# Patient Record
Sex: MALE | Race: WHITE | NOT HISPANIC OR LATINO | Employment: FULL TIME | URBAN - METROPOLITAN AREA
[De-identification: names, ages, dates, MRNs, and addresses within clinical notes are randomized per-mention and may not be internally consistent; named-entity substitution may affect disease eponyms.]

---

## 2017-01-10 ENCOUNTER — OFFICE VISIT (OUTPATIENT)
Dept: URGENT CARE | Facility: URGENT CARE | Age: 62
End: 2017-01-10
Payer: COMMERCIAL

## 2017-01-10 VITALS
DIASTOLIC BLOOD PRESSURE: 100 MMHG | HEART RATE: 88 BPM | SYSTOLIC BLOOD PRESSURE: 152 MMHG | TEMPERATURE: 98.1 F | OXYGEN SATURATION: 98 % | WEIGHT: 304 LBS

## 2017-01-10 DIAGNOSIS — H00.011 HORDEOLUM EXTERNUM OF RIGHT UPPER EYELID: Primary | ICD-10-CM

## 2017-01-10 DIAGNOSIS — H69.91 DYSFUNCTION OF EUSTACHIAN TUBE, RIGHT: ICD-10-CM

## 2017-01-10 PROCEDURE — 99213 OFFICE O/P EST LOW 20 MIN: CPT | Performed by: PHYSICIAN ASSISTANT

## 2017-01-10 RX ORDER — CEPHALEXIN 500 MG/1
500 CAPSULE ORAL 3 TIMES DAILY
Qty: 30 CAPSULE | Refills: 0 | Status: SHIPPED | OUTPATIENT
Start: 2017-01-10 | End: 2017-10-02

## 2017-01-10 RX ORDER — TOBRAMYCIN 3 MG/ML
1 SOLUTION/ DROPS OPHTHALMIC EVERY 4 HOURS
Qty: 1 BOTTLE | Refills: 0 | Status: SHIPPED | OUTPATIENT
Start: 2017-01-10 | End: 2017-01-17

## 2017-01-10 RX ORDER — FLUTICASONE PROPIONATE 50 MCG
1-2 SPRAY, SUSPENSION (ML) NASAL DAILY
Qty: 16 G | Refills: 0 | Status: SHIPPED | OUTPATIENT
Start: 2017-01-10 | End: 2019-06-20

## 2017-01-10 NOTE — PROGRESS NOTES
SUBJECTIVE:  Chief Complaint:   Chief Complaint   Patient presents with     Eye Problem     rt upper eyelid swelling,states some feeling of FB sensation     History of Present Illness:  Ras Esparza is a 61 year old male who presents complaining of mild and moderate right eye eyelid redness, swelling and lump noted for 2 day(s).   Onset/timing: gradual.    Associated Signs and Symptoms: right upper eyelid swelling and tenderness  Treatment measures tried include: none  Contact wearer : No    PMH:  Obesity    ALLERGIES   No Known Allergies     Social History     Social History     Marital Status:      Spouse Name: N/A     Number of Children: N/A     Years of Education: N/A     Occupational History     Not on file.     Social History Main Topics     Smoking status: Current Every Day Smoker -- 0.50 packs/day     Types: Cigarettes     Smokeless tobacco: Never Used     Alcohol Use: Not on file     Drug Use: Not on file     Sexual Activity: Not on file     Other Topics Concern     Not on file     Social History Narrative     No narrative on file         ROS:  CONSTITUTIONAL:NEGATIVE for fever, chills, change in weight  INTEGUMENTARY/SKIN: POSITIVE for right upper eyelid tenderness erythema, swelling  EYES: NEGATIVE for vision changes or irritation  ENT/MOUTH: Positive for right ear pressure  RESP:NEGATIVE for significant cough or SOB  CV: NEGATIVE for chest pain, palpitations or peripheral edema  NEURO: NEGATIVE for weakness, dizziness or paresthesias    OBJECTIVE:  /100 mmHg  Pulse 88  Temp(Src) 98.1  F (36.7  C) (Oral)  Wt 304 lb (137.893 kg)  SpO2 98%  General: no acute distress  Skin: Positive for right upper eyelid tenderness, erythema, localized swelling  Eye exam: right eye normal lid, conjunctiva, cornea, pupil and fundus, left eye normal lid, conjunctiva, cornea, pupil and fundus.  Ears: normal canals, TMs bilaterally, normal TM mobility  Nose: Positive for nasal congestion  Neck: supple,  non-tender, free range of motion, no adenopathy  Heart: NORMAL - regular rate and rhythm without murmur.  Lungs: normal and clear to auscultation  Neuro: PERRLA, EOMI    ASSESSMENT/PLAN:      ICD-10-CM    1. Hordeolum externum of right upper eyelid H00.011 tobramycin (TOBREX) 0.3 % ophthalmic solution     cephALEXin (KEFLEX) 500 MG capsule   2. Dysfunction of eustachian tube, right H69.81 fluticasone (FLONASE) 50 MCG/ACT spray       Warm moist compresses  OTC motrin  Follow up as needed

## 2017-01-10 NOTE — NURSING NOTE
Chief Complaint   Patient presents with     Eye Problem     rt upper eyelid swelling,states some feeling of FB sensation       Initial /100 mmHg  Pulse 88  Temp(Src) 98.1  F (36.7  C) (Oral)  Wt 304 lb (137.893 kg)  SpO2 98% There is no height on file to calculate BMI.  BP completed using cuff size: large

## 2017-10-02 ENCOUNTER — RADIANT APPOINTMENT (OUTPATIENT)
Dept: GENERAL RADIOLOGY | Facility: CLINIC | Age: 62
End: 2017-10-02
Attending: PHYSICIAN ASSISTANT
Payer: OTHER MISCELLANEOUS

## 2017-10-02 ENCOUNTER — OFFICE VISIT (OUTPATIENT)
Dept: URGENT CARE | Facility: URGENT CARE | Age: 62
End: 2017-10-02
Payer: OTHER MISCELLANEOUS

## 2017-10-02 VITALS
SYSTOLIC BLOOD PRESSURE: 150 MMHG | DIASTOLIC BLOOD PRESSURE: 100 MMHG | HEART RATE: 82 BPM | TEMPERATURE: 98.2 F | OXYGEN SATURATION: 97 % | WEIGHT: 300.9 LBS

## 2017-10-02 DIAGNOSIS — S61.219A LACERATION OF FINGER OF RIGHT HAND WITHOUT FOREIGN BODY, NAIL DAMAGE STATUS UNSPECIFIED, UNSPECIFIED FINGER, INITIAL ENCOUNTER: Primary | ICD-10-CM

## 2017-10-02 DIAGNOSIS — S99.911A ANKLE INJURY, RIGHT, INITIAL ENCOUNTER: ICD-10-CM

## 2017-10-02 DIAGNOSIS — S82.61XA CLOSED DISPLACED FRACTURE OF LATERAL MALLEOLUS OF RIGHT FIBULA, INITIAL ENCOUNTER: ICD-10-CM

## 2017-10-02 PROCEDURE — 73610 X-RAY EXAM OF ANKLE: CPT | Mod: RT

## 2017-10-02 PROCEDURE — 99214 OFFICE O/P EST MOD 30 MIN: CPT | Mod: 25 | Performed by: PHYSICIAN ASSISTANT

## 2017-10-02 PROCEDURE — 12002 RPR S/N/AX/GEN/TRNK2.6-7.5CM: CPT | Performed by: PHYSICIAN ASSISTANT

## 2017-10-02 PROCEDURE — 90471 IMMUNIZATION ADMIN: CPT | Performed by: PHYSICIAN ASSISTANT

## 2017-10-02 PROCEDURE — 90714 TD VACC NO PRESV 7 YRS+ IM: CPT | Performed by: PHYSICIAN ASSISTANT

## 2017-10-02 RX ORDER — IBUPROFEN 200 MG
200 TABLET ORAL EVERY 4 HOURS PRN
COMMUNITY
End: 2019-06-20

## 2017-10-02 RX ORDER — HYDROCODONE BITARTRATE AND ACETAMINOPHEN 5; 325 MG/1; MG/1
1 TABLET ORAL EVERY 6 HOURS PRN
Qty: 15 TABLET | Refills: 0 | Status: SHIPPED | OUTPATIENT
Start: 2017-10-02 | End: 2019-06-20

## 2017-10-02 NOTE — NURSING NOTE
Screening Questionnaire for Adult Immunization    Are you sick today?   No   Do you have allergies to medications, food, a vaccine component or latex?   No   Have you ever had a serious reaction after receiving a vaccination?   No   Do you have a long-term health problem with heart disease, lung disease, asthma, kidney disease, metabolic disease (e.g. diabetes), anemia, or other blood disorder?   No   Do you have cancer, leukemia, HIV/AIDS, or any other immune system problem?   No   In the past 3 months, have you taken medications that affect  your immune system, such as prednisone, other steroids, or anticancer drugs; drugs for the treatment of rheumatoid arthritis, Crohn s disease, or psoriasis; or have you had radiation treatments?   No   Have you had a seizure, or a brain or other nervous system problem?   No   During the past year, have you received a transfusion of blood or blood     products, or been given immune (gamma) globulin or antiviral drug?   No   For women: Are you pregnant or is there a chance you could become        pregnant during the next month?   No   Have you received any vaccinations in the past 4 weeks?   No     Immunization questionnaire answers were all negative.        Per orders of Antione Egan, injection of Td given by Mary Dalton. Patient instructed to remain in clinic for 15 minutes afterwards, and to report any adverse reaction to me immediately.       Screening performed by Mary Dalton on 10/2/2017 at 12:07 PM.

## 2017-10-02 NOTE — NURSING NOTE
Chief Complaint   Patient presents with     Fall     Pt reports that he tripped over some carpet this morning while at work. At the time, pt had a coffee mug in his right hand. The mug shattered when he hit the ground causing a few lacerations in his fingeres on the right hand. Pt also has a painful and swollen right ankle from the fall.     Urgent Care       Initial BP (!) 150/100  Pulse 82  Temp 98.2  F (36.8  C)  Wt (!) 300 lb 14.4 oz (136.5 kg)  SpO2 97% There is no height or weight on file to calculate BMI.  Medication Reconciliation: complete     Pt does not remember the last time he had a tetanus shot.

## 2017-10-02 NOTE — MR AVS SNAPSHOT
"              After Visit Summary   10/2/2017    Ras Esparza    MRN: [id number]           Patient Information     Date Of Birth          1955        Visit Information        Provider Department      10/2/2017 11:05 AM Antione Egan PA-C [facility]        Today's Diagnoses     Laceration of finger of right hand without foreign body, nail damage status unspecified, unspecified finger, initial encounter    -  1    Ankle injury, right, initial encounter        Closed displaced fracture of lateral malleolus of right fibula, initial encounter           Follow-ups after your visit        Who to contact     If you have questions or need follow up information about today's clinic visit or your schedule please contact [facility] directly at [phone].  Normal or non-critical lab and imaging results will be communicated to you by Mingleboxhart, letter or phone within 4 business days after the clinic has received the results. If you do not hear from us within 7 days, please contact the clinic through MyChart or phone. If you have a critical or abnormal lab result, we will notify you by phone as soon as possible.  Submit refill requests through Veebox or call your pharmacy and they will forward the refill request to us. Please allow 3 business days for your refill to be completed.          Additional Information About Your Visit        Mingleboxhart Information     Veebox lets you send messages to your doctor, view your test results, renew your prescriptions, schedule appointments and more. To sign up, go to [email or web address] . Click on \"Log in\" on the left side of the screen, which will take you to the Welcome page. Then click on \"Sign up Now\" on the right side of the page.     You will be asked to enter the access code listed below, as well as some personal information. Please follow the directions to create your username and password.     Your access code " is: [email or web address]  Expires: 2017 12:25 PM     Your access code will  in 90 days. If you need help or a new code, please call your [place] clinic or [phone].        Care EveryWhere ID     This is your Care EveryWhere ID. This could be used by other organizations to access your [place] medical records  [id number]        Your Vitals Were     Pulse Temperature Pulse Oximetry             82 98.2  F (36.8  C) 97%          Blood Pressure from Last 3 Encounters:   10/02/17 (!) 150/100   01/10/17 (!) 152/100    Weight from Last 3 Encounters:   10/02/17 (!) 300 lb 14.4 oz (136.5 kg)   01/10/17 (!) 304 lb (137.9 kg)              We Performed the Following     REPAIR SUPERFICIAL, WOUND BODY 2.6-7.5 CM     TD PRESERV FREE >=7 YRS ADS IM          Today's Medication Changes          These changes are accurate as of: 10/2/17 12:25 PM.  If you have any questions, ask your nurse or doctor.               Start taking these medicines.        Dose/Directions    HYDROcodone-acetaminophen 5-325 MG per tablet   Commonly known as:  NORCO   Used for:  Closed displaced fracture of lateral malleolus of right fibula, initial encounter   Started by:  Antione Egan PA-C        Dose:  1 tablet   Take 1 tablet by mouth every 6 hours as needed   Quantity:  15 tablet   Refills:  0       * order for DME   Used for:  Closed displaced fracture of lateral malleolus of right fibula, initial encounter   Started by:  Antione Egan PA-C        Right tall cam walker   Quantity:  1 Device   Refills:  0       * order for DME   Used for:  Closed displaced fracture of lateral malleolus of right fibula, initial encounter   Started by:  Antione Egan PA-C        Crutches 1 pair   Quantity:  1 Device   Refills:  0       * Notice:  This list has 2 medication(s) that are the same as other medications prescribed for you. Read the directions carefully, and ask your doctor or other care provider to review them with you.         Where to get  your medicines      Some of these will need a paper prescription and others can be bought over the counter.  Ask your nurse if you have questions.     Bring a paper prescription for each of these medications     HYDROcodone-acetaminophen 5-325 MG per tablet    order for DME    order for DME                Primary Care Provider    None Specified       No primary provider on file.        Equal Access to Services     EMILY GALDAMEZ : Hadii aad ku hadrossvernon Sochaparritaali, waaxda luqadaha, qaybta kaalmada adeegyada, laura louietexsheng kaminski. So [facility] [phone].    ATENCIÓN: Si habla español, tiene a curry disposición servicios gratuitos de asistencia lingüística. Amaliaame al [phone].    We comply with applicable federal civil rights laws and [place] laws. We do not discriminate on the basis of race, color, national origin, age, disability, sex, sexual orientation, or gender identity.            Thank you!     Thank you for choosing [facility]  for your care. Our goal is always to provide you with excellent care. Hearing back from our patients is one way we can continue to improve our services. Please take a few minutes to complete the written survey that you may receive in the mail after your visit with us. Thank you!             Your Updated Medication List - Protect others around you: Learn how to safely use, store and throw away your medicines at www.disposemymeds.org.          This list is accurate as of: 10/2/17 12:25 PM.  Always use your most recent med list.                   Brand Name Dispense Instructions for use Diagnosis    fluticasone 50 MCG/ACT spray    FLONASE    16 g    Spray 1-2 sprays into both nostrils daily    Dysfunction of Eustachian tube, right       HYDROcodone-acetaminophen 5-325 MG per tablet    NORCO    15 tablet    Take 1 tablet by mouth every 6 hours as needed    Closed displaced fracture of lateral malleolus of right fibula, initial encounter        ibuprofen 200 MG tablet    ADVIL/MOTRIN     Take 200 mg by mouth every 4 hours as needed for mild pain        * order for DME     1 Device    Right tall cam walker    Closed displaced fracture of lateral malleolus of right fibula, initial encounter       * order for DME     1 Device    Crutches 1 pair    Closed displaced fracture of lateral malleolus of right fibula, initial encounter       * Notice:  This list has 2 medication(s) that are the same as other medications prescribed for you. Read the directions carefully, and ask your doctor or other care provider to review them with you.

## 2017-10-02 NOTE — PROGRESS NOTES
SUBJECTIVE:     Chief Complaint   Patient presents with     Fall     Pt reports that he tripped over some carpet this morning while at work. At the time, pt had a coffee mug in his right hand. The mug shattered when he hit the ground causing a few lacerations in his fingeres on the right hand. Pt also has a painful and swollen right ankle from the fall.     Urgent Care     Ras Esparza is a 62 year old male who presents to the clinic with a laceration on the right fingers sustained 1 hours(s) ago.  This is a non-work related injury.    Mechanism of injury: coffee cup shattered when he fell.  He injured his right ankle in the process    Associated symptoms: finger lacerations, right ankle tenderness, right lateral ankle  Last tetanus booster within 10 years: no    PMH  Overweight    ALLERGIES  No Known Allergies     Social History     Social History     Marital status:      Spouse name: N/A     Number of children: N/A     Years of education: N/A     Occupational History     Not on file.     Social History Main Topics     Smoking status: Current Every Day Smoker     Packs/day: 0.50     Types: Cigarettes     Smokeless tobacco: Never Used      Comment: 12 cigarettes per day     Alcohol use Not on file     Drug use: Not on file     Sexual activity: Not on file     Other Topics Concern     Not on file     Social History Narrative     ROS:  GEN: No acute distress  SKIN: Positive for lacerations of fingers right hand  VASC: Normal perfusion  MS: Positive for right lateral ankle tenderness, localized swelling  NEURO: Normal sensation      EXAM:   GEN: No acute stress  VITALS: BP (!) 150/100  Pulse 82  Temp 98.2  F (36.8  C)  Wt (!) 300 lb 14.4 oz (136.5 kg)  SpO2 97%  SKIN:Size of laceration: 4 centimeters  Characteristics of the laceration: bleeding- moderate  Tendon function intact: yes  Sensation to light touch intact: yes  Pulses intact: yes  Picture included in patient's chart: no  MS: Positive for right  lateral ankle tenderness, localized swelling  NEURO: Negative for numbness  VASC: warm fingers and perfusion    Ankle xray Positive for right lateral ankle fracture, distal fibula, read by Antione Egan PA-C [facility] at time of visit.      Assessment:     Laceration of finger of right hand without foreign body, nail damage status unspecified, unspecified finger, initial encounter  Ankle injury, right, initial encounter    PLAN:  PROCEDURE NOTE::  Wound was locally injected with 3 cc's of Lidocaine 1% plain  Wound cleaned with HIBICLENS  Wound cleaned with betadine/saline solution  Lacerations were closed using  5-0 sutures interrupted sutures  After care instructions:  Keep wound clean and dry for the next 24-48 hours  Sutures out in 10 days  Apply anti-bacterial ointment for 1 week  Discussed the probability of scarring  Active range of motion exercises encouraged      ICD-10-CM    1. Laceration of finger of right hand without foreign body, nail damage status unspecified, unspecified finger, initial encounter S61.219A REPAIR SUPERFICIAL, WOUND BODY 2.6-7.5 CM     TD PRESERV FREE >=7 YRS ADS IM   2. Ankle injury, right, initial encounter S99.911A XR Ankle Right G/E 3 Views   3. Closed displaced fracture of lateral malleolus of right fibula, initial encounter S82.61XA order for DME     order for DME     HYDROcodone-acetaminophen (NORCO) 5-325 MG per tablet       Wear cam walker and use crutches  Follow up with Orthopedics this week for fracture

## 2017-10-12 ENCOUNTER — RADIANT APPOINTMENT (OUTPATIENT)
Dept: GENERAL RADIOLOGY | Facility: CLINIC | Age: 62
End: 2017-10-12
Attending: ORTHOPAEDIC SURGERY
Payer: OTHER MISCELLANEOUS

## 2017-10-12 DIAGNOSIS — R52 PAIN: ICD-10-CM

## 2017-10-12 PROCEDURE — 73610 X-RAY EXAM OF ANKLE: CPT | Mod: TC

## 2017-10-24 ENCOUNTER — RADIANT APPOINTMENT (OUTPATIENT)
Dept: GENERAL RADIOLOGY | Facility: CLINIC | Age: 62
End: 2017-10-24
Attending: ORTHOPAEDIC SURGERY
Payer: COMMERCIAL

## 2017-10-24 DIAGNOSIS — R52 PAIN: ICD-10-CM

## 2017-10-24 PROCEDURE — 73610 X-RAY EXAM OF ANKLE: CPT | Mod: TC

## 2017-11-16 ENCOUNTER — RADIANT APPOINTMENT (OUTPATIENT)
Dept: GENERAL RADIOLOGY | Facility: CLINIC | Age: 62
End: 2017-11-16
Attending: ORTHOPAEDIC SURGERY
Payer: COMMERCIAL

## 2017-11-16 DIAGNOSIS — R52 PAIN: ICD-10-CM

## 2017-11-16 PROCEDURE — 73610 X-RAY EXAM OF ANKLE: CPT | Mod: TC

## 2019-06-20 ENCOUNTER — OFFICE VISIT (OUTPATIENT)
Dept: INTERNAL MEDICINE | Facility: CLINIC | Age: 64
End: 2019-06-20
Payer: COMMERCIAL

## 2019-06-20 VITALS
HEART RATE: 94 BPM | RESPIRATION RATE: 16 BRPM | DIASTOLIC BLOOD PRESSURE: 80 MMHG | OXYGEN SATURATION: 99 % | TEMPERATURE: 97.4 F | BODY MASS INDEX: 34.27 KG/M2 | HEIGHT: 72 IN | SYSTOLIC BLOOD PRESSURE: 130 MMHG | WEIGHT: 253 LBS

## 2019-06-20 DIAGNOSIS — Z12.5 SCREENING FOR PROSTATE CANCER: ICD-10-CM

## 2019-06-20 DIAGNOSIS — R19.05 PERIUMBILICAL MASS: ICD-10-CM

## 2019-06-20 DIAGNOSIS — Z11.59 NEED FOR HEPATITIS C SCREENING TEST: ICD-10-CM

## 2019-06-20 DIAGNOSIS — F32.0 MILD MAJOR DEPRESSION (H): ICD-10-CM

## 2019-06-20 DIAGNOSIS — R06.83 SNORING: ICD-10-CM

## 2019-06-20 DIAGNOSIS — Z00.00 ROUTINE GENERAL MEDICAL EXAMINATION AT A HEALTH CARE FACILITY: Primary | ICD-10-CM

## 2019-06-20 DIAGNOSIS — Z13.220 SCREENING FOR HYPERLIPIDEMIA: ICD-10-CM

## 2019-06-20 DIAGNOSIS — R60.9 EDEMA, UNSPECIFIED TYPE: ICD-10-CM

## 2019-06-20 DIAGNOSIS — Z12.11 SCREEN FOR COLON CANCER: ICD-10-CM

## 2019-06-20 LAB
ERYTHROCYTE [DISTWIDTH] IN BLOOD BY AUTOMATED COUNT: 15.2 % (ref 10–15)
HCT VFR BLD AUTO: 27.9 % (ref 40–53)
HGB BLD-MCNC: 8.7 G/DL (ref 13.3–17.7)
MCH RBC QN AUTO: 30 PG (ref 26.5–33)
MCHC RBC AUTO-ENTMCNC: 31.2 G/DL (ref 31.5–36.5)
MCV RBC AUTO: 96 FL (ref 78–100)
NT-PROBNP SERPL-MCNC: ABNORMAL PG/ML (ref 0–125)
PLATELET # BLD AUTO: 227 10E9/L (ref 150–450)
RBC # BLD AUTO: 2.9 10E12/L (ref 4.4–5.9)
WBC # BLD AUTO: 8 10E9/L (ref 4–11)

## 2019-06-20 PROCEDURE — G0103 PSA SCREENING: HCPCS | Performed by: INTERNAL MEDICINE

## 2019-06-20 PROCEDURE — 80053 COMPREHEN METABOLIC PANEL: CPT | Performed by: INTERNAL MEDICINE

## 2019-06-20 PROCEDURE — 84443 ASSAY THYROID STIM HORMONE: CPT | Performed by: INTERNAL MEDICINE

## 2019-06-20 PROCEDURE — 86803 HEPATITIS C AB TEST: CPT | Performed by: INTERNAL MEDICINE

## 2019-06-20 PROCEDURE — 99396 PREV VISIT EST AGE 40-64: CPT | Performed by: INTERNAL MEDICINE

## 2019-06-20 PROCEDURE — 36415 COLL VENOUS BLD VENIPUNCTURE: CPT | Performed by: INTERNAL MEDICINE

## 2019-06-20 PROCEDURE — 85027 COMPLETE CBC AUTOMATED: CPT | Performed by: INTERNAL MEDICINE

## 2019-06-20 PROCEDURE — 83880 ASSAY OF NATRIURETIC PEPTIDE: CPT | Performed by: INTERNAL MEDICINE

## 2019-06-20 ASSESSMENT — MIFFLIN-ST. JEOR: SCORE: 1977.66

## 2019-06-20 NOTE — PROGRESS NOTES
SUBJECTIVE:   CC: Ras Esparza is an 64 year old male who presents for preventive health visit.     Healthy Habits:  Answers for HPI/ROS submitted by the patient on 6/20/2019   Annual Exam:  Frequency of exercise:: 1 day/week  Getting at least 3 servings of Calcium per day:: Yes  Diet:: Regular (no restrictions), Breakfast skipped  Taking medications regularly:: Yes  Bi-annual eye exam:: Yes  Dental care twice a year:: NO  Sleep apnea or symptoms of sleep apnea:: Daytime drowsiness. Has some snoring  Additional concerns today:: Yes  Duration of exercise:: Less than 15 minutes      Today's PHQ-2 Score:   PHQ-2 ( 1999 Pf[employer]) 6/20/2019   Q1: Little interest or pleasure in doing things 1   Q2: Feeling down, depressed or hopeless 1   PHQ-2 Score 2   Q1: Little interest or pleasure in doing things Several days   Q2: Feeling down, depressed or hopeless Several days   PHQ-2 Score 2       Abuse: Current or Past(Physical, Sexual or Emotional)- No  Do you feel safe in your environment? Yes    Social History     Tobacco Use     Smoking status: Current Every Day Smoker     Packs/day: 0.50     Types: Cigarettes     Smokeless tobacco: Never Used     Tobacco comment: 12 cigarettes per day   Substance Use Topics     Alcohol use: Not on file     If you drink alcohol do you typically have >3 drinks per day or >7 drinks per week? No                      Last PSA: No results found for: PSA    Reviewed orders with patient. Reviewed health maintenance and updated orders accordingly - Yes  Labs reviewed in EPIC    Reviewed and updated as needed this visit by clinical staff  Tobacco  Allergies  Meds         Reviewed and updated as needed this visit by Provider            ROS:  CONSTITUTIONAL: NEGATIVE for fever, chills. Weight down 50 pounds in 2 years with diet/exercise  INTEGUMENTARY/SKIN: NEGATIVE for worrisome rashes, moles or lesions  EYES: NEGATIVE for vision changes or irritation.  Has glasses for driving. Eye exam 1 year  "ago  ENT: NEGATIVE for ear, mouth and throat problems. Occ nasal congestion  RESP: NEGATIVE for significant cough. Quit smoking 1 month ago. 1/2 ppd prior. Able to mow lawn for 10-15 min and then has to stop for a few minutes to rest  CV: NEGATIVE for chest pain, palpitations. Has BLE peripheral edema for a couple weeks  GI: NEGATIVE for nausea, abdominal pain, heartburn, or change in bowel habits   male: negative for dysuria, hematuria  erectile dysfunction, urethral discharge. Stream slower. Nocturia x2. Coffee in AM 2 cups  MUSCULOSKELETAL: NEGATIVE for significant arthralgias or myalgia  NEURO: NEGATIVE for weakness, dizziness or paresthesias.   PSYCHIATRIC:  POSITIVE for depression recently. PHQ=     OBJECTIVE:   /80   Pulse 94   Temp 97.4  F (36.3  C) (Oral)   Resp 16   Ht 1.816 m (5' 11.5\")   Wt 114.8 kg (253 lb)   SpO2 99%   BMI 34.79 kg/m    EXAM:  General appearance -  alert, no distress  Skin - No  Lesions. Discoloration of skin bilateral low back from excess heating pad use  Head - normocephalic, atraumatic  Eyes - RHIANNA, EOMI, fundi exam with nondilated pupils negative.  Ears - External ears normal. Canals clear. TM's normal.  Nose/Sinuses - Nares normal. Septum midline. Mucosa normal. No drainage or sinus tenderness.  Oropharynx - No erythema, no adenopathy, no exudates.  Neck - Supple without adenopathy or thyromegaly. No bruits.  Lungs - Clear to auscultation without wheezes/rhonchi.  Heart - Regular rate and rhythm without murmurs, clicks, or gallops.  Nodes - No supraclavicular, axillary, or inguinal adenopathy palpable.  Abdomen - Abdomen obese,  soft, non-tender. BS normal. No  hepatosplenomegaly palpable. No bruits. Mass palpable  from symphysis pubis to a little above the umbilicus  Extremities -No cyanosis, clubbing. 3 plus BLE edema.    Musculoskeletal - Spine ROM normal. Muscular strength intact.   Peripheral pulses - radial=4/4, femoral=4/4, posterior tibial=4/4, dorsalis " pedis=4/4,  Neuro - Gait normal. Reflexes normal and symmetric. Sensation grossly WNL.  Genital - Normal-appearing male external genitalia. No scrotal masses or inguinal hernia palpable.   Rectal - Guaic negative stool. Normal tone. Prostate 3 plus in size with firmness /nodule left side  to palpation. No rectal masses  palpable       ASSESSMENT/PLAN:   1. Routine general medical examination at a health care facility   Lab as ordered. See below for HCM discussion. Will discuss Shingrix further once  Other issues addressed. Counseled to stop using heating pad  - Comprehensive metabolic panel  - TSH with free T4 reflex  - CBC with platelets    2. Edema, unspecified type  Labs to assess cause. Denies orthopnea. If no clear cause, will get ECHO heart and start diuretic therapy  - Comprehensive metabolic panel  - TSH with free T4 reflex  - N terminal pro BNP outpatient    3. Periumbilical mass  ? Enlarged bladder  Vs solid mass. Will need imaging. See plan discussion below.     4. Mild major depression (H)   Mild. Await labs and will then address. No suicidal ideation    5. Snoring   ? AVA. If labs OK, will refer to Sleep clinic    6. Screen for colon cancer  FIT test for now. Fture colonoscopy when willing  - Fecal colorectal cancer screen FIT - Future (S+30); Future    7. Screening for hyperlipidemia  Future lab when fasting  - Lipid panel reflex to direct LDL Fasting; Future    8. Screening for prostate cancer   abnormal prostate exam. Lab as ordered. Probable future urology referral. Will await lab result  - Prostate spec antigen screen    9. Need for hepatitis C screening test  Pt meets criteria for hepatitis C screening based on birth year 1943-65. Discussed pro/cons of Hep C screening/treatment and recs of USPTF. Pt agreeable to screening  - Hepatitis C Screen Reflex to HCV RNA Quant and Genotype      COUNSELING:  Reviewed preventive health counseling, as reflected in patient instructions    Estimated body mass  "index is 34.79 kg/m  as calculated from the following:    Height as of this encounter: 1.816 m (5' 11.5\").    Weight as of this encounter: 114.8 kg (253 lb).    Weight management plan: Discussed healthy diet and exercise guidelines     reports that he has been smoking cigarettes.  He has been smoking about 0.50 packs per day. He has never used smokeless tobacco.      Counseling Resources:  ATP IV Guidelines  Pooled Cohorts Equation Calculator  FRAX Risk Assessment  ICSI Preventive Guidelines  Dietary Guidelines for Americans, 2010  USDA's MyPlate  ASA Prophylaxis  Lung CA Screening      PLAN:   Labs as ordered including FIT stool test.   Future colonoscopy screening for colon cancer once other issues addressed  [facility]. They will call to schedule. Need to assess kidney function first today to check kidney function and  confirm  safety to use contrast. Will call you tomorrow with lab results and then place CT order if kidney function OK vs abdominal ultrasound  Fasting lab test in future for cholesterol once above issues clarified  Management of leg swelling will be based on lab results  Prostate management will be based on lab results  Possible  referral  In future to sleep clinic for fatigue if other workup negative  Mental health management will depend on above results    Krzysztof Thakur MD  [facility]       "

## 2019-06-20 NOTE — PATIENT INSTRUCTIONS
Labs as ordered including FIT stool test.   Future colonoscopy screening for colon cancer once other issues addressed  Probable CT abdomen and pelvis at [facility]. They will call to schedule. Need to assess kidney function first today to check kidney function and  confirm  safety to use contrast. Will call you tomorrow with lab results and then place CT order if kidney function OK vs abdominal ultrasound  Fasting lab test in future for cholesterol once above issues clarified  Management of leg swelling will be based on lab results  Prostate management will be based on lab results  Possible  referral  In future to sleep clinic for fatigue if other workup negative  Mental health management will depend on above results

## 2019-06-21 ENCOUNTER — APPOINTMENT (OUTPATIENT)
Dept: GENERAL RADIOLOGY | Facility: CLINIC | Age: 64
DRG: 699 | End: 2019-06-21
Attending: INTERNAL MEDICINE
Payer: COMMERCIAL

## 2019-06-21 ENCOUNTER — TELEPHONE (OUTPATIENT)
Dept: INTERNAL MEDICINE | Facility: CLINIC | Age: 64
End: 2019-06-21

## 2019-06-21 ENCOUNTER — APPOINTMENT (OUTPATIENT)
Dept: CT IMAGING | Facility: CLINIC | Age: 64
DRG: 699 | End: 2019-06-21
Attending: EMERGENCY MEDICINE
Payer: COMMERCIAL

## 2019-06-21 ENCOUNTER — APPOINTMENT (OUTPATIENT)
Dept: GENERAL RADIOLOGY | Facility: CLINIC | Age: 64
DRG: 699 | End: 2019-06-21
Attending: EMERGENCY MEDICINE
Payer: COMMERCIAL

## 2019-06-21 ENCOUNTER — HOSPITAL ENCOUNTER (INPATIENT)
Facility: CLINIC | Age: 64
LOS: 4 days | Discharge: HOME | DRG: 699 | End: 2019-06-25
Attending: EMERGENCY MEDICINE | Admitting: INTERNAL MEDICINE
Payer: COMMERCIAL

## 2019-06-21 ENCOUNTER — APPOINTMENT (OUTPATIENT)
Dept: ULTRASOUND IMAGING | Facility: CLINIC | Age: 64
DRG: 699 | End: 2019-06-21
Attending: INTERNAL MEDICINE
Payer: COMMERCIAL

## 2019-06-21 DIAGNOSIS — N13.9 LOWER URINARY TRACT OBSTRUCTION: ICD-10-CM

## 2019-06-21 DIAGNOSIS — R33.9 URINARY RETENTION: ICD-10-CM

## 2019-06-21 DIAGNOSIS — N13.4: ICD-10-CM

## 2019-06-21 DIAGNOSIS — N19 RENAL FAILURE, UNSPECIFIED CHRONICITY: ICD-10-CM

## 2019-06-21 DIAGNOSIS — I42.9 CARDIOMYOPATHY, UNSPECIFIED TYPE (H): Primary | ICD-10-CM

## 2019-06-21 DIAGNOSIS — N17.9 ACUTE KIDNEY INJURY (H): ICD-10-CM

## 2019-06-21 DIAGNOSIS — R03.0 ELEVATED BLOOD PRESSURE READING WITHOUT DIAGNOSIS OF HYPERTENSION: ICD-10-CM

## 2019-06-21 DIAGNOSIS — D64.9 NORMOCYTIC ANEMIA: ICD-10-CM

## 2019-06-21 DIAGNOSIS — N13.39 OTHER HYDRONEPHROSIS: ICD-10-CM

## 2019-06-21 LAB
ALBUMIN SERPL-MCNC: 3.3 G/DL (ref 3.4–5)
ALBUMIN SERPL-MCNC: 3.5 G/DL (ref 3.4–5)
ALBUMIN UR-MCNC: 10 MG/DL
ALP SERPL-CCNC: 79 U/L (ref 40–150)
ALP SERPL-CCNC: 80 U/L (ref 40–150)
ALT SERPL W P-5'-P-CCNC: 38 U/L (ref 0–70)
ALT SERPL W P-5'-P-CCNC: 39 U/L (ref 0–70)
ANION GAP SERPL CALCULATED.3IONS-SCNC: 11 MMOL/L (ref 3–14)
ANION GAP SERPL CALCULATED.3IONS-SCNC: 13 MMOL/L (ref 3–14)
ANION GAP SERPL CALCULATED.3IONS-SCNC: 14 MMOL/L (ref 3–14)
APPEARANCE UR: CLEAR
AST SERPL W P-5'-P-CCNC: 15 U/L (ref 0–45)
AST SERPL W P-5'-P-CCNC: 18 U/L (ref 0–45)
BASOPHILS # BLD AUTO: 0 10E9/L (ref 0–0.2)
BASOPHILS NFR BLD AUTO: 0.4 %
BILIRUB SERPL-MCNC: 0.6 MG/DL (ref 0.2–1.3)
BILIRUB SERPL-MCNC: 0.7 MG/DL (ref 0.2–1.3)
BILIRUB UR QL STRIP: NEGATIVE
BUN SERPL-MCNC: 94 MG/DL (ref 7–30)
BUN SERPL-MCNC: 96 MG/DL (ref 7–30)
BUN SERPL-MCNC: 98 MG/DL (ref 7–30)
CALCIUM SERPL-MCNC: 8.3 MG/DL (ref 8.5–10.1)
CALCIUM SERPL-MCNC: 8.4 MG/DL (ref 8.5–10.1)
CALCIUM SERPL-MCNC: 8.7 MG/DL (ref 8.5–10.1)
CHLORIDE SERPL-SCNC: 110 MMOL/L (ref 94–109)
CHLORIDE SERPL-SCNC: 110 MMOL/L (ref 94–109)
CHLORIDE SERPL-SCNC: 112 MMOL/L (ref 94–109)
CO2 SERPL-SCNC: 18 MMOL/L (ref 20–32)
COLOR UR AUTO: ABNORMAL
CREAT SERPL-MCNC: 8.15 MG/DL (ref 0.66–1.25)
CREAT SERPL-MCNC: 8.94 MG/DL (ref 0.66–1.25)
CREAT SERPL-MCNC: 9.59 MG/DL (ref 0.66–1.25)
DIFFERENTIAL METHOD BLD: ABNORMAL
EOSINOPHIL # BLD AUTO: 0.2 10E9/L (ref 0–0.7)
EOSINOPHIL NFR BLD AUTO: 2.9 %
ERYTHROCYTE [DISTWIDTH] IN BLOOD BY AUTOMATED COUNT: 15.4 % (ref 10–15)
GFR SERPL CREATININE-BSD FRML MDRD: 5 ML/MIN/{1.73_M2}
GFR SERPL CREATININE-BSD FRML MDRD: 6 ML/MIN/{1.73_M2}
GFR SERPL CREATININE-BSD FRML MDRD: 6 ML/MIN/{1.73_M2}
GLUCOSE SERPL-MCNC: 100 MG/DL (ref 70–99)
GLUCOSE SERPL-MCNC: 128 MG/DL (ref 70–99)
GLUCOSE SERPL-MCNC: 95 MG/DL (ref 70–99)
GLUCOSE UR STRIP-MCNC: NEGATIVE MG/DL
HCT VFR BLD AUTO: 27.7 % (ref 40–53)
HCV AB SERPL QL IA: NONREACTIVE
HGB BLD-MCNC: 8.9 G/DL (ref 13.3–17.7)
HGB UR QL STRIP: NEGATIVE
IMM GRANULOCYTES # BLD: 0 10E9/L (ref 0–0.4)
IMM GRANULOCYTES NFR BLD: 0.1 %
INTERPRETATION ECG - MUSE: NORMAL
KETONES UR STRIP-MCNC: NEGATIVE MG/DL
LEUKOCYTE ESTERASE UR QL STRIP: ABNORMAL
LYMPHOCYTES # BLD AUTO: 1.1 10E9/L (ref 0.8–5.3)
LYMPHOCYTES NFR BLD AUTO: 16.4 %
MCH RBC QN AUTO: 29.9 PG (ref 26.5–33)
MCHC RBC AUTO-ENTMCNC: 32.1 G/DL (ref 31.5–36.5)
MCV RBC AUTO: 93 FL (ref 78–100)
MONOCYTES # BLD AUTO: 0.4 10E9/L (ref 0–1.3)
MONOCYTES NFR BLD AUTO: 5.8 %
MUCOUS THREADS #/AREA URNS LPF: PRESENT /LPF
NEUTROPHILS # BLD AUTO: 5.1 10E9/L (ref 1.6–8.3)
NEUTROPHILS NFR BLD AUTO: 74.4 %
NITRATE UR QL: NEGATIVE
NRBC # BLD AUTO: 0 10*3/UL
NRBC BLD AUTO-RTO: 0 /100
PH UR STRIP: 6.5 PH (ref 5–7)
PLATELET # BLD AUTO: 228 10E9/L (ref 150–450)
POTASSIUM SERPL-SCNC: 4.5 MMOL/L (ref 3.4–5.3)
POTASSIUM SERPL-SCNC: 4.7 MMOL/L (ref 3.4–5.3)
POTASSIUM SERPL-SCNC: 5 MMOL/L (ref 3.4–5.3)
PROT SERPL-MCNC: 7.1 G/DL (ref 6.8–8.8)
PROT SERPL-MCNC: 7.1 G/DL (ref 6.8–8.8)
PSA SERPL-ACNC: 15.1 UG/L (ref 0–4)
RBC # BLD AUTO: 2.98 10E12/L (ref 4.4–5.9)
RBC #/AREA URNS AUTO: 2 /HPF (ref 0–2)
SODIUM SERPL-SCNC: 141 MMOL/L (ref 133–144)
SODIUM SERPL-SCNC: 141 MMOL/L (ref 133–144)
SODIUM SERPL-SCNC: 142 MMOL/L (ref 133–144)
SOURCE: ABNORMAL
SP GR UR STRIP: 1.01 (ref 1–1.03)
TSH SERPL DL<=0.005 MIU/L-ACNC: 0.85 MU/L (ref 0.4–4)
UROBILINOGEN UR STRIP-MCNC: NORMAL MG/DL (ref 0–2)
WBC # BLD AUTO: 6.9 10E9/L (ref 4–11)
WBC #/AREA URNS AUTO: 12 /HPF (ref 0–5)

## 2019-06-21 PROCEDURE — 87086 URINE CULTURE/COLONY COUNT: CPT | Performed by: EMERGENCY MEDICINE

## 2019-06-21 PROCEDURE — 36415 COLL VENOUS BLD VENIPUNCTURE: CPT | Performed by: INTERNAL MEDICINE

## 2019-06-21 PROCEDURE — 81001 URINALYSIS AUTO W/SCOPE: CPT | Performed by: EMERGENCY MEDICINE

## 2019-06-21 PROCEDURE — 12000000 ZZH R&B MED SURG/OB

## 2019-06-21 PROCEDURE — 25800030 ZZH RX IP 258 OP 636: Performed by: INTERNAL MEDICINE

## 2019-06-21 PROCEDURE — 93970 EXTREMITY STUDY: CPT

## 2019-06-21 PROCEDURE — 72080 X-RAY EXAM THORACOLMB 2/> VW: CPT

## 2019-06-21 PROCEDURE — 25800029 ZZH RX IP 258 OP 250: Performed by: INTERNAL MEDICINE

## 2019-06-21 PROCEDURE — 25000125 ZZHC RX 250: Performed by: EMERGENCY MEDICINE

## 2019-06-21 PROCEDURE — 51702 INSERT TEMP BLADDER CATH: CPT

## 2019-06-21 PROCEDURE — 86706 HEP B SURFACE ANTIBODY: CPT | Performed by: INTERNAL MEDICINE

## 2019-06-21 PROCEDURE — 99285 EMERGENCY DEPT VISIT HI MDM: CPT | Mod: 25

## 2019-06-21 PROCEDURE — 80053 COMPREHEN METABOLIC PANEL: CPT | Performed by: EMERGENCY MEDICINE

## 2019-06-21 PROCEDURE — 85025 COMPLETE CBC W/AUTO DIFF WBC: CPT | Performed by: EMERGENCY MEDICINE

## 2019-06-21 PROCEDURE — 80048 BASIC METABOLIC PNL TOTAL CA: CPT | Performed by: INTERNAL MEDICINE

## 2019-06-21 PROCEDURE — 93005 ELECTROCARDIOGRAM TRACING: CPT

## 2019-06-21 PROCEDURE — 51798 US URINE CAPACITY MEASURE: CPT

## 2019-06-21 PROCEDURE — 74176 CT ABD & PELVIS W/O CONTRAST: CPT

## 2019-06-21 PROCEDURE — 71046 X-RAY EXAM CHEST 2 VIEWS: CPT

## 2019-06-21 PROCEDURE — 25000128 H RX IP 250 OP 636: Performed by: INTERNAL MEDICINE

## 2019-06-21 PROCEDURE — 99223 1ST HOSP IP/OBS HIGH 75: CPT | Mod: AI | Performed by: INTERNAL MEDICINE

## 2019-06-21 PROCEDURE — 87340 HEPATITIS B SURFACE AG IA: CPT | Performed by: INTERNAL MEDICINE

## 2019-06-21 RX ORDER — HYDRALAZINE HYDROCHLORIDE 20 MG/ML
10 INJECTION INTRAMUSCULAR; INTRAVENOUS EVERY 4 HOURS PRN
Status: DISCONTINUED | OUTPATIENT
Start: 2019-06-21 | End: 2019-06-21

## 2019-06-21 RX ORDER — HYDROCODONE BITARTRATE AND ACETAMINOPHEN 5; 325 MG/1; MG/1
1-2 TABLET ORAL EVERY 4 HOURS PRN
Status: DISCONTINUED | OUTPATIENT
Start: 2019-06-21 | End: 2019-06-25 | Stop reason: HOSPADM

## 2019-06-21 RX ORDER — ACETAMINOPHEN 325 MG/1
650 TABLET ORAL EVERY 4 HOURS PRN
Status: DISCONTINUED | OUTPATIENT
Start: 2019-06-21 | End: 2019-06-25 | Stop reason: HOSPADM

## 2019-06-21 RX ORDER — SODIUM CHLORIDE 450 MG/100ML
INJECTION, SOLUTION INTRAVENOUS CONTINUOUS
Status: DISCONTINUED | OUTPATIENT
Start: 2019-06-21 | End: 2019-06-24

## 2019-06-21 RX ORDER — SODIUM CHLORIDE 9 MG/ML
INJECTION, SOLUTION INTRAVENOUS CONTINUOUS
Status: DISCONTINUED | OUTPATIENT
Start: 2019-06-21 | End: 2019-06-21

## 2019-06-21 RX ORDER — NALOXONE HYDROCHLORIDE 0.4 MG/ML
.1-.4 INJECTION, SOLUTION INTRAMUSCULAR; INTRAVENOUS; SUBCUTANEOUS
Status: DISCONTINUED | OUTPATIENT
Start: 2019-06-21 | End: 2019-06-25 | Stop reason: HOSPADM

## 2019-06-21 RX ORDER — POLYETHYLENE GLYCOL 3350 17 G/17G
17 POWDER, FOR SOLUTION ORAL DAILY PRN
Status: DISCONTINUED | OUTPATIENT
Start: 2019-06-21 | End: 2019-06-25 | Stop reason: HOSPADM

## 2019-06-21 RX ORDER — LABETALOL 20 MG/4 ML (5 MG/ML) INTRAVENOUS SYRINGE
10
Status: DISCONTINUED | OUTPATIENT
Start: 2019-06-21 | End: 2019-06-25 | Stop reason: HOSPADM

## 2019-06-21 RX ORDER — LIDOCAINE HYDROCHLORIDE 20 MG/ML
10 JELLY TOPICAL ONCE
Status: COMPLETED | OUTPATIENT
Start: 2019-06-21 | End: 2019-06-21

## 2019-06-21 RX ORDER — HYDRALAZINE HYDROCHLORIDE 20 MG/ML
10 INJECTION INTRAMUSCULAR; INTRAVENOUS EVERY 4 HOURS PRN
Status: DISCONTINUED | OUTPATIENT
Start: 2019-06-21 | End: 2019-06-23

## 2019-06-21 RX ORDER — IBUPROFEN 200 MG
100-200 TABLET ORAL DAILY PRN
Status: ON HOLD | COMMUNITY
End: 2019-06-25

## 2019-06-21 RX ORDER — LIDOCAINE 40 MG/G
CREAM TOPICAL
Status: DISCONTINUED | OUTPATIENT
Start: 2019-06-21 | End: 2019-06-25 | Stop reason: HOSPADM

## 2019-06-21 RX ADMIN — LIDOCAINE HYDROCHLORIDE 6 ML: 20 JELLY TOPICAL at 11:22

## 2019-06-21 RX ADMIN — SODIUM CHLORIDE: 4.5 INJECTION, SOLUTION INTRAVENOUS at 22:11

## 2019-06-21 RX ADMIN — HYDRALAZINE HYDROCHLORIDE 10 MG: 20 INJECTION INTRAMUSCULAR; INTRAVENOUS at 14:18

## 2019-06-21 RX ADMIN — SODIUM CHLORIDE: 4.5 INJECTION, SOLUTION INTRAVENOUS at 18:33

## 2019-06-21 RX ADMIN — LABETALOL 20 MG/4 ML (5 MG/ML) INTRAVENOUS SYRINGE 10 MG: at 20:33

## 2019-06-21 RX ADMIN — SODIUM CHLORIDE: 9 INJECTION, SOLUTION INTRAVENOUS at 14:16

## 2019-06-21 ASSESSMENT — ACTIVITIES OF DAILY LIVING (ADL)
DRESS: 0-->INDEPENDENT
AMBULATION: 0-->INDEPENDENT
RETIRED_EATING: 0-->INDEPENDENT
SWALLOWING: 0-->SWALLOWS FOODS/LIQUIDS WITHOUT DIFFICULTY
FALL_HISTORY_WITHIN_LAST_SIX_MONTHS: NO
TRANSFERRING: 0-->INDEPENDENT
COGNITION: 0 - NO COGNITION ISSUES REPORTED
TOILETING: 0-->INDEPENDENT
ADLS_ACUITY_SCORE: 12
ADLS_ACUITY_SCORE: 12
BATHING: 0-->INDEPENDENT
RETIRED_COMMUNICATION: 0-->UNDERSTANDS/COMMUNICATES WITHOUT DIFFICULTY

## 2019-06-21 ASSESSMENT — ENCOUNTER SYMPTOMS
BACK PAIN: 1
COLOR CHANGE: 1
SHORTNESS OF BREATH: 1
ABDOMINAL PAIN: 0
DIFFICULTY URINATING: 1

## 2019-06-21 ASSESSMENT — MIFFLIN-ST. JEOR: SCORE: 1983.34

## 2019-06-21 NOTE — PROGRESS NOTES
RECEIVING UNIT ED HANDOFF REVIEW    ED Nurse Handoff Report was reviewed by: Chani Fu on June 21, 2019 at 1:23 PM

## 2019-06-21 NOTE — ED NOTES
I have text charge on 88 to double check on shed read note not being enterd, they are waiting on another RN to arrive to take this patient. Charge is aware.

## 2019-06-21 NOTE — CONSULTS
See dictation.    Severe renal failure due to longstanding bladder outlet obstruction.  Potential for recovery with milligan placement is unknown, but kidneys do not look markedly atrophic by CT, and he has signs of a brisk post-obstructive diuresis at this point.  UO in last 3 hrs ~1.5L.  I will increase rate of IV fluid and adjust further as needed with more frequent UO determinations.  No indication for dialysis yet.  Watch chemistries at least daily.    Thanks.    Max Rubalcava MD  Nephrology; [facility]  [phone]

## 2019-06-21 NOTE — CONSULTS
Consult Date:  06/21/2019      REQUESTING PHYSICIAN:  Dr. Worthington.      HISTORY OF PRESENT ILLNESS:  Ras Esparza is a 64-year-old man whom we were asked to see to assist in evaluation and management of advanced renal failure.  Mr. Esparza saw Dr. Thakur for a first office visit yesterday.  He had not received medical care for many years.  He gave a history of a chronically poor urinary stream for at least several years.  He noted a firm uncomfortable mass in his lower abdomen.  He was complaining of back pain and also of relatively recent onset lower extremity edema.  The patient had lost about 50 pounds in the last year that he thought might be related to diet and exercise, although he noted that his exercise tolerance was less and his appetite was poor.  Noteworthy is a history of one-half pack per day smoking for many years.  He discontinued tobacco use about 1 month ago.      Dr. Thakur was concerned that the patient may have chronic kidney disease due to his difficulty voiding and lower extremity edema.  His examination included a digital rectal exam and he noted a large prostate with firmness and a nodule on the left side.  No rectal masses were noted.  A broad battery of blood tests were ordered and results were available today.  The patient was directed to the emergency room because BUN and creatinine were 98 and 9.59.  Electrolytes showed a potassium of 5, CO2 of 18 and an anion gap in the normal range at 13.  His liver function tests were normal.  TSH was normal.  PSA was 15.1.  N-terminal BNP was about 45,000.  Glucose was normal.  Calcium was somewhat low at 8.4, phosphorus was not checked.  Hemoglobin was 8.7 with normal red cell indices.  White blood cell count and platelets were normal.      When the patient was examined in the emergency room today, vital signs included an elevated blood pressure 160/105.  Other vital signs were normal.  He had normal room air oxygen saturation and had no respiratory  symptoms; blood tests showed similar results, BUN and creatinine 96 and 8.94.  Electrolytes are nearly identical.  Liver function tests again normal.  CBC unchanged from yesterday.  The urine was light in color.  It had a low specific gravity of 1.009 and trace dipstick proteinuria.  There were a few white and red cells per high power field, but no other sediment abnormalities.  A urine culture was obtained and is pending.  A chest x-ray showed a small pleural effusion on the left, but otherwise clear lungs.  The heart did not appear enlarged.  A CT scan of the abdomen and pelvis was obtained after placement of a Mayberry catheter.  With placement of the Mayberry 3 liters of urine was gradually allowed to drain out.  He did have some gross hematuria with decompression of the bladder, which now seems to be improving.  The CT scan showed bilateral hydronephrosis and hydroureter.  There were no kidney stones seen.  Bilateral perinephric soft tissue stranding was noted as well, more on the left than the right, thought to be related to chronic obstruction.  The bladder wall appeared thickened, almost certainly related to chronic bladder outlet obstruction.  There was vascular calcification in a diffuse distribution.  The patient had 3-4+ edema.  A lower extremity venous ultrasound showed no sign of venous thrombosis.  His back discomfort was addressed with plain films of the thoracic and lumbar spine.  The result of this is pending.      Mr. Esparza was placed on IV fluids, normal saline infusion at 100 mL per hour.  He was placed on a renal diet.  P.r.n. antihypertensive therapy with hydralazine and labetalol has been ordered.      PHYSICAL EXAMINATION:   GENERAL:  Mr. Esparza is an alert, lucid man who looks older than his chronologic age.  He is in no distress at this time.   VITAL SIGNS:  His temperature is normal.  Pulse is regular, rate about 90, blood pressure soham as high as 173/111, but is now 149/90, respirations  are about 20 and unlabored.  Room air saturation is 99%.   SKIN:  Shows satisfactory color and turgor.  There are no skin lesions of concern.   HEENT AND NECK:  Unremarkable.  There is no JVD.  Carotid pulses are strong and symmetric without bruits.  Mucous membranes are pink and moist.   LUNGS:  Clear.   HEART:  Normal, without murmur, rub or gallop.  Peripheral pulses are strong and symmetric.   ABDOMEN:  Soft, bowel sounds are normally active.  No tenderness, masses or organomegaly are appreciated.  No bruits are heard.   EXTREMITIES:  Warm.  There are no joint abnormalities.  Dependent edema about 2+ to the knees is seen bilaterally.   NEUROLOGIC:  Nonfocal.      ASSESSMENT:  Mr. Esparza's advanced renal failure is related to longstanding bladder outlet obstruction, likely due to benign prostatic hypertrophy.  He will need further prostate evaluation by Urology to consider a diagnosis of prostate cancer.  Urology has seen the patient; there are plans for continuing Mayberry catheter care for the next few weeks and then followup in the outpatient setting.  The patient appears stable at this time.  He shows no indication for urgent dialysis therapy.  His urine output appears to be substantial in the aftermath of initial bladder emptying.  In the last 3 hours he has voided approximately 1.5 liters.  I suspect that he will have at least a moderately prolonged postobstructive diuresis.  I will alter the patient's IV therapy at this point to keep up with the urine output, which will be monitored with no less often than every 2 hours.  He is appropriately on a renal diet.  I have instructed him to drink fluids freely.      Thank you for the opportunity to assist in Mr. Esparza's care.  Our group will follow as appropriate during the remainder of his hospital stay.  He will, as well, need outpatient nephrology care once a discharge plan has been determined.         ELENITA LINARES MD             D: 06/21/2019   T:  2019   MT: FABIAN      Name:     MARIFER DONIS   MRN:      [id number]        Account:       [id number]   :      1955           Consult Date:  2019      Document: [id number]       cc: Krzysztof Thakur MD

## 2019-06-21 NOTE — LETTER
[facility] [address] [facility]  [address]  [place] [address]  [phone]          June 25, 2019    RE:  Ras Esparza                                                                                                                                                       [address] [facility] [address]            To whom it may concern:    Ras Esparza is under my professional care at [facility], where he was hospitalized from 6/21/2019 to 6/25/2019. At this point, given his current medical condition, he is not recommended to return to work. The minimum duration is at least once week; he is recommended to follow up with his primary care physician in about a week where further recommendations can be made.    Sincerely,             Eloy Ceron MD

## 2019-06-21 NOTE — PLAN OF CARE
"Increased diastolic BP at 162/115. MD notified and PRN hydralazine parameters updated. PRN hydralazine administered. No pain reported. Burn on back from reported \"hot pack use.\" No open areas. +2 pitting edema in lower extremities. Lung sounds diminished, no shortness of breath reported. Mayberry patent and draining, bright red output noted. MD paged and urology order placed STAT. Pt down to US. Family at bedside, active in cares. Will continue to monitor.  "

## 2019-06-21 NOTE — CONSULTS
[facility]    Urology Consultation     Date of Admission:  6/21/2019    Assessment & Plan   Ras Esparza is a 64 year old male who was admitted on 6/21/2019. I was asked to see the patient for bladder outlet obstruction with bilateral hydronephrosis on CT and LEIGHA with creat of 9, milligan placed on admission for >3L, PSA elevated to 15.    Plan:   -Continue milligan x 2-3wks   -Follow creat, nephrology following   -Recheck for resolution of hydronephrosis with renal US in 2-3 days   -Hematuria likely due to bladder decompression, should resolve    -Should follow up in urology office in 2-3wks for further prostate and bladder evaluation     Mariana Morgan PA-C  [facility]   [email or web address]  Text Page (7:30am to 3:30pm)    Code Status    Full Code    Reason for Consult   Reason for consult: I was asked by Dr. Worthington to evaluate this patient for bladder outlet obstruction with bilateral obstruction and gross hematuria after milligan placement for 3L.    Primary Care Physician   Krzysztof Thakur    Chief Complaint   LEIGHA     History is obtained from the patient    History of Present Illness   aRs Esparza is a 64 year old male who presents from his primary care clinic for evaluation and management of LEIGHA with creatinine elevated to 9. On admission, milligan was placed for >3L of urine, outputs are now light cherry. Patient notes approx 1 year of incomplete emptying and decreased urinary stream. CT shows bilateral hydronephrosis with decompressed bladder, milligan already placed. Denies any UTIs or gross hematuria in the past. PSA on this admission is 15. Patient has not followed with a provider for many years.     From admission H&P: Ras Esparza is a 64 year old male who has not seen primary care physician for routine checkup for several years and so is not aware of any medical problems who was sent to emergency room by his physician because of abnormal creatinine when he  presented to his primary care provider yesterday due to worsening lower extremity edema and intermittent back pain.When he went to his PCP yesterday mainly because his lower extremity started swelling, and labs were drawn and he was found to have a creatinine of 9.59 with a BUN of 98, proBNP 45 492, PSA 15 TSH 0.85 hemoglobin 8.7 and hematocrit 27.9.  Results came back today and so he was advised to come to the ED.      Patient reports that his symptoms have been ongoing for several months now.  He had been noticing poor streaming of urine for several months now associated with pain prior to urination which improves after he is able to void somewhat.  He denies any hematuria. Also he has noticed decreased urine quantity for last several weeks.   He was noted to have suprapubic mass when he presented to the ED, which he reports has been there for several months.  Bladder scan was done and he already has more than 3 L out after Mayberry catheter was placed.  He reports he is mass is gone right now and feels much more comfortable.  He has also been noticing bilateral lower extremity swelling for last several weeks.  He denies any pain in his legs.  He also noticed shortness of breath more than his baseline.  He reports normally he can mow the lawn almost half of his two third of acre without stopping, however recently he has to stop every 10 minutes or so mainly for extreme fatigue and shortness of breath.  He denies any chest pain, palpitation, dizziness, lightheadedness, fever or cough.  Also he has been noticing pain in his lower back, mainly in the spine which does not radiate to his legs, however it comes to his abdomen at times.     When he presented to the ED he was found to have a blood pressure of 187/100, pulse rate 147, temperature 97.6, O2 sat 95% on room air.  Labs showed sodium of 142, potassium 4.7 chloride 110, BUN 96, creatinine 8.94 white count 6.9 hemoglobin is 8.9 hematocrit 27.7 platelet 228, UA with  trace amount of leukocyte esterase and increased WBC, culture have been sent.CT abdomen and pelvis with bilateral hydronephrosis and hydroureter with no urinary tract calculi, this is likely chronic and related to bladder outlet obstruction.  Thick-walled decompressed urinary bladder with Mayberry catheter and vascular calcification.  Chest x-ray shows posterior small left pleural effusion remainder of the lungs are clear bilaterally heart size is probably normal considering AP technique      He does have history of hemorrhoidal bleeding.  He reports whenever he is constipated he has little amount of edgardo blood in the toilet paper when he wipes his bowel movement.  He also complains of poor appetite but denies nausea or vomiting.  He however reports to intentional weight loss with intermittent fasting for about 4 months.  He has lost close to 45 to 50 pounds in the last 4 to 5 months.    Past Medical History   I have reviewed this patient's medical history and updated it with pertinent information if needed.   Past Medical History:   Diagnosis Date     Tobacco abuse        Past Surgical History   I have reviewed this patient's surgical history and updated it with pertinent information if needed.  No past surgical history on file.    Prior to Admission Medications   Prior to Admission Medications   Prescriptions Last Dose Informant Patient Reported? Taking?   ibuprofen (ADVIL/MOTRIN) 200 MG tablet PRN Self Yes Yes   Sig: Take 100-200 mg by mouth daily as needed for mild pain      Facility-Administered Medications: None     Allergies   No Known Allergies    Social History   I have reviewed this patient's social history and updated it with pertinent information if needed. Ras Esparza  reports that he quit smoking about 4 weeks ago. His smoking use included cigarettes. He smoked 0.50 packs per day. He has never used smokeless tobacco. He reports that he drank alcohol. He reports that he does not use drugs.    Family  History   I have reviewed this patient's family history and updated it with pertinent information if needed.   Family History   Problem Relation Age of Onset     Glaucoma Mother      Throat cancer Father      Rheumatoid Arthritis Sister      Alcoholism Brother      Liver Disease Brother        Review of Systems   The 10 point Review of Systems is negative other than noted in the HPI or here.     Physical Exam   Temp: 97.7  F (36.5  C) Temp src: Oral BP: 149/90 Pulse: 90   Resp: 19 SpO2: 99 % O2 Device: None (Room air)    Vital Signs with Ranges  Temp:  [97.6  F (36.4  C)-97.7  F (36.5  C)] 97.7  F (36.5  C)  Pulse:  [] 90  Resp:  [19-20] 19  BP: (149-187)/() 149/90  SpO2:  [95 %-100 %] 99 %  256 lbs 0 oz    Constitutional: Sitting up in bed, NAD; sister at bedside   Eyes: no icterus  ENT: normocephalic   Respiratory: breathing unlabored   Cardiovascular: chest wall symmetric   GI: soft, NT, ND; no CVAT bilaterally   Lymph/Hematologic: bilateral le edema   Genitourinary: milligan in place and draining cherry colored urine   Skin: well perfused   Musculoskeletal: moves all extremities   Neurologic: no focal deficits   Neuropsychiatric: A&Ox3    Data   Results for orders placed or performed during the hospital encounter of 06/21/19 (from the past 24 hour(s))   EKG 12 lead   Result Value Ref Range    Interpretation ECG Click View Image link to view waveform and result    Comprehensive metabolic panel   Result Value Ref Range    Sodium 142 133 - 144 mmol/L    Potassium 4.7 3.4 - 5.3 mmol/L    Chloride 110 (H) 94 - 109 mmol/L    Carbon Dioxide 18 (L) 20 - 32 mmol/L    Anion Gap 14 3 - 14 mmol/L    Glucose 95 70 - 99 mg/dL    Urea Nitrogen 96 (H) 7 - 30 mg/dL    Creatinine 8.94 (H) 0.66 - 1.25 mg/dL    GFR Estimate 6 (L) >60 mL/min/[1.73_m2]    GFR Estimate If Black 7 (L) >60 mL/min/[1.73_m2]    Calcium 8.7 8.5 - 10.1 mg/dL    Bilirubin Total 0.6 0.2 - 1.3 mg/dL    Albumin 3.3 (L) 3.4 - 5.0 g/dL    Protein Total  7.1 6.8 - 8.8 g/dL    Alkaline Phosphatase 80 40 - 150 U/L    ALT 38 0 - 70 U/L    AST 15 0 - 45 U/L   CBC with platelets differential   Result Value Ref Range    WBC 6.9 4.0 - 11.0 10e9/L    RBC Count 2.98 (L) 4.4 - 5.9 10e12/L    Hemoglobin 8.9 (L) 13.3 - 17.7 g/dL    Hematocrit 27.7 (L) 40.0 - 53.0 %    MCV 93 78 - 100 fl    MCH 29.9 26.5 - 33.0 pg    MCHC 32.1 31.5 - 36.5 g/dL    RDW 15.4 (H) 10.0 - 15.0 %    Platelet Count 228 150 - 450 10e9/L    Diff Method Automated Method     % Neutrophils 74.4 %    % Lymphocytes 16.4 %    % Monocytes 5.8 %    % Eosinophils 2.9 %    % Basophils 0.4 %    % Immature Granulocytes 0.1 %    Nucleated RBCs 0 0 /100    Absolute Neutrophil 5.1 1.6 - 8.3 10e9/L    Absolute Lymphocytes 1.1 0.8 - 5.3 10e9/L    Absolute Monocytes 0.4 0.0 - 1.3 10e9/L    Absolute Eosinophils 0.2 0.0 - 0.7 10e9/L    Absolute Basophils 0.0 0.0 - 0.2 10e9/L    Abs Immature Granulocytes 0.0 0 - 0.4 10e9/L    Absolute Nucleated RBC 0.0    UA with Microscopic   Result Value Ref Range    Color Urine Light Yellow     Appearance Urine Clear     Glucose Urine Negative NEG^Negative mg/dL    Bilirubin Urine Negative NEG^Negative    Ketones Urine Negative NEG^Negative mg/dL    Specific Gravity Urine 1.009 1.003 - 1.035    Blood Urine Negative NEG^Negative    pH Urine 6.5 5.0 - 7.0 pH    Protein Albumin Urine 10 (A) NEG^Negative mg/dL    Urobilinogen mg/dL Normal 0.0 - 2.0 mg/dL    Nitrite Urine Negative NEG^Negative    Leukocyte Esterase Urine Trace (A) NEG^Negative    Source Catheterized Urine     WBC Urine 12 (H) 0 - 5 /HPF    RBC Urine 2 0 - 2 /HPF    Mucous Urine Present (A) NEG^Negative /LPF   Urine Culture   Result Value Ref Range    Specimen Description Midstream Urine     Special Requests Specimen received in preservative     Culture Micro PENDING    Abd/pelvis CT no contrast - Stone Protocol    Narrative    CT ABDOMEN AND PELVIS WITHOUT CONTRAST 6/21/2019 12:06 PM    HISTORY: Elevated creatinine. Evaluate  for mass or urinary retention.    TECHNIQUE: Helical axial scans from the dome of liver through the  pubic symphysis without contrast. Radiation dose for this scan was  reduced using automated exposure control, adjustment of the mA and/or  kV according to patient size, or iterative reconstruction technique.    COMPARISON: None.    FINDINGS: Small left pleural effusion with adjacent atelectatic  change. The liver, spleen, pancreas and bilateral adrenal glands  appear normal to the extent visualized without IV contrast. Vascular  calcifications are seen.    There is bilateral hydronephrosis and hydroureter. No urinary tract  calculi. Bilateral perinephric soft tissue stranding which is greater  on the left than the right and is likely related to chronic  obstruction. The bowel and mesentery in the upper abdomen are  unremarkable.    Scans through the pelvis show a Mayberry catheter in a decompressed  urinary bladder. Urinary bladder wall appears thickened which may be  related to chronic bladder outlet obstruction. No free fluid or acute  appearing abnormality. The appendix is identified and appears normal.      Impression    IMPRESSION:  1. Bilateral hydronephrosis and hydroureter with no urinary tract  calculi. This is likely chronic and related to bladder outlet  obstruction.  2. Thick-walled decompressed urinary bladder with Mayberry catheter.  3. Vascular calcifications.    SUJIT DHILLON MD   XR Chest 2 Views    Narrative    CHEST TWO VIEWS  6/21/2019 1:21 PM     HISTORY: RAMON (dyspnea on exertion).    COMPARISON: None.      Impression    IMPRESSION: Posterior small left pleural effusion. Remainder of the  lungs are clear bilaterally. Heart size is probably normal considering  AP technique.    SUJIT DHILLON MD   US Lower Extremity Venous Duplex Bilateral    Narrative    PROCEDURE:  Venous Doppler ultrasound of the bilateral lower extremities    DATE OF PROCEDURE:  6/21/2019 2:58 PM    CLINICAL HISTORY/INDICATION:    Evaluate for DVT, swelling in the legs    COMPARISON:   None relevant    TECHNIQUE:   Grayscale, color-flow, and spectral waveform analysis were performed  of the deep veins of the bilateral lower extremities    FINDINGS:   The right common femoral vein, superficial femoral vein and popliteal  vein demonstrate normal compressibility, spectral waveform, color flow  and augmentation.    The left common femoral vein, superficial femoral vein and popliteal  vein demonstrate normal compressibility, spectral waveform, color flow  and augmentation.    The right posterior tibial vein, peroneal vein, and greater saphenous  vein are compressible.    The left posterior tibial vein, peroneal vein, and greater saphenous  vein are compressible      Impression    IMPRESSION:   1. No evidence of deep venous thrombosis in the right lower extremity  2. No evidence of deep venous thrombosis in the left lower extremity    LIANNA LORENZO MD

## 2019-06-21 NOTE — PLAN OF CARE
A/Ox4. Hypertensive, did not meet parameters for PRN. Other VSS. Denies pain. Up SBA. Renal diet. Mayberry in place, large amount of red output. Orders for UOP measured q2hrs and notify if >600mL in 2hrs. Nephrology and urology following. +2 edema in BLE - US negative for DVT. CT on abdomen pelvis completed, see results. Will continue to monitor.

## 2019-06-21 NOTE — PHARMACY-ADMISSION MEDICATION HISTORY
Admission medication history interview status for the 6/21/2019  admission is complete. See EPIC admission navigator for prior to admission medications     Medication history source reliability:Good    Actions taken by pharmacist (provider contacted, etc):Interviewed pt and reviewed information in care everywhere and Epic       Additional medication history information not noted on PTA med list :Pt states he takes no routine medication - he occasionally take Advil 1/2 -1 tab if needed    Medication reconciliation/reorder completed by provider prior to medication history? No    Time spent in this activity: 10 minutes    Prior to Admission medications    Medication Sig Last Dose Taking? Auth Provider   ibuprofen (ADVIL/MOTRIN) 200 MG tablet Take 100-200 mg by mouth daily as needed for mild pain PRN Yes Unknown, Entered By History

## 2019-06-21 NOTE — ED NOTES
"[facility]  ED Nurse Handoff Report    ED Chief complaint: Weight Loss (Weight loss, renal failure, edema, mass (see EPPA Triage Report Sheet. Referred by PCP.)      ED Diagnosis:   Final diagnoses:   None       Code Status: Full Code    Allergies: No Known Allergies    Activity level - Baseline/Home:  Independent  Activity Level - Current:   Independent    Patient's Preferred language: ENDGLISH   Needed?: No    Isolation: No  Infection: Not Applicable  Bariatric?: No    Vital Signs:   Vitals:    06/21/19 1028 06/21/19 1035 06/21/19 1132 06/21/19 1134   BP: (!) 187/100  (!) 160/105    Pulse: 147 100 91    Resp: 20      Temp: 97.6  F (36.4  C)      TempSrc: Temporal      SpO2: 95%   100%   Weight: 116.1 kg (256 lb)      Height: 1.803 m (5' 11\")          Cardiac Rhythm: ,        Pain level: 0-10 Pain Scale: 3    Is this patient confused?: No   Does this patient have a guardian?  No         If yes, is there guardianship documents in the Epic \"Code/ACP\" activity?  N/A         Guardian Notified?  N/A  [place] - Suicide Severity Rating Scale Completed?  Yes  If yes, what color did the patient score?  White    Patient Report: Initial Complaint: Bilateral lower extremity edema and urinary retention  Focused Assessment: alert and calm   Tests Performed: labs   Abnormal Results:   Abnormal Labs Reviewed   ROUTINE UA WITH MICROSCOPIC - Abnormal; Notable for the following components:       Result Value    Protein Albumin Urine 10 (*)     Leukocyte Esterase Urine Trace (*)     WBC Urine 12 (*)     Mucous Urine Present (*)     All other components within normal limits   COMPREHENSIVE METABOLIC PANEL - Abnormal; Notable for the following components:    Chloride 110 (*)     Carbon Dioxide 18 (*)     Urea Nitrogen 96 (*)     Creatinine 8.94 (*)     GFR Estimate 6 (*)     GFR Estimate If Black 7 (*)     Albumin 3.3 (*)     All other components within normal limits   CBC WITH PLATELETS DIFFERENTIAL - " Abnormal; Notable for the following components:    RBC Count 2.98 (*)     Hemoglobin 8.9 (*)     Hematocrit 27.7 (*)     RDW 15.4 (*)     All other components within normal limits     Treatments provided: milligan placed and admission    Family Comments: wife was called and notified by patient     OBS brochure/video discussed/provided to patient/family: N/A              Name of person given brochure if not patient: na              Relationship to patient: na    ED Medications:   Medications   lidocaine (XYLOCAINE) 2 % external gel 10 mL (6 mLs Urethral Given 6/21/19 1122)       Drips infusing?:  No    For the majority of the shift this patient was Green.   Interventions performed were na.    Severe Sepsis OR Septic Shock Diagnosis Present: No    To be done/followed up on inpatient unit:  monitor output vitals and edema     ED NURSE PHONE NUMBER: [phone]

## 2019-06-21 NOTE — ED PROVIDER NOTES
"  History     Chief Complaint:  Lower Extremity Edema & Periumbilical Mass    The history is provided by the patient and medical records.      Ras Esparza is a 64 year old male who presents with lower extremity edema and a periumbilical mass. Ras has had at least 5 years of difficulties urinating, slowly progressing. Typically he urinates only a small amount during the day but then is incontinent at night. He has also also been experiencing lower back discomfort and aching for about 9 months. He has been treating with a heating pad. Two months ago he also started noticing dyspnea with exertion. Finally, about 2 weeks ago he noticed swelling in his bilateral lower extremities. He had not seen a physician in many years so decided to get a check up due to his edema. He was seen by primary care yesterday. There it was noticed he had a firm \"mass\" inferior to his umbilicus which Ras states is not painful and has been there \"for years and years.\" Laboratory studies were obtained and he was called and told to come to the ER today due to creatinine of 9.59. (Full laboratory study results below.) Ras has had no pain in his legs and denies chest pain.    Notably, he has recently stopped smoking and drinking alcohol. He also lost 47 lbs in the past 5 months, but he has been intentionally dieting in an attempt to lose weight.     Lab results 6/20/19  N-Terminal Pro BNP: 45,492 (H)  PSA: 15.10 (H)  TSH with free T4: 0.85  Hepatitis C Antibody: Nonreactive  CMP: Creatinine 9.59 (H), Chloride 110 (H), Carbon Dioxide 18 (L), Glucose 100 (H), Urea Nitrogen 98 (H), GFR Estimate 5 (L), o/w AWNL  CBC: HGB 8.7 (L), RBC 2.90 (L), HCT 27.9 (L), MCHC 31.2 (L), RDW 15.2 (H), o/w AWNL (WBC 8.0, )    Allergies:  No known drug allergies.     Medications:    The patient is not currently taking any prescribed medications.    Past Medical History:    The patient does not have any past pertinent medical history.    Past " "Surgical History:    History reviewed. No pertinent surgical history.    Family History:    Glaucoma  Throat Cancer  Rheumatoid Arthritis  Alcoholism  Liver Disease    Social History:  Smoking Status: Former Smoker (quit 2 months ago)  Alcohol Use: No (quit 1 year ago)  Patient presents alone.  Marital Status:       Review of Systems   Respiratory: Positive for shortness of breath.    Cardiovascular: Positive for leg swelling (bilateral). Negative for chest pain.   Gastrointestinal: Negative for abdominal pain.        + Infraumbilical \"mass\"   Genitourinary: Positive for difficulty urinating.   Musculoskeletal: Positive for back pain.        - Leg Pain   Skin: Positive for color change (secondary to heating pack use).   All other systems reviewed and are negative.    Physical Exam     Patient Vitals for the past 24 hrs:   BP Temp Temp src Pulse Resp SpO2 Height Weight   06/21/19 1134 -- -- -- -- -- 100 % -- --   06/21/19 1132 (!) 160/105 -- -- 91 -- -- -- --   06/21/19 1035 -- -- -- 100 -- -- -- --   06/21/19 1028 (!) 187/100 97.6  F (36.4  C) Temporal 147 20 95 % 1.803 m (5' 11\") 116.1 kg (256 lb)     Physical Exam  General: Well-developed and well-nourished. Well appearing middle aged  man. Cooperative.  Head:  Atraumatic.  Eyes:  Conjunctivae, lids, and sclerae are normal.  ENT:    Normal nose. Moist mucous membranes.  Neck:  Supple. Normal range of motion.  CV:  Regular rate and rhythm. Normal heart sounds with no murmurs, rubs, or gallops detected.  Resp:  No respiratory distress. Clear to auscultation bilaterally without decreased breath sounds, wheezing, rales, or rhonchi.  GI:  Soft. Non-tender. Firm distention to the abdomen midline infraumbilical.   MS:  Normal ROM. 1-2+ bilateral lower extremity edema.  Skin:  Warm. Non-diaphoretic. No pallor. Dark discoloration of skin on lower midline back.  Neuro: Awake. A&Ox3. Normal strength.  Psych:  Normal mood and affect. Normal speech.  Vitals " reviewed.    Emergency Department Course     EKG  Indication:Bilateral lower extremity edema  Time: 10:32:47  Rate 99 bpm. VA interval 164 ms. QRS duration 88 ms. QT/QTc 348/446 ms.   Normal sinus rhythm.  T wave abnormality, consider lateral ischemia.  Abnormal EKG.   No acute ST changes.  No prior EKG for comparison.     Imaging:  Radiographic findings were communicated with the patient who voiced understanding of the findings.    Abd/pelvis CT no contrast - Stone Protocol  1. Bilateral hydronephrosis and hydroureter with no urinary tract  calculi. This is likely chronic and related to bladder outlet  obstruction.  2. Thick-walled decompressed urinary bladder with Mayberry catheter.  3. Vascular calcifications.  As read by radiology.    XR Chest 2 Views  Posterior small left pleural effusion. Remainder of the  lungs are clear bilaterally. Heart size is probably normal considering  AP technique.  As read by radiology.    Laboratory:    CMP: Creatinine 8.94 (H), Chloride 110 (H), Carbon Dioxide 18 (L), Urea Nitrogen 96 (H), GFR Estimate 6 (L), Albumin 3.3 (L), o/w AWNL    CBC: HGB 8.9 (L), HCT 27.7 (L), RDW 15.4 (H), o/w AWNL (WBC 6.9, )    UA: Protein Albumin Urine 10 (A), Leukocyte Esterase Urine Trace (A), WBC Urine 12 (H), Mucous Urine Present (A), o/w Negative    Urine Culture: Pending    Interventions:    1122: Xylocaine 6 mL urethral    Emergency Department Course:  Past medical records, nursing notes, and vitals reviewed.  1036: I performed an exam of the patient and obtained history, as documented above.     1042: Bedside ultrasound of bladder was completed.    1053: The patient was able to urinate 100 ml's and repeat bedside ultrasound reveals persistently enlarged bladder.     IV inserted and blood drawn.    The patient had a Mayberry catheter placed.     1130: I rechecked the patient. He had had 2100 ml's of urine from his bladder. After this was voided the distention in his abdomen was resolved.  "Explained findings to the patient.    The patient was sent for a CT scan while in the emergency department, findings above.    1234: Rechecked the patient. He had about 300 ml's more urine output. Findings and plan explained to the patient, who consents to admission.     1241: Discussed the patient with Dr. Worthington, who will admit the patient to a monitored bed for further observation, evaluation, and treatment.    1332: I updated the patient. More urine output noted which is now bloody.     Impression & Plan      Medical Decision Making:  Ras is a 64 year old man who has not seen a physician in several years who has had increasing difficultly urinating over the last several years with the development of lower back pain several months ago and then some shortness of breath with exertion of a couple of months ago.  Finally, he developed lower extremity edema a couple of weeks ago which prompted him to visit a primary care provider.  In addition to the lower extremity edema he was noted to have \"mass\" infraumbilically and labs were sent.  Unfortunately, creatinine came back significantly elevated at 9 prompting his referral to the emergency department.  Notably, he has also had weight loss though he states this was intentional.  On exam, Ras appears well.  He does have a 1 to 2+ lower extremity edema and firm distention to his infraumbilical midline abdomen.  Quick bedside ultrasound confirms this distention is a massively enlarged bladder.  He was able to urinate about 100 cc at bedside and then Mayberry catheter was placed with large quantity of urine output while in the emergency department totaling at least 2.5 L.  This infraumbilical distention resolved with placement of Mayberry catheter.  There is no convincing evidence for urinary tract infection on urinalysis though urine culture was sent to help further characterize.  Repeat labs today confirm continued significantly elevated creatinine at 8.94 with a BUN of 96 " and bicarb of 18.  The remainder of the electrolytes are reassuring suggesting this is probably a somewhat slow process though he has no prior creatinine for comparison.  There is no evidence of biliary obstruction, hepatitis, or leukocytosis.  He does have normocytic anemia to 8.9 of unknown chronicity with no prior for comparison.  After Mayberry catheter was placed, patient was sent for CT of the abdomen and pelvis without contrast.  This reveals bilateral hydronephrosis and hydroureter as well as a thick walled decompressed urinary bladder.  Patient was without complaint while under my care and we discussed the results of the laboratory and imaging studies as well as need for admission to monitor his creatinine with likely plan for consultation with urology and possibly nephrology.  Patient verbalized understanding and is amenable.  It should be noted that his urine output became bloody prior to transfer to thee floor though initial 2 L were not hematuric.  I discussed patient's case with Dr. Worthington, hospitalist, who accepts admission.  She requests addition of chest X-ray given patient's dyspnea on exertion.  This was performed in the emergency department that shows a small left pleural effusion without other acute pathology.  It should be noted that his EKG is reassuring although there are some T wave inversions noted in I, III, aVL, and V5 to V6 of uncertain significance.  BNP was completed as an outpatient yesterday and was significantly elevated to 45,000 though with his creatinine this is difficult to interpret.  It is unclear if patient's lower extremity edema and dyspnea on exertion with elevated BNP are reflective of a cardiac process or not.  I suspect the primary source of his symptoms is his prostate and he did have an elevated PSA yesterday.  With his lower back pain this raises concern for BPH versus malignancy.  Further, patient has persistently elevated blood pressures and I suspect he has  undiagnosed hypertension.  Patient was transferred to floor in stable condition with Mayberry catheter in place.    Diagnosis:    ICD-10-CM    1. Renal failure, unspecified chronicity N19    2. Lower urinary tract obstruction N13.9    3. Urinary retention R33.9    4. Other hydronephrosis N13.39    5. Acquired hydroureter N13.4    6. Elevated blood pressure reading without diagnosis of hypertension R03.0    7. Normocytic anemia D64.9        Disposition:  Admitted to hospital.       Melanie Govea  6/21/2019    EMERGENCY DEPARTMENT  I, Melanie Govea, am serving as a scribe at 10:36 AM on 6/21/2019 to document services personally performed by Abigail Mendenhall MD based on my observations and the provider's statements to me.        Abigail Mendenhall MD  06/22/19 [id number]

## 2019-06-22 ENCOUNTER — APPOINTMENT (OUTPATIENT)
Dept: CARDIOLOGY | Facility: CLINIC | Age: 64
DRG: 699 | End: 2019-06-22
Attending: INTERNAL MEDICINE
Payer: COMMERCIAL

## 2019-06-22 LAB
ANION GAP SERPL CALCULATED.3IONS-SCNC: 10 MMOL/L (ref 3–14)
BACTERIA SPEC CULT: NO GROWTH
BUN SERPL-MCNC: 82 MG/DL (ref 7–30)
CALCIUM SERPL-MCNC: 7.7 MG/DL (ref 8.5–10.1)
CHLORIDE SERPL-SCNC: 113 MMOL/L (ref 94–109)
CO2 SERPL-SCNC: 19 MMOL/L (ref 20–32)
CREAT SERPL-MCNC: 7.5 MG/DL (ref 0.66–1.25)
ERYTHROCYTE [DISTWIDTH] IN BLOOD BY AUTOMATED COUNT: 15.5 % (ref 10–15)
GFR SERPL CREATININE-BSD FRML MDRD: 7 ML/MIN/{1.73_M2}
GLUCOSE SERPL-MCNC: 95 MG/DL (ref 70–99)
HCT VFR BLD AUTO: 27.7 % (ref 40–53)
HGB BLD-MCNC: 9 G/DL (ref 13.3–17.7)
Lab: NORMAL
MAGNESIUM SERPL-MCNC: 1.5 MG/DL (ref 1.6–2.3)
MCH RBC QN AUTO: 30.4 PG (ref 26.5–33)
MCHC RBC AUTO-ENTMCNC: 32.5 G/DL (ref 31.5–36.5)
MCV RBC AUTO: 94 FL (ref 78–100)
PHOSPHATE SERPL-MCNC: 7.7 MG/DL (ref 2.5–4.5)
PLATELET # BLD AUTO: 225 10E9/L (ref 150–450)
POTASSIUM SERPL-SCNC: 4.4 MMOL/L (ref 3.4–5.3)
RBC # BLD AUTO: 2.96 10E12/L (ref 4.4–5.9)
SODIUM SERPL-SCNC: 142 MMOL/L (ref 133–144)
SPECIMEN SOURCE: NORMAL
WBC # BLD AUTO: 8.1 10E9/L (ref 4–11)

## 2019-06-22 PROCEDURE — 84100 ASSAY OF PHOSPHORUS: CPT | Performed by: INTERNAL MEDICINE

## 2019-06-22 PROCEDURE — 85027 COMPLETE CBC AUTOMATED: CPT | Performed by: INTERNAL MEDICINE

## 2019-06-22 PROCEDURE — 83735 ASSAY OF MAGNESIUM: CPT | Performed by: INTERNAL MEDICINE

## 2019-06-22 PROCEDURE — 80048 BASIC METABOLIC PNL TOTAL CA: CPT | Performed by: INTERNAL MEDICINE

## 2019-06-22 PROCEDURE — 12000000 ZZH R&B MED SURG/OB

## 2019-06-22 PROCEDURE — 36415 COLL VENOUS BLD VENIPUNCTURE: CPT | Performed by: INTERNAL MEDICINE

## 2019-06-22 PROCEDURE — 25000132 ZZH RX MED GY IP 250 OP 250 PS 637: Performed by: INTERNAL MEDICINE

## 2019-06-22 PROCEDURE — 93306 TTE W/DOPPLER COMPLETE: CPT | Mod: 26 | Performed by: INTERNAL MEDICINE

## 2019-06-22 PROCEDURE — 99222 1ST HOSP IP/OBS MODERATE 55: CPT | Performed by: INTERNAL MEDICINE

## 2019-06-22 PROCEDURE — 25500064 ZZH RX 255 OP 636: Performed by: INTERNAL MEDICINE

## 2019-06-22 PROCEDURE — 99233 SBSQ HOSP IP/OBS HIGH 50: CPT | Performed by: INTERNAL MEDICINE

## 2019-06-22 PROCEDURE — 40000264 ECHOCARDIOGRAM COMPLETE

## 2019-06-22 PROCEDURE — 25800029 ZZH RX IP 258 OP 250: Performed by: INTERNAL MEDICINE

## 2019-06-22 RX ORDER — MAGNESIUM OXIDE 400 MG/1
400 TABLET ORAL 2 TIMES DAILY
Status: DISCONTINUED | OUTPATIENT
Start: 2019-06-22 | End: 2019-06-25 | Stop reason: HOSPADM

## 2019-06-22 RX ORDER — CARVEDILOL 6.25 MG/1
6.25 TABLET ORAL 2 TIMES DAILY WITH MEALS
Status: DISCONTINUED | OUTPATIENT
Start: 2019-06-22 | End: 2019-06-23

## 2019-06-22 RX ORDER — METOPROLOL TARTRATE 50 MG
50 TABLET ORAL 2 TIMES DAILY
Status: DISCONTINUED | OUTPATIENT
Start: 2019-06-22 | End: 2019-06-22

## 2019-06-22 RX ADMIN — SODIUM CHLORIDE: 4.5 INJECTION, SOLUTION INTRAVENOUS at 04:05

## 2019-06-22 RX ADMIN — SODIUM CHLORIDE: 4.5 INJECTION, SOLUTION INTRAVENOUS at 16:02

## 2019-06-22 RX ADMIN — HUMAN ALBUMIN MICROSPHERES AND PERFLUTREN 3 ML: 10; .22 INJECTION, SOLUTION INTRAVENOUS at 10:30

## 2019-06-22 RX ADMIN — MAGNESIUM OXIDE TAB 400 MG (241.3 MG ELEMENTAL MG) 400 MG: 400 (241.3 MG) TAB at 20:44

## 2019-06-22 RX ADMIN — SODIUM CHLORIDE: 4.5 INJECTION, SOLUTION INTRAVENOUS at 09:15

## 2019-06-22 RX ADMIN — SODIUM CHLORIDE: 4.5 INJECTION, SOLUTION INTRAVENOUS at 23:22

## 2019-06-22 RX ADMIN — MAGNESIUM OXIDE TAB 400 MG (241.3 MG ELEMENTAL MG) 400 MG: 400 (241.3 MG) TAB at 14:25

## 2019-06-22 RX ADMIN — SODIUM CHLORIDE: 4.5 INJECTION, SOLUTION INTRAVENOUS at 19:48

## 2019-06-22 RX ADMIN — SODIUM CHLORIDE: 4.5 INJECTION, SOLUTION INTRAVENOUS at 06:38

## 2019-06-22 RX ADMIN — METOPROLOL TARTRATE 50 MG: 50 TABLET ORAL at 14:25

## 2019-06-22 RX ADMIN — SODIUM CHLORIDE: 4.5 INJECTION, SOLUTION INTRAVENOUS at 01:35

## 2019-06-22 RX ADMIN — CARVEDILOL 6.25 MG: 6.25 TABLET, FILM COATED ORAL at 17:19

## 2019-06-22 RX ADMIN — SODIUM CHLORIDE: 4.5 INJECTION, SOLUTION INTRAVENOUS at 11:58

## 2019-06-22 ASSESSMENT — ACTIVITIES OF DAILY LIVING (ADL)
ADLS_ACUITY_SCORE: 14
ADLS_ACUITY_SCORE: 14
ADLS_ACUITY_SCORE: 12
ADLS_ACUITY_SCORE: 14

## 2019-06-22 ASSESSMENT — MIFFLIN-ST. JEOR: SCORE: 1918.93

## 2019-06-22 NOTE — PROGRESS NOTES
Nephrology Progress Note          Assessment and Plan:       Some improvement in pt's advanced renal failure so far.  Post-obstructive diuresis slightly less.  Still requires 1/2 NS infusion at brisk rate.  Reduce now to 250 mL/hr.  No indication for dialysis.  Continue close monitoring of chemistries and volume status.  Urine lighter color, still sl pink; no clots now.      * No resolved hospital problems. *               Interval History:   no new complaints, up and ambulating, alert, oriented to person, place and time and doing well; no cp, sob, n/v/d, or abd pain.  Feels a bit better.  Eating some.  Good oral fluid intake.  VS ok except sl high BP.  UO nearly 8L so far, 300-400 mL/hr.  Wt down >6 kg.  Meds and labs reviewed.  Lytes ok.  Cr down to 7.5.  Other chemistries ok.  CBC stable; Hgb 9.                 Medications:       sodium chloride (PF)  10 mL Intracatheter Q8H     sodium chloride (PF)  3 mL Intracatheter Q8H       NaCl 400 mL/hr at 06/22/19 0915                    Physical Exam:       Vital Sign Ranges  Temp:  [97.3  F (36.3  C)-98.3  F (36.8  C)] 97.3  F (36.3  C)  Pulse:  [90-91] 90  Heart Rate:  [] 84  Resp:  [16-19] 18  BP: (141-173)/() 141/98  SpO2:  [98 %-100 %] 99 %    Weight, current:  109.7 kg (actual weight)  Weight change:     I/O last 3 completed shifts:  In: 3997 [I.V.:3997]  Out: 5930 [Urine:6200]    Physical Exam:   General:  Patient comfortable, in no apparent distress.  Awake, alert, oriented x3.  Neck:  Supple, no JVD.  Lungs:  Clear to auscultation bilaterally.  Cardiac:  Regular rate and rhythm, no murmurs, rub, or gallops.  Abdomen:  Soft, nontender, physiologic sounds.  Extremities:  Minimal edema.  2+ pulses.  Skin:  Warm, dry.  Neurologic:  No focal deficits.             Data:        Lab Results   Component Value Date     06/22/2019    Lab Results   Component Value Date    CHLORIDE 113 (H) 06/22/2019    Lab Results   Component Value Date    BUN 82 (H)  06/22/2019      Lab Results   Component Value Date    POTASSIUM 4.4 06/22/2019    Lab Results   Component Value Date    CO2 19 (L) 06/22/2019    Lab Results   Component Value Date    CR 7.50 (H) 06/22/2019        Lab Results   Component Value Date     06/22/2019     06/21/2019     06/21/2019     Lab Results   Component Value Date    POTASSIUM 4.4 06/22/2019    POTASSIUM 4.5 06/21/2019    POTASSIUM 4.7 06/21/2019     Lab Results   Component Value Date    CHLORIDE 113 (H) 06/22/2019    CHLORIDE 112 (H) 06/21/2019    CHLORIDE 110 (H) 06/21/2019     Lab Results   Component Value Date    CO2 19 (L) 06/22/2019    CO2 18 (L) 06/21/2019    CO2 18 (L) 06/21/2019     Lab Results   Component Value Date    CR 7.50 (H) 06/22/2019    CR 8.15 (H) 06/21/2019    CR 8.94 (H) 06/21/2019     Lab Results   Component Value Date    BUN 82 (H) 06/22/2019    BUN 94 (H) 06/21/2019    BUN 96 (H) 06/21/2019     Lab Results   Component Value Date    HGB 9.0 (L) 06/22/2019    HGB 8.9 (L) 06/21/2019    HGB 8.7 (L) 06/20/2019     No results found for: PH, PHARTERIAL, PO2, [id number], SAT, PCO2, HCO3, BASEEXCESS, CASTILLO, BEB          Max Rubalcava MD  Nephrology; [facility] [employer]  [phone]

## 2019-06-22 NOTE — PROVIDER NOTIFICATION
MD Notification    Notified Person: MD    Notified Person Name: Dr. Carvalho    Notification Date/Time: 2025 6/21/19    Notification Interaction: Page    Purpose of Notification: Urine thick with clots. Wondering if needs CBI? Dr. Rubalcava asked for urology to be paged after talking with him too.    Orders Received: Hand irrigate q4 hours and PRN. Okay to upgrade to bigger size catheter if needed     Comments:

## 2019-06-22 NOTE — CONSULTS
Consult Date:  06/22/2019      CARDIOLOGY CONSULTATION       REASON FOR CONSULTATION:  Newly diagnosed cardiomyopathy.      HISTORY OF PRESENT ILLNESS:  Mr. Esparza is a 64-year-old male with a background history of hypertension and lack of health maintenance.  He arrives to [facility] after being noted by a primary care physician to have marked acute kidney injury with symptoms of bladder outlet obstruction.  He was diagnosed with lower urinary retention due to obstructive uropathy for which he required a Mayberry catheter placement for subsequent decompression.  He has had steadily improving kidney function since insertion of the Mayberry catheter and is being followed closely by Nephrology.  He is currently with postobstructive diuresis and has had a markedly robust urinary output.  As part of his evaluation, he was noted to have cardiomegaly on chest x-ray along with a markedly elevated NT-proBNP greater than 45,000.  A transthoracic echocardiogram was acquired which revealed biventricular dysfunction, prompting a Cardiology consultation.      In speaking with Mr. Esparza, he endorses the history above.  Additional history acquisition is notable for exertional intolerance to which Mr. Esparza has noted while mowing the yard.  He says that he can walk for an extended period of time on a flat surface, but he does note that his exertional tolerance is less than previous.  He does describe 1 episode approximately 1-2 months ago where he experienced severe left-sided chest discomfort with radiation to his left arm.  He notes that this episode lasted approximately 2 hours before its resolution.  He denies nausea with the episode but does describe that he was sweaty.  He denies shortness of breath as well.  He described the pain as very intense and described in as a tightness sensation.  He has not had subsequent or recent episodes following that event.  Prior to his arrival, he did also endorses  increased lower extremity swelling, but denies orthopnea and paroxysms of nocturnal dyspnea.  He denies palpitations.  He denies syncope or presyncopal events.      Vital signs reveal his blood pressure is 144/89, heart rate is 91.  He is afebrile, saturating well on room air with a normal respiratory rate of 18.      CBC reveals diminished hemoglobin at 9.0, white blood cell count 8.1, platelets are 225.      Electrolyte panel is notable for sodium of 142, potassium 4.4, bicarbonate 19, BUN 82 and creatinine 7.5, which represents a decrease from his index creatinine of 9.59.  NT-proBNP was markedly elevated upon presentation at greater than 45,000.  Magnesium is low at 1.5.  Phosphorus is elevated at 7.7.      A 12-lead ECG demonstrates sinus rhythm, left atrial enlargement with inferolateral ST segment depression.      Chest x-ray reveals cardiomegaly with increased right atrium and left atrium contours as well as left ventricular contour.  Aorta is tortuous.  There is a small left-sided pleural effusion and some subtle suggestion of pulmonary venous hypertension.        REVIEW OF SYSTEMS:  Otherwise negative except as what is already noted in the HPI.      SOCIAL HISTORY:  The patient is a former smoker, does not drink alcohol, does not use illicit drugs.      FAMILY HISTORY:  Glaucoma in his mother, throat cancer in his father, rheumatoid arthritis in his sister, alcoholism and liver disease in his brother.      PHYSICAL EXAMINATION:   GENERAL:  Unwell-appearing gentleman who appears older than stated age, friendly, sensorium clear, mildly anxious, obese, cognition intact, cooperative.   HEENT:  No major abnormalities.   NECK:  JVP is not elevated.   LUNGS:  Clear to auscultation bilaterally, diminished respiratory excursion.  Otherwise, clear lung fields.   ABDOMEN:  Soft, nontender, nondistended.  Increased adiposity.   CARDIOVASCULAR:  Normal S1, S2.  No murmurs detected.   EXTREMITIES:  Warm and well  perfused.  Mild edema.      IMPRESSION AND RECOMMENDATIONS:   1.  Cardiomyopathy, likely ischemic, new, LVEF 30%.   2.  Hypertension.   3.  Acute kidney injury secondary to obstructive uropathy, status post Mayberry placement.   4.  Postobstructive diuresis.   5.  Former smoking history.   6.  Lack of health maintenance.        Mr. Donis presents to [facility] following an episode of severe bladder outlet obstruction, now currently improving status post Mayberry catheter placement.  He is with new findings of severe left ventricular dysfunction on transthoracic echocardiogram.  Given the details of his history, I am suspicious of unrecognized coronary artery disease as an attributable cause.  Other alternative explanations should also be entertained as well.      Moving forward, I think we should look for opportunities to initiate guideline-directed medical therapy.  It is my hope that his kidney function continues to improve with Mayberry catheter placement.  Should there be an opportunity to introduce an ACE inhibitor or angiotensin receptor blocker, we will go ahead and do so; however, for the time being, we will initiate beta blockade therapy.  I think carvedilol starting at 6.25 mg b.i.d. is a reasonable place to start.  Given his robust diuresis, I do not feel compelled to initiate diuretic therapy at this time.      In terms of delineating the cause of his cardiomyopathy, I think it would be reasonable to either proceed with coronary angiogram should his renal function improve and/or entertain the possibility of a noninvasive stress test should his renal function were to not approach near normal.  I introduced the possibility of this evaluation to Mr. Donis.      Thank you for this consultation.  We will continue to follow along.         TROY GUERRERO MD             D: 06/22/2019   T: 06/22/2019   MT: MAN      Name:     MARIFER DONIS   MRN:      [id number]        Account:       [id number]    :      1955           Consult Date:  2019      Document: [id number]       cc: Krzysztof Thakur MD

## 2019-06-22 NOTE — PROGRESS NOTES
[facility]    Medicine Progress Note - Hospitalist Service       Date of Admission:  6/21/2019  Assessment & Plan      Ras Esparza is a 64 year old male who has not seen primary care physician for routine checkup for several years and so is not aware of any medical problems who was sent to emergency room by his physician because of abnormal creatinine when he presented to his primary care provider yesterday due to worsening lower extremity edema and intermittent back pain.    Acute kidney injury  Bladder outlet obstruction with bilateral hydronephrosis and hydroureter  Elevated PSA  Hematuria  -Acute kidney injury likely secondary to bladder outlet obstruction.  Since placement of Mayberry patient has had postobstructive diuresis and nephrology following, and adjusting his fluids, appreciate input  -Renal function since yesterday improved somewhat,  -Monitor daily BMP, avoid any nephrotoxin  -Patient did have some hematuria noted after Mayberry catheter assessment.  Urology following, appreciate input  He had elevated PSA, plan to repeat the PSA in about 2 months.  Per urology on digital rectal examination no evidence of prostate cancer with smooth enlarged prostate and no fixed or no nodules  -Plan to discharge patient with a Mayberry catheter to follow-up at urology clinic, continue to irrigate Mayberry for hematuria/clots    Newly diagnosed cardiomyopathy  Lower extremity edema  -Patient had presented with elevated BNP and worsening dyspnea on exertion and bilateral lower extremity swelling.Lower extremity ultrasound negative for DVT  -Echocardiogram revealed EF of 30% with global hypokinesis  -Blood pressure is also on the higher side, will start on metoprolol 50 mg twice daily  -Unable to start on ACE inhibitor secondary to renal failure  Strict I's and O's and daily weight, currently patient getting IV fluids at brisk rate to match postobstructive diuresis(see above), nephrology aware of his  cardiomyopathy    Back pain  -Could be referred pain from distended bladder, however given his elevated PSA and possibility of prostate cancer with metastasis spine x-ray was done for screening, came negative for any acute abnormality  -Patient has been asymptomatic since admission    Essential Hypertension   -Likely undiagnosed in the setting of not seeing a physician for several years.  Not on any medication PTA  -PRN IV hydralazine and labetalol  -Blood pressure over the last 24 hours continues to be elevated, will start on Toprol given his newly diagnosed cardiomyopathy, monitor and adjust medication as needed    Hypomagnesemia  -Replace and monitor    Hyperphosphatemia  -Likely secondary to his renal failure, monitor    Anemia  -Baseline unknown, presented with hemoglobin of 8-9  -Does complain of intermittent hemorrhoidal bleeding.  Also had some hematuria after placement of Mayberry catheter.  Hemoglobin has remained stable over the last 24 hours.  Monitor and transfuse as needed       Diet: Renal Diet (non-dialysis)    DVT Prophylaxis: Pneumatic Compression Devices  Mayberry Catheter: in place, indication: Obstruction;Retention;Gross Hematuria  Code Status: Full Code      Disposition Plan   Expected discharge: 2 - 3 days, recommended to prior living arrangement once renal function improved and Clinical improvement and evaluated by cardiology completed and cleared by all consultants.  Entered: Shantel Worthington MD 06/22/2019, 1:16 PM       The patient's care was discussed with the Bedside Nurse, Patient and Patient's Family.    Shantel Worthington MD  Hospitalist Service  [facility]    ______________________________________________________________________    Interval History   Patient reports feeling better today.  Denies any new complaints.  Updated him about the new finding on echocardiogram.  Questions answered.  No new nursing concerns    Data reviewed today: I reviewed all medications, new labs and  imaging results over the last 24 hours. I personally reviewed results of lower extremity ultrasound which was negative for DVT and spine x-ray which was also negative for any acute abnormality.    Physical Exam   Vital Signs: Temp: 97.3  F (36.3  C) Temp src: Oral BP: (!) 141/98 Pulse: 90 Heart Rate: 84 Resp: 18 SpO2: 99 % O2 Device: None (Room air)    Weight: 241 lbs 12.8 oz  Exam:  Constitutional: Awake, alert and no distress. Appears comfortable  Head: Normocephalic. No masses, lesions, tenderness or abnormalities  ENT: ENT exam normal, no neck nodes or sinus tenderness  Cardiovascular: RRR.  No murmurs, no rubs or JVD  Respiratory: Normal WOB,b/l equal air entry, no wheezes or crackles   Gastrointestinal: Abdomen soft, non-tender. BS normal. No masses, organomegaly  : Deferred  Extremities : 2+ bilateral edema , no clubbing or cyanosis      Data   Recent Labs   Lab 06/22/19  0730 06/21/19  2131 06/21/19  1114 06/20/19  1651   WBC 8.1  --  6.9 8.0   HGB 9.0*  --  8.9* 8.7*   MCV 94  --  93 96     --  228 227    141 142 141   POTASSIUM 4.4 4.5 4.7 5.0   CHLORIDE 113* 112* 110* 110*   CO2 19* 18* 18* 18*   BUN 82* 94* 96* 98*   CR 7.50* 8.15* 8.94* 9.59*   ANIONGAP 10 11 14 13   ELICIA 7.7* 8.3* 8.7 8.4*   GLC 95 128* 95 100*   ALBUMIN  --   --  3.3* 3.5   PROTTOTAL  --   --  7.1 7.1   BILITOTAL  --   --  0.6 0.7   ALKPHOS  --   --  80 79   ALT  --   --  38 39   AST  --   --  15 18     Recent Results (from the past 24 hour(s))   US Lower Extremity Venous Duplex Bilateral    Narrative    PROCEDURE:  Venous Doppler ultrasound of the bilateral lower extremities    DATE OF PROCEDURE:  6/21/2019 2:58 PM    CLINICAL HISTORY/INDICATION:   Evaluate for DVT, swelling in the legs    COMPARISON:   None relevant    TECHNIQUE:   Grayscale, color-flow, and spectral waveform analysis were performed  of the deep veins of the bilateral lower extremities    FINDINGS:   The right common femoral vein, superficial femoral  vein and popliteal  vein demonstrate normal compressibility, spectral waveform, color flow  and augmentation.    The left common femoral vein, superficial femoral vein and popliteal  vein demonstrate normal compressibility, spectral waveform, color flow  and augmentation.    The right posterior tibial vein, peroneal vein, and greater saphenous  vein are compressible.    The left posterior tibial vein, peroneal vein, and greater saphenous  vein are compressible      Impression    IMPRESSION:   1. No evidence of deep venous thrombosis in the right lower extremity  2. No evidence of deep venous thrombosis in the left lower extremity    LIANNA LORENZO MD   XR Thoracic Lumbar Standing 2 Views    Narrative    THORACIC LUMBAR STANDING TWO VIEWS     6/21/2019 5:12 PM     HISTORY: back pain.    COMPARISON: None.      Impression    IMPRESSION:  Standing two views of the lumbar spine are performed. No  evidence of fracture.  There is slight retrolisthesis of L1 on L2 but  the alignment is otherwise unremarkable. Multilevel degenerative disc  changes are noted throughout the lower thoracic and lumbar spine where  imaged.     TAMRA MERCADO MD     Medications     NaCl 250 mL/hr at 06/22/19 1158       magnesium oxide  400 mg Oral BID     metoprolol tartrate  50 mg Oral BID     sodium chloride (PF)  3 mL Intracatheter Q8H

## 2019-06-22 NOTE — PLAN OF CARE
"A/Ox4, pleasant. C/o minimal penile pain at a \"2\"; denied the need for any PRN meds. VSS ex BP. HTN on shift, PRN Labetalol given; effective. HR mildly tachy @ times. OVSS. Mayberry in place - red output, also clots. MEASURE Q2hrs and notify MD if UOP>1400 in 2hrs. Dr. Rubalcava called as UO >600; see note. Also called oncall urology d/t clots. MD stated to hand irrigate q4 hours and PRN. Last BM 6/21. +2-3 edema to BLE. PIV infusing 1/2NS @ 400 mL/hr. Up SBA. Will continue to monitor.   "

## 2019-06-22 NOTE — PROGRESS NOTES
Pt seen on rounds today -- see attestation note from yesterday's consult by Mariana Morgan  Nursing will irrigate Mayberry for hematuria, clots; instructed to upsize Mayberry if needed  Eventually will go home with Mayberry and follow up in 2 weeks for self cath teaching    Stan Carvalho MD  [facility]  [phone]

## 2019-06-22 NOTE — PROVIDER NOTIFICATION
MD Notification    Notified Person: MD    Notified Person Name:  Khadar     Notification Date/Time: 6/22/2019 1215    Notification Interaction: Paged    Purpose of Notification: FYI: Phos 7.7. Mag 1.5. Echo results back with EF 30%, OK with fluids at 250?    Orders Received: Replace mag - 400mg mag oxide BID. Ok with phos level. Continue fluids at 250 mL/hr.     Comments:

## 2019-06-22 NOTE — PLAN OF CARE
Patient A/Ox4, forgetful. VSS ex slightly hypertensive. Started on Coreg. Minimal pain at tip of penis. Up SBA. Mayberry with Pink tinged/tori output, irrigated q4hrs for clots. 1 small clot noted. Measuring output q2hrs. PIV with 1/2 NS at 250 mL/hr. Creat down to 7.50. Echo results back, cardiology consult placed. Phos 7.7 and mag 1.5 - MD aware, started of mag supplement and OK with phos level - rechecks in AM. Urology and nephrology following. Will continue to monitor.

## 2019-06-22 NOTE — CONSULTS
"CLINICAL NUTRITION SERVICES  -  ASSESSMENT NOTE    Malnutrition:   % Weight Loss:  Weight loss does not meet criteria for malnutrition - intentional  % Intake:  Decreased intake does not meet criteria for malnutrition   Subcutaneous Fat Loss:  None observed  Muscle Loss:  Temporal region mild depletion and Clavicle bone region mild-moderate depletion  Fluid Retention:  Moderate + (weak indicator - do not anticipate masking weight loss)     Malnutrition Diagnosis: Patient does not meet two of the above criteria necessary for diagnosing malnutrition     REASON FOR ASSESSMENT  Ras Esparza is a 64 year old male seen by Registered Dietitian for Admission Nutrition Risk Screen for unintentional loss of 10# or more in the past two months    NUTRITION HISTORY  - Information obtained from patient, EMR.   - Pt presented to PCP with lower extremity edema and intermittent back pain, sent to ED given abnormal labs.   - Patient had been eating 1 meal/day \"whatever I want\". Practicing intermittent fasting for weight loss. No unintentional weight loss indicated.   - MD notes 45-50# in the past 4-5 months.   - NKFA  - Follows a regular diet.     CURRENT NUTRITION ORDERS  Diet Order:     Renal     Current Intake/Tolerance:  Adequate lunch meal ordered      NUTRITION FOCUSED PHYSICAL ASSESSMENT (NFPA)  Completed:  Yes Visual assessment only         Observed:    Muscle wasting (refer to documentation in Malnutrition section)    Obtained from Chart/Interdisciplinary Team:  BM x1 6/21  3+ moderate edema    ANTHROPOMETRICS  Height: 5' 11\"  Weight: 241 lbs 12.8 oz (109.7 kg)   Body mass index is 33.72 kg/m .  Weight Status:  Obesity Grade I BMI 30-34.9  IBW: 78.2 kg  % IBW: 140%  Weight History: Intentional weight loss - does not meet criteria for malnutrition.   Wt Readings from Last 10 Encounters:   06/22/19 109.7 kg (241 lb 12.8 oz)   06/20/19 114.8 kg (253 lb)   10/02/17 (!) 136.5 kg (300 lb 14.4 oz)   01/10/17 (!) 137.9 kg (304 " lb)       LABS  Labs reviewed  Recent Labs   Lab Test 06/22/19  0730 06/21/19 2131 06/21/19  1114 06/20/19  1651   POTASSIUM 4.4 4.5 4.7 5.0     Recent Labs   Lab Test 06/22/19  0730   PHOS 7.7*     Recent Labs   Lab Test 06/22/19  0730   MAG 1.5*     Recent Labs   Lab Test 06/22/19  0730 06/21/19  2131 06/21/19  1114 06/20/19  1651    141 142 141     Recent Labs   Lab Test 06/22/19  0730 06/21/19 2131 06/21/19  1114 06/20/19  1651   CR 7.50* 8.15* 8.94* 9.59*     Recent Labs   Lab 06/22/19  0730 06/21/19 2131 06/21/19  1114 06/20/19  1651   GLC 95 128* 95 100*     No results found for: A1C  No results found for: TRIG       MEDICATIONS  Medications reviewed  NaCl IVF @ 250 mL/hr     ASSESSED NUTRITION NEEDS PER APPROVED PRACTICE GUIDELINES:  Dosing Weight 86 kg - adjusted   Estimated Energy Needs: 7268-9450 kcals (25-30 Kcal/Kg)  Justification: maintenance  Estimated Protein Needs: 103-129 grams protein (1.2-1.5 g pro/Kg)  Justification: preservation of lean body mass with regard to renal function   Estimated Fluid Needs: Per provider pending renal function    MALNUTRITION:  % Weight Loss:  Weight loss does not meet criteria for malnutrition - intentional  % Intake:  Decreased intake does not meet criteria for malnutrition   Subcutaneous Fat Loss:  None observed  Muscle Loss:  Temporal region mild depletion and Clavicle bone region mild-moderate depletion  Fluid Retention:  Moderate + (weak indicator - do not anticipate masking weight loss)     Malnutrition Diagnosis: Patient does not meet two of the above criteria necessary for diagnosing malnutrition    NUTRITION DIAGNOSIS:  Predicted inadequate nutrient intake related to restrictive nature of current diet order      NUTRITION INTERVENTIONS  Recommendations / Nutrition Prescription  Continue diet as ordered - Renal. Discharge diet per Nephrology.       Implementation  Nutrition education: No education needs assessed at this time --> defer discharge diet  to nephrology. If education required prior to discharge will plan to instruct at that time.       Nutrition Goals  Consumption of at least 75% meals TID.       MONITORING AND EVALUATION:  Progress towards goals will be monitored and evaluated per protocol and Practice Guidelines    Kiley Forman RD, LD

## 2019-06-22 NOTE — PROVIDER NOTIFICATION
MD Notification    Notified Person: MD    Notified Person Name: Dr. Rubalcava     Notification Date/Time: 2005 6/21/19    Notification Interaction: Page    Purpose of Notification: Urine output >600 in 2 hours. Clots in urine too.    Orders Received: Increased fluids to 400 mL/hr. Call in 2 hours again for UO.     Comments:

## 2019-06-22 NOTE — PROVIDER NOTIFICATION
MD Notification    Notified Person: MD    Notified Person Name: Dr. Rubalcava    Notification Date/Time: 2220 6/21/19    Notification Interaction: Page    Purpose of Notification: Calling back as ordered by MD    Orders Received: continue 1/2 NS @ 400 mL/hr. Page hospitalist overnight if UO >1400 mL every 2 hours.     Comments:

## 2019-06-23 LAB
ANION GAP SERPL CALCULATED.3IONS-SCNC: 11 MMOL/L (ref 3–14)
BUN SERPL-MCNC: 71 MG/DL (ref 7–30)
CALCIUM SERPL-MCNC: 8 MG/DL (ref 8.5–10.1)
CHLORIDE SERPL-SCNC: 113 MMOL/L (ref 94–109)
CO2 SERPL-SCNC: 17 MMOL/L (ref 20–32)
CREAT SERPL-MCNC: 6.43 MG/DL (ref 0.66–1.25)
GFR SERPL CREATININE-BSD FRML MDRD: 8 ML/MIN/{1.73_M2}
GLUCOSE SERPL-MCNC: 100 MG/DL (ref 70–99)
MAGNESIUM SERPL-MCNC: 1.5 MG/DL (ref 1.6–2.3)
PHOSPHATE SERPL-MCNC: 5.9 MG/DL (ref 2.5–4.5)
POTASSIUM SERPL-SCNC: 4.6 MMOL/L (ref 3.4–5.3)
PTH-INTACT SERPL-MCNC: 409 PG/ML (ref 12–64)
SODIUM SERPL-SCNC: 141 MMOL/L (ref 133–144)

## 2019-06-23 PROCEDURE — 25800029 ZZH RX IP 258 OP 250: Performed by: INTERNAL MEDICINE

## 2019-06-23 PROCEDURE — 25000132 ZZH RX MED GY IP 250 OP 250 PS 637: Performed by: INTERNAL MEDICINE

## 2019-06-23 PROCEDURE — 99232 SBSQ HOSP IP/OBS MODERATE 35: CPT | Performed by: INTERNAL MEDICINE

## 2019-06-23 PROCEDURE — 36415 COLL VENOUS BLD VENIPUNCTURE: CPT | Performed by: INTERNAL MEDICINE

## 2019-06-23 PROCEDURE — 84100 ASSAY OF PHOSPHORUS: CPT | Performed by: INTERNAL MEDICINE

## 2019-06-23 PROCEDURE — 12000000 ZZH R&B MED SURG/OB

## 2019-06-23 PROCEDURE — 80048 BASIC METABOLIC PNL TOTAL CA: CPT | Performed by: INTERNAL MEDICINE

## 2019-06-23 PROCEDURE — 83970 ASSAY OF PARATHORMONE: CPT | Performed by: INTERNAL MEDICINE

## 2019-06-23 PROCEDURE — 83735 ASSAY OF MAGNESIUM: CPT | Performed by: INTERNAL MEDICINE

## 2019-06-23 RX ORDER — HYDRALAZINE HYDROCHLORIDE 10 MG/1
10 TABLET, FILM COATED ORAL 4 TIMES DAILY
Status: DISCONTINUED | OUTPATIENT
Start: 2019-06-23 | End: 2019-06-23 | Stop reason: DRUGHIGH

## 2019-06-23 RX ORDER — ASPIRIN 81 MG/1
81 TABLET, CHEWABLE ORAL DAILY
Status: DISCONTINUED | OUTPATIENT
Start: 2019-06-23 | End: 2019-06-25 | Stop reason: HOSPADM

## 2019-06-23 RX ORDER — ISOSORBIDE MONONITRATE 30 MG/1
30 TABLET, EXTENDED RELEASE ORAL DAILY
Status: DISCONTINUED | OUTPATIENT
Start: 2019-06-23 | End: 2019-06-25 | Stop reason: HOSPADM

## 2019-06-23 RX ORDER — HYDRALAZINE HYDROCHLORIDE 25 MG/1
25 TABLET, FILM COATED ORAL 4 TIMES DAILY
Status: DISCONTINUED | OUTPATIENT
Start: 2019-06-23 | End: 2019-06-25 | Stop reason: HOSPADM

## 2019-06-23 RX ADMIN — SODIUM CHLORIDE: 4.5 INJECTION, SOLUTION INTRAVENOUS at 22:52

## 2019-06-23 RX ADMIN — SODIUM CHLORIDE: 4.5 INJECTION, SOLUTION INTRAVENOUS at 06:56

## 2019-06-23 RX ADMIN — MAGNESIUM OXIDE TAB 400 MG (241.3 MG ELEMENTAL MG) 400 MG: 400 (241.3 MG) TAB at 21:33

## 2019-06-23 RX ADMIN — CARVEDILOL 9.38 MG: 6.25 TABLET, FILM COATED ORAL at 18:23

## 2019-06-23 RX ADMIN — SODIUM CHLORIDE: 4.5 INJECTION, SOLUTION INTRAVENOUS at 02:58

## 2019-06-23 RX ADMIN — ASPIRIN 81 MG 81 MG: 81 TABLET ORAL at 14:53

## 2019-06-23 RX ADMIN — HYDRALAZINE HYDROCHLORIDE 25 MG: 25 TABLET ORAL at 18:23

## 2019-06-23 RX ADMIN — CARVEDILOL 6.25 MG: 6.25 TABLET, FILM COATED ORAL at 08:09

## 2019-06-23 RX ADMIN — ISOSORBIDE MONONITRATE 30 MG: 30 TABLET, EXTENDED RELEASE ORAL at 16:28

## 2019-06-23 RX ADMIN — SODIUM CHLORIDE: 4.5 INJECTION, SOLUTION INTRAVENOUS at 14:56

## 2019-06-23 RX ADMIN — MAGNESIUM OXIDE TAB 400 MG (241.3 MG ELEMENTAL MG) 400 MG: 400 (241.3 MG) TAB at 08:09

## 2019-06-23 RX ADMIN — HYDRALAZINE HYDROCHLORIDE 25 MG: 25 TABLET ORAL at 21:31

## 2019-06-23 ASSESSMENT — ACTIVITIES OF DAILY LIVING (ADL)
ADLS_ACUITY_SCORE: 14

## 2019-06-23 ASSESSMENT — MIFFLIN-ST. JEOR: SCORE: 1921.65

## 2019-06-23 NOTE — PLAN OF CARE
A&Ox4, forgetful @ times. VSS. No HTN on shift. C/o minimal discomfort @ tip of penile area; denied the need for anything. Mayberry with pink/slightly red tinged/tori output, irrigated q4hrs for clots. Few small clots noted. Measuring output q2hrs. PIV infusing 1/2 NS @ 250 mL/hr. Urology and nephrology following. SBA. Will continue to monitor.

## 2019-06-23 NOTE — PROGRESS NOTES
Nephrology Progress Note          Assessment and Plan:       Renal failure of unknown duration continues steady improvement after milligan placement.  Uremic sx's improving.  Post-obstructive diuresis dropping off only slightly.  UO now ~6-7 L/day.  Oral intake fine, so IV fluid reduced to 3 L/day.  Continue close monitoring of chemistries and volume status.        * No resolved hospital problems. *               Interval History:   no new complaints, up and ambulating, alert, oriented to person, place and time and doing well; no cp, sob, n/v/d, or abd pain.  Calm and comfortable.  VS ok.  BP now approaching nl.  Good intake.  Wt same as 6/22.  Meds and labs reviewed.  Lytes ok, except sl worse NAGMA, probably due to chloride loading with current IV solution.  Maybe add some NaHCO3 if worse tomorrow.  Cr down to ~6.4, and BUN also steadily lower.  Phos improved.                    Medications:       aspirin  81 mg Oral Daily     carvedilol  9.375 mg Oral BID w/meals     hydrALAZINE  25 mg Oral 4x Daily     isosorbide mononitrate  30 mg Oral Daily     magnesium oxide  400 mg Oral BID     sodium chloride (PF)  3 mL Intracatheter Q8H       NaCl 125 mL/hr at 06/23/19 1456                    Physical Exam:       Vital Sign Ranges  Temp:  [97.2  F (36.2  C)-98  F (36.7  C)] 97.2  F (36.2  C)  Heart Rate:  [72-84] 78  Resp:  [16-18] 16  BP: (116-140)/(70-96) 140/90  SpO2:  [98 %-99 %] 99 %    Weight, current:  110 kg (actual weight)  Weight change: -6.169 kg (-13 lb 9.6 oz)    I/O last 3 completed shifts:  In: 3941 [I.V.:3941]  Out: 7240 [Urine:7450]    Physical Exam:   General:  Patient comfortable, in no apparent distress.  Awake, alert, oriented x3.  Neck:  Supple, no JVD.  Lungs:  Clear to auscultation bilaterally.  Cardiac:  Regular rate and rhythm, no murmurs, rub, or gallops.  Abdomen:  Soft, nontender, physiologic sounds.  Extremities:  Without edema.  2+ pulses.  Skin:  Warm, dry.  Neurologic:  No focal  deficits.             Data:        Lab Results   Component Value Date     06/23/2019    Lab Results   Component Value Date    CHLORIDE 113 (H) 06/23/2019    Lab Results   Component Value Date    BUN 71 (H) 06/23/2019      Lab Results   Component Value Date    POTASSIUM 4.6 06/23/2019    Lab Results   Component Value Date    CO2 17 (L) 06/23/2019    Lab Results   Component Value Date    CR 6.43 (H) 06/23/2019        Lab Results   Component Value Date     06/23/2019     06/22/2019     06/21/2019     Lab Results   Component Value Date    POTASSIUM 4.6 06/23/2019    POTASSIUM 4.4 06/22/2019    POTASSIUM 4.5 06/21/2019     Lab Results   Component Value Date    CHLORIDE 113 (H) 06/23/2019    CHLORIDE 113 (H) 06/22/2019    CHLORIDE 112 (H) 06/21/2019     Lab Results   Component Value Date    CO2 17 (L) 06/23/2019    CO2 19 (L) 06/22/2019    CO2 18 (L) 06/21/2019     Lab Results   Component Value Date    CR 6.43 (H) 06/23/2019    CR 7.50 (H) 06/22/2019    CR 8.15 (H) 06/21/2019     Lab Results   Component Value Date    BUN 71 (H) 06/23/2019    BUN 82 (H) 06/22/2019    BUN 94 (H) 06/21/2019     Lab Results   Component Value Date    HGB 9.0 (L) 06/22/2019    HGB 8.9 (L) 06/21/2019    HGB 8.7 (L) 06/20/2019     No results found for: PH, PHARTERIAL, PO2, [id number], SAT, PCO2, HCO3, BASEEXCESS, CASTILLO, BEB          Max Rubalcava MD  Nephrology; [facility]  [phone]

## 2019-06-23 NOTE — PROGRESS NOTES
Case reviewed and dicussed with Dr. Navarrete. Lawrence PINZON in pt admitted for ARF due to obstructive uropathy. Ischemic eval when stable with angio if renal fxn improves, stress test if not. In the mean time start hydralazine/nitrate. Not candidate for ACEI or ARB due ARF.

## 2019-06-23 NOTE — PROGRESS NOTES
[facility]    Cardiology Progress Note     Assessment & Plan   Ras Esparza is a 64 year old male who was admitted on 6/21/2019.     1.  Cardiomyopathy, likely ischemic, new, LVEF 30%.   2.  Hypertension.   3.  Acute kidney injury secondary to obstructive uropathy, status post Mayberry placement.   4.  Postobstructive diuresis.   5.  Former smoking history.   6.  Lack of health maintenance.   - will add daily ASA  - will increase carvedilol to 9.375 BID  - will defer ACEI/ARB for now  - will avoid diuretic as it is not needed at this time  - will consider coronary angiogram once patient renal function nears his (new) baseline     Jose Navarrete MD    Interval History    UOP remains robust.  Renal function indices continue to improve.    No major overnight events.  Patient reports feeling well today.    Physical Exam   Temp: 97.2  F (36.2  C) Temp src: Oral BP: 140/90   Heart Rate: 78 Resp: 16 SpO2: 99 % O2 Device: None (Room air)    Vitals:    06/21/19 1028 06/22/19 0543 06/23/19 0557   Weight: 116.1 kg (256 lb) 109.7 kg (241 lb 12.8 oz) 110 kg (242 lb 6.4 oz)     Vital Signs with Ranges  Temp:  [97.2  F (36.2  C)-98  F (36.7  C)] 97.2  F (36.2  C)  Heart Rate:  [72-91] 78  Resp:  [16-18] 16  BP: (116-144)/(70-96) 140/90  SpO2:  [98 %-99 %] 99 %  I/O last 3 completed shifts:  In: 6911 [I.V.:6911]  Out: 7290 [Urine:7500]  Patient Active Problem List   Diagnosis     Acute kidney injury (H)     GENERAL:  Well-appearing gentleman, friendly, sensorium clear, obese, cognition intact, cooperative.   HEENT:  No major abnormalities.   LUNGS:  Clear to auscultation bilaterally, diminished respiratory excursion.  Otherwise, clear lung fields.   ABDOMEN:  Soft, nontender, nondistended.  Increased adiposity.   CARDIOVASCULAR:  Normal S1, S2.  No murmurs detected.   EXTREMITIES:  Warm and well perfused.  Mild edema.        Medications     NaCl 125 mL/hr at 06/23/19 0932       carvedilol  6.25 mg Oral BID w/meals      magnesium oxide  400 mg Oral BID     sodium chloride (PF)  3 mL Intracatheter Q8H       Data   Results for orders placed or performed during the hospital encounter of 06/21/19 (from the past 24 hour(s))   Basic metabolic panel   Result Value Ref Range    Sodium 141 133 - 144 mmol/L    Potassium 4.6 3.4 - 5.3 mmol/L    Chloride 113 (H) 94 - 109 mmol/L    Carbon Dioxide 17 (L) 20 - 32 mmol/L    Anion Gap 11 3 - 14 mmol/L    Glucose 100 (H) 70 - 99 mg/dL    Urea Nitrogen 71 (H) 7 - 30 mg/dL    Creatinine 6.43 (H) 0.66 - 1.25 mg/dL    GFR Estimate 8 (L) >60 mL/min/[1.73_m2]    GFR Estimate If Black 10 (L) >60 mL/min/[1.73_m2]    Calcium 8.0 (L) 8.5 - 10.1 mg/dL   Magnesium   Result Value Ref Range    Magnesium 1.5 (L) 1.6 - 2.3 mg/dL   Phosphorus   Result Value Ref Range    Phosphorus 5.9 (H) 2.5 - 4.5 mg/dL   Parathyroid Hormone Intact   Result Value Ref Range    Parathyroid Hormone Intact 409 (H) 12 - 64 pg/mL     Recent Labs   Lab 06/23/19  0649 06/22/19  0730 06/21/19  2131 06/21/19  1114 06/20/19  1651   WBC  --  8.1  --  6.9 8.0   HGB  --  9.0*  --  8.9* 8.7*   MCV  --  94  --  93 96   PLT  --  225  --  228 227    142 141 142 141   POTASSIUM 4.6 4.4 4.5 4.7 5.0   CHLORIDE 113* 113* 112* 110* 110*   CO2 17* 19* 18* 18* 18*   BUN 71* 82* 94* 96* 98*   CR 6.43* 7.50* 8.15* 8.94* 9.59*   ANIONGAP 11 10 11 14 13   ELICIA 8.0* 7.7* 8.3* 8.7 8.4*   * 95 128* 95 100*   ALBUMIN  --   --   --  3.3* 3.5   PROTTOTAL  --   --   --  7.1 7.1   BILITOTAL  --   --   --  0.6 0.7   ALKPHOS  --   --   --  80 79   ALT  --   --   --  38 39   AST  --   --   --  15 18     No results found for this or any previous visit (from the past 24 hour(s)).

## 2019-06-23 NOTE — PROGRESS NOTES
[facility]    Hospitalist Progress Note    Interval History   - Feels well today, denies any complaints. Plans to take a shower. Doesn't like the food here. Ambulating a little.    Assessment & Plan   Summary: Ras Esparza is a 64 year old male with no significant PMH who was admitted on 6/21/2019 with acute kidney injury secondary to bladder outlet obstruction. Also found to have a new cardiomyopathy, likely ischemic.    Acute kidney injury  Bladder outlet obstruction with bilateral hydronephrosis and hydroureter  Elevated PSA  Hematuria  Acute kidney injury likely secondary to bladder outlet obstruction. Since placement of Milligan patient has had postobstructive diuresis and nephrology following, and adjusting his fluids, appreciate input. He had elevated PSA.  Per urology on digital rectal examination no evidence of prostate cancer with smooth enlarged prostate and no fixed or no nodules  - Daily BMP  - Continue milligan  - Nephrology following, appreciate input   - 1/2 NS @ 125mL to counter post-obstructive diuresis  - Urology following, appreciate input   - Repeat PSA in around 2 months   - Plan discharge with milligan catheter     Newly diagnosed cardiomyopathy, likely ischemic  Lower extremity edema  Presented with elevated BNP over 45k and worsening dyspnea on exertion and bilateral lower extremity swelling. Reports a single episode of typical chest pain, diaphoresis, lasting two hours approximately -1-2 months ago and onset of lower extremity edema following this. Lower extremity ultrasound negative for DVT. Echocardiogram revealed EF of 30% with global hypokinesis.  - Cardiology consulted, appreciate recs   - Coreg   - Outpatient angiogram given acute renal failure?   - Unable to start on ACE inhibitor secondary to renal failure    Back pain: Spine x-ray did not reveal any bony lesions or abnormality. Patient no longer having back pain.    Essential hypertension:  - Continue  Coreg    Hypomagnesemia: Replace and monitor  Hyperphosphatemia: ikely secondary to his renal failure, monitor    Anemia: Baseline unknown, presented with hemoglobin of 8-9. Does complain of intermittent hemorrhoidal bleeding.  Also had some hematuria after placement of Mayberry catheter.  - Check iron labs in AM  - Likely colonoscopy as outpatient    DVT Prophylaxis: Pneumatic Compression Devices  Code Status: Full Code  PT/OT: not needed    Disposition: Expected discharge pending further improvement in bladder function, cardiac workup    Eloy Ceron MD  Text Page  (7am to 6pm)  -Data reviewed today: I reviewed all new labs and imaging results over the last 24 hours.    Physical Exam   Temp: 97.2  F (36.2  C) Temp src: Oral BP: 140/90   Heart Rate: 78 Resp: 16 SpO2: 99 % O2 Device: None (Room air)    Vitals:    06/21/19 1028 06/22/19 0543 06/23/19 0557   Weight: 116.1 kg (256 lb) 109.7 kg (241 lb 12.8 oz) 110 kg (242 lb 6.4 oz)     Vital Signs with Ranges  Temp:  [97.2  F (36.2  C)-98  F (36.7  C)] 97.2  F (36.2  C)  Heart Rate:  [72-91] 78  Resp:  [16-18] 16  BP: (116-144)/(70-96) 140/90  SpO2:  [98 %-99 %] 99 %  I/O last 3 completed shifts:  In: 6911 [I.V.:6911]  Out: 7290 [Urine:7500]  O2 requirements: none    Constitutional: Male in NAD  HEENT: Eyes nonicteric, oral mucosa moist  Cardiovascular: RRR, normal S1/2, no m/r/g  Respiratory: CTAB, no wheezing or crackles  Vascular: 2+ BLE pitting edema, noted distal pedal pulses  GI: Normoactive bowel sounds, nontender, nondistended  Skin/Integumen: No rashes  Neuro/Psych: Appropriate affect and mood. A&Ox3, moves all extremities    Medications     NaCl 125 mL/hr at 06/23/19 0821       carvedilol  6.25 mg Oral BID w/meals     magnesium oxide  400 mg Oral BID     sodium chloride (PF)  3 mL Intracatheter Q8H       Data   Recent Labs   Lab 06/23/19  0649 06/22/19  0730 06/21/19  2131 06/21/19  1114 06/20/19  1651   WBC  --  8.1  --  6.9 8.0   HGB  --  9.0*  --   8.9* 8.7*   MCV  --  94  --  93 96   PLT  --  225  --  228 227    142 141 142 141   POTASSIUM 4.6 4.4 4.5 4.7 5.0   CHLORIDE 113* 113* 112* 110* 110*   CO2 17* 19* 18* 18* 18*   BUN 71* 82* 94* 96* 98*   CR 6.43* 7.50* 8.15* 8.94* 9.59*   ANIONGAP 11 10 11 14 13   ELICIA 8.0* 7.7* 8.3* 8.7 8.4*   * 95 128* 95 100*   ALBUMIN  --   --   --  3.3* 3.5   PROTTOTAL  --   --   --  7.1 7.1   BILITOTAL  --   --   --  0.6 0.7   ALKPHOS  --   --   --  80 79   ALT  --   --   --  38 39   AST  --   --   --  15 18       Imaging:   No results found for this or any previous visit (from the past 24 hour(s)).

## 2019-06-23 NOTE — PLAN OF CARE
A/Ox4, forgetful. VSS, denies pain. Up SBA. Tolerating renal diet. Mayberry with good UOP. Slightly pink tinged/tori output. PIV with 1/2 NS @ 125. Cardiology increased Coreg dose and started patient on aspirin, Isosorbide, and hydralazine. Urology and nephrology following. Will continue to monitor.

## 2019-06-24 LAB
ANION GAP SERPL CALCULATED.3IONS-SCNC: 10 MMOL/L (ref 3–14)
BUN SERPL-MCNC: 63 MG/DL (ref 7–30)
CALCIUM SERPL-MCNC: 7.9 MG/DL (ref 8.5–10.1)
CHLORIDE SERPL-SCNC: 114 MMOL/L (ref 94–109)
CO2 SERPL-SCNC: 18 MMOL/L (ref 20–32)
CREAT SERPL-MCNC: 5.82 MG/DL (ref 0.66–1.25)
FERRITIN SERPL-MCNC: 205 NG/ML (ref 26–388)
GFR SERPL CREATININE-BSD FRML MDRD: 10 ML/MIN/{1.73_M2}
GLUCOSE BLDC GLUCOMTR-MCNC: 101 MG/DL (ref 70–99)
GLUCOSE SERPL-MCNC: 92 MG/DL (ref 70–99)
HBV SURFACE AB SERPL IA-ACNC: 0.06 M[IU]/ML
HBV SURFACE AG SERPL QL IA: NONREACTIVE
IRON SATN MFR SERPL: 15 % (ref 15–46)
IRON SERPL-MCNC: 33 UG/DL (ref 35–180)
MAGNESIUM SERPL-MCNC: 1.6 MG/DL (ref 1.6–2.3)
PHOSPHATE SERPL-MCNC: 5.2 MG/DL (ref 2.5–4.5)
POTASSIUM SERPL-SCNC: 4.4 MMOL/L (ref 3.4–5.3)
SODIUM SERPL-SCNC: 142 MMOL/L (ref 133–144)
TIBC SERPL-MCNC: 224 UG/DL (ref 240–430)
TRANSFERRIN SERPL-MCNC: 182 MG/DL (ref 210–360)

## 2019-06-24 PROCEDURE — 12000000 ZZH R&B MED SURG/OB

## 2019-06-24 PROCEDURE — 80048 BASIC METABOLIC PNL TOTAL CA: CPT | Performed by: INTERNAL MEDICINE

## 2019-06-24 PROCEDURE — 00000146 ZZHCL STATISTIC GLUCOSE BY METER IP

## 2019-06-24 PROCEDURE — 25000132 ZZH RX MED GY IP 250 OP 250 PS 637: Performed by: INTERNAL MEDICINE

## 2019-06-24 PROCEDURE — 82728 ASSAY OF FERRITIN: CPT | Performed by: INTERNAL MEDICINE

## 2019-06-24 PROCEDURE — 84100 ASSAY OF PHOSPHORUS: CPT | Performed by: INTERNAL MEDICINE

## 2019-06-24 PROCEDURE — 83550 IRON BINDING TEST: CPT | Performed by: INTERNAL MEDICINE

## 2019-06-24 PROCEDURE — 25000132 ZZH RX MED GY IP 250 OP 250 PS 637: Performed by: PHYSICIAN ASSISTANT

## 2019-06-24 PROCEDURE — 99232 SBSQ HOSP IP/OBS MODERATE 35: CPT | Performed by: INTERNAL MEDICINE

## 2019-06-24 PROCEDURE — 83540 ASSAY OF IRON: CPT | Performed by: INTERNAL MEDICINE

## 2019-06-24 PROCEDURE — 99233 SBSQ HOSP IP/OBS HIGH 50: CPT | Performed by: INTERNAL MEDICINE

## 2019-06-24 PROCEDURE — 25800029 ZZH RX IP 258 OP 250: Performed by: INTERNAL MEDICINE

## 2019-06-24 PROCEDURE — 36415 COLL VENOUS BLD VENIPUNCTURE: CPT | Performed by: INTERNAL MEDICINE

## 2019-06-24 PROCEDURE — 83735 ASSAY OF MAGNESIUM: CPT | Performed by: INTERNAL MEDICINE

## 2019-06-24 PROCEDURE — 84466 ASSAY OF TRANSFERRIN: CPT | Performed by: INTERNAL MEDICINE

## 2019-06-24 RX ORDER — CARVEDILOL 12.5 MG/1
12.5 TABLET ORAL 2 TIMES DAILY WITH MEALS
Status: DISCONTINUED | OUTPATIENT
Start: 2019-06-24 | End: 2019-06-25 | Stop reason: HOSPADM

## 2019-06-24 RX ORDER — ATORVASTATIN CALCIUM 40 MG/1
40 TABLET, FILM COATED ORAL EVERY EVENING
Status: DISCONTINUED | OUTPATIENT
Start: 2019-06-24 | End: 2019-06-25 | Stop reason: HOSPADM

## 2019-06-24 RX ADMIN — SODIUM CHLORIDE: 4.5 INJECTION, SOLUTION INTRAVENOUS at 06:14

## 2019-06-24 RX ADMIN — HYDRALAZINE HYDROCHLORIDE 25 MG: 25 TABLET ORAL at 08:19

## 2019-06-24 RX ADMIN — CARVEDILOL 9.38 MG: 6.25 TABLET, FILM COATED ORAL at 08:19

## 2019-06-24 RX ADMIN — HYDRALAZINE HYDROCHLORIDE 25 MG: 25 TABLET ORAL at 14:25

## 2019-06-24 RX ADMIN — SODIUM CHLORIDE: 4.5 INJECTION, SOLUTION INTRAVENOUS at 14:25

## 2019-06-24 RX ADMIN — ISOSORBIDE MONONITRATE 30 MG: 30 TABLET, EXTENDED RELEASE ORAL at 08:18

## 2019-06-24 RX ADMIN — CARVEDILOL 12.5 MG: 12.5 TABLET, FILM COATED ORAL at 17:39

## 2019-06-24 RX ADMIN — HYDRALAZINE HYDROCHLORIDE 25 MG: 25 TABLET ORAL at 22:00

## 2019-06-24 RX ADMIN — ASPIRIN 81 MG 81 MG: 81 TABLET ORAL at 08:19

## 2019-06-24 RX ADMIN — MAGNESIUM OXIDE TAB 400 MG (241.3 MG ELEMENTAL MG) 400 MG: 400 (241.3 MG) TAB at 20:13

## 2019-06-24 RX ADMIN — MAGNESIUM OXIDE TAB 400 MG (241.3 MG ELEMENTAL MG) 400 MG: 400 (241.3 MG) TAB at 08:19

## 2019-06-24 RX ADMIN — HYDRALAZINE HYDROCHLORIDE 25 MG: 25 TABLET ORAL at 17:37

## 2019-06-24 ASSESSMENT — ACTIVITIES OF DAILY LIVING (ADL)
ADLS_ACUITY_SCORE: 14

## 2019-06-24 ASSESSMENT — MIFFLIN-ST. JEOR: SCORE: 1907.13

## 2019-06-24 NOTE — PLAN OF CARE
A&Ox4, pleasant. C/o minimal pain @ penile area; no need for PRN's. VSS. BP's a lot better overnight. SBA. Last BM this AM. Mayberry in place draining well. No clots noted when irrigating. Mayberry color pink/tori on shift. PIV infusing 1/2 NS @ 125 mL/hr. Urology, nephrology and cardiology following. BLE edema, +2-3. Right > left. Discharge pending. Continue to monitor.

## 2019-06-24 NOTE — PROGRESS NOTES
[facility]    Nephrology Progress Note     Assessment & Plan     1) Baseline Kidney Function: unknown.    2) Kidney Failure of unknown duration:  Due to bladder outlet obstruction.  Slowly better S/P milligan.    3) Post Obstructive Diuresis:  Still present but perhaps better.  He is anxious for discharge.  The key to discharge is proving that he no longer needs IV fluid.      Plan:    Stop IV fluid  Push oral fluids  Nutrition Services Consult re:  Diet for CKD.    Anival Sellers MD  [facility] - Nephrology  [phone]    Interval History     Feeling much better.  Pt denies f/c/n/v.  No cp/sob/cough.  No dysuria/hematuria/abd or flank pain.  No dizziness, lightheadedness, headaches, or focal neuro sx.    Really wants to go home.  Has financial concerns about staying in hospital.  He is still on IVF to combat post obstructive diuresis.      Physical Exam   Temp: 97.5  F (36.4  C) Temp src: Oral BP: 111/69 Pulse: 66 Heart Rate: 71 Resp: 16 SpO2: 100 % O2 Device: None (Room air)    Vitals:    06/22/19 0543 06/23/19 0557 06/24/19 0645   Weight: 109.7 kg (241 lb 12.8 oz) 110 kg (242 lb 6.4 oz) 108.5 kg (239 lb 3.2 oz)     Vital Signs with Ranges  Temp:  [96.4  F (35.8  C)-97.8  F (36.6  C)] 97.5  F (36.4  C)  Pulse:  [66-69] 66  Heart Rate:  [69-74] 71  Resp:  [16-18] 16  BP: (109-135)/(61-92) 111/69  SpO2:  [97 %-100 %] 100 %  I/O last 3 completed shifts:  In: 4843 [P.O.:960; I.V.:3883]  Out: 5480 [Urine:5600]    GENERAL APPEARANCE: pleasant, NAD, a & o  HEENT:  Eyes/ears/nose/neck grossly normal  RESP: lungs cta b c good efforts, no crackles, rhonchi or wheezes  CV: RRR, nl S1/S2, no m/r/g   ABDOMEN: o/s/nt/nd, bs present  EXTREMITIES/SKIN: no rashes/lesions; tr-1+ BLE pretibial edema    Medications       aspirin  81 mg Oral Daily     atorvastatin  40 mg Oral QPM     carvedilol  12.5 mg Oral BID w/meals     hydrALAZINE  25 mg Oral 4x Daily     isosorbide mononitrate  30 mg Oral Daily      magnesium oxide  400 mg Oral BID     sodium chloride (PF)  3 mL Intracatheter Q8H       Data   BMP  Recent Labs   Lab 06/24/19  0722 06/23/19  0649 06/22/19  0730 06/21/19  2131    141 142 141   POTASSIUM 4.4 4.6 4.4 4.5   CHLORIDE 114* 113* 113* 112*   ELICIA 7.9* 8.0* 7.7* 8.3*   CO2 18* 17* 19* 18*   BUN 63* 71* 82* 94*   CR 5.82* 6.43* 7.50* 8.15*   GLC 92 100* 95 128*     [email or web address](phos:4)  CBC)  Recent Labs   Lab 06/22/19  0730 06/21/19  1114 06/20/19  1651   WBC 8.1 6.9 8.0   HGB 9.0* 8.9* 8.7*   HCT 27.7* 27.7* 27.9*   MCV 94 93 96    228 227     Recent Labs   Lab 06/21/19  1114   AST 15   ALT 38   ALKPHOS 80   BILITOTAL 0.6     No lab results found in last 7 days.  No results found for: D2VIT, D3VIT, DTOT  Recent Labs   Lab 06/24/19  0722 06/22/19  0730   HGB  --  9.0*   HCT  --  27.7*   MCV  --  94   IRON 33*  --    IRONSAT 15  --    *  --    KANWAL 205  --      Recent Labs   Lab 06/23/19  0649   PTHI 409*       Attestation:   I have reviewed today's relevant vital signs, notes, medications, labs and imaging.

## 2019-06-24 NOTE — PROGRESS NOTES
[facility]    Hospitalist Progress Note    Interval History   - No acute complaints today. Continues to diurese.    Assessment & Plan   Summary: Ras Esparza is a 64 year old male with no significant PMH who was admitted on 6/21/2019 with acute kidney injury secondary to bladder outlet obstruction. Also found to have a new cardiomyopathy, likely ischemic.    Acute kidney injury  Bladder outlet obstruction with bilateral hydronephrosis and hydroureter  Elevated PSA  Hematuria  Acute kidney injury likely secondary to bladder outlet obstruction. Since placement of Milligan patient has had postobstructive diuresis and nephrology following, and adjusting his fluids, appreciate input. He had elevated PSA.  Per urology on digital rectal examination no evidence of prostate cancer with smooth enlarged prostate and no fixed or no nodules  - Daily BMP  - Nephrology following, appreciate input   - 1/2 NS @ 125mL to counter post-obstructive diuresis  - Urology following, appreciate input   - Repeat PSA in around 2 months   - Plan discharge with milligan catheter, f/uy      Newly diagnosed cardiomyopathy, likely ischemic  Lower extremity edema  Presented with elevated BNP over 45k and worsening dyspnea on exertion and bilateral lower extremity swelling. Reports a single episode of typical chest pain, diaphoresis, lasting two hours approximately -1-2 months ago and onset of lower extremity edema following this. Lower extremity ultrasound negative for DVT. Echocardiogram revealed EF of 30% with global hypokinesis.  - Cardiology consulted, appreciate recs   - Coreg   - Outpatient angiogram given acute renal failure   - Unable to start on ACE inhibitor secondary to renal failure      Essential hypertension: Continue Coreg, per cardiology    Hypomagnesemia: Replace and monitor  Hyperphosphatemia: ikely secondary to his renal failure, monitor  Back pain: Spine x-ray did not reveal any bony lesions or abnormality. Patient  no longer having back pain.  Anemia: Baseline unknown, presented with hemoglobin of 8-9. Does complain of intermittent hemorrhoidal bleeding.  Also had some hematuria after placement of Mayberry catheter. Iron labs does not show iron deficiency.  - Likely colonoscopy as outpatient    DVT Prophylaxis: Pneumatic Compression Devices  Code Status: Full Code  PT/OT: not needed    Disposition: Expected discharge pending further improvement in bladder function    Eloy Ceron MD  Text Page  (7am to 6pm)  -Data reviewed today: I reviewed all new labs and imaging results over the last 24 hours.    Physical Exam   Temp: 97.5  F (36.4  C) Temp src: Oral BP: 111/69 Pulse: 66 Heart Rate: 71 Resp: 16 SpO2: 100 % O2 Device: None (Room air)    Vitals:    06/22/19 0543 06/23/19 0557 06/24/19 0645   Weight: 109.7 kg (241 lb 12.8 oz) 110 kg (242 lb 6.4 oz) 108.5 kg (239 lb 3.2 oz)     Vital Signs with Ranges  Temp:  [96.4  F (35.8  C)-97.8  F (36.6  C)] 97.5  F (36.4  C)  Pulse:  [66-69] 66  Heart Rate:  [69-74] 71  Resp:  [16-18] 16  BP: (109-135)/(61-92) 111/69  SpO2:  [97 %-100 %] 100 %  I/O last 3 completed shifts:  In: 2883 [I.V.:2883]  Out: 5580 [Urine:5700]  O2 requirements: none    Constitutional: Male in NAD  HEENT: Eyes nonicteric, oral mucosa moist  Cardiovascular: RRR, normal S1/2, no m/r/g  Respiratory: CTAB, no wheezing or crackles  Vascular: 2+ BLE pitting edema, noted distal pedal pulses  GI: Normoactive bowel sounds, nontender, nondistended  Skin/Integumen: No rashes  Neuro/Psych: Appropriate affect and mood. A&Ox3, moves all extremities    Medications     NaCl 125 mL/hr at 06/24/19 0614       aspirin  81 mg Oral Daily     carvedilol  12.5 mg Oral BID w/meals     hydrALAZINE  25 mg Oral 4x Daily     isosorbide mononitrate  30 mg Oral Daily     magnesium oxide  400 mg Oral BID     sodium chloride (PF)  3 mL Intracatheter Q8H       Data   Recent Labs   Lab 06/24/19  0722 06/23/19  0649 06/22/19  0765   06/21/19  1114 06/20/19  1651   WBC  --   --  8.1  --  6.9 8.0   HGB  --   --  9.0*  --  8.9* 8.7*   MCV  --   --  94  --  93 96   PLT  --   --  225  --  228 227    141 142   < > 142 141   POTASSIUM 4.4 4.6 4.4   < > 4.7 5.0   CHLORIDE 114* 113* 113*   < > 110* 110*   CO2 18* 17* 19*   < > 18* 18*   BUN 63* 71* 82*   < > 96* 98*   CR 5.82* 6.43* 7.50*   < > 8.94* 9.59*   ANIONGAP 10 11 10   < > 14 13   ELICIA 7.9* 8.0* 7.7*   < > 8.7 8.4*   GLC 92 100* 95   < > 95 100*   ALBUMIN  --   --   --   --  3.3* 3.5   PROTTOTAL  --   --   --   --  7.1 7.1   BILITOTAL  --   --   --   --  0.6 0.7   ALKPHOS  --   --   --   --  80 79   ALT  --   --   --   --  38 39   AST  --   --   --   --  15 18    < > = values in this interval not displayed.       Imaging:   No results found for this or any previous visit (from the past 24 hour(s)).

## 2019-06-24 NOTE — PLAN OF CARE
"7a-3p shift  Admitted with acute renal failure due to obstructive uropathy.  Creatinine very high at 5.82 but is trending down. Ca+ 7.9, Phos high at 5.2 nephrologist currently at bedside and aware of abnormal labs.  Milligan draining clear yellow urine with couple tiny  blood clots. Hand irrigated x2 this shift per schedule. Output quantity good. Denies pain except  C/o minimal pain @ penile area; no need for PRN's. VSS. BP improved since cardiology reportedly started coreg, hydralazine, and isosorbide yesterday.  PIV infusing 1/2 NS @ 125 mL/hr. Urology, nephrology and cardiology following. BLE edema, +2-3.   Pt given written \"Caring for your Indwelling Catheter at Home booklet\" as well as Wendy education sheets on the 3 BP/heart meds mentioned above.  Pt's wife to arrive this annalee and next shift nurse was asked to teach both the proper technique of switching from leg bag to bedside bag.  Anticipate discharge home (with milligan in place) when OK with nephrologist.  Continue to monitor  "

## 2019-06-24 NOTE — PROGRESS NOTES
[facility]  Cardiology Progress Note    Date of Service (when I saw the patient): 06/24/2019  Summary: Ras Esparza is a 64 year old male who has not sought routine but has a known history of hypertension who was admitted on 6/21/2019 from clinic with acute renal failure secondary to obstructive uropathy. He was also found to have a new cardiomyopathy. Of note, 1 - 2 months ago he had an episode of severe chest discomfort which he did not seek evaluation for.   Interval History   No complaints this morning. Denies chest discomfort or shortness of breath.   Assessment & Plan   1.  Cardiomyopathy, likely ischemic. Echo shows LVEF of 30% with severe global hypokinesis. The LV is moderately dilated. RV appeared normal. Concern for ischemic etiology given his history. Risk factors include hypertension and prior tobacco use.   -  Medical therapy started with carvedilol and nitrates/hydralazine given his acute renal failure.   -  Needs ischemic evaluation with preferably coronary angiography if renal function improves vs stress test if he has persistent renal dysfunction.   2.  Hypertension. Not on medications prior to admission.   3.  Acute kidney injury secondary to obstructive uropathy, status post Milligan placement. Creatinine was > 9 on admission. This has continued to steadily improved after milligan placement (5 today). Nephrology and urology following.  4.  Postobstructive diuresis.   5.  Former smoking history.    Plan:  1.  Continue current cardiac medications. Will increase carvedilol from 9.375 to 12.5 mg twice daily. Continue hydralazine and imdur.  2.  Will revisit ischemic work up once renal function has improved. This could be done as an outpatient as well.     Selina Mckeon PA-C    Physical Exam   Temp: 96.4  F (35.8  C) Temp src: Oral BP: 117/74 Pulse: 69 Heart Rate: 71 Resp: 16 SpO2: 100 % O2 Device: None (Room air)    Vitals:    06/21/19 1028 06/22/19 0543 06/23/19 0557 06/24/19 0645    Weight: 116.1 kg (256 lb) 109.7 kg (241 lb 12.8 oz) 110 kg (242 lb 6.4 oz) 108.5 kg (239 lb 3.2 oz)     Vital Signs with Ranges  Temp:  [96.4  F (35.8  C)-97.8  F (36.6  C)] 96.4  F (35.8  C)  Pulse:  [69] 69  Heart Rate:  [69-74] 71  Resp:  [16-18] 16  BP: (109-135)/(61-92) 117/74  SpO2:  [97 %-100 %] 100 %  06/19 0700 - 06/24 0659  In: 01774 [I.V.:30220]  Out: 28621 [Urine:56800]  Net: -5000  Constitutional: NAD.   Respiratory: breath sounds are CTAB.  Cardiovascular: RRR, s1s2, no murmur  GI: soft, BS+  Skin: warm, no rashes  Musculoskeletal: Moving all extremities. No edema.  : milligan in place  Neurologic: Alert, oriented x 3  Neuropsychiatric: Normal affect   Data   Results for orders placed or performed during the hospital encounter of 06/21/19 (from the past 24 hour(s))   Basic metabolic panel   Result Value Ref Range    Sodium 142 133 - 144 mmol/L    Potassium 4.4 3.4 - 5.3 mmol/L    Chloride 114 (H) 94 - 109 mmol/L    Carbon Dioxide 18 (L) 20 - 32 mmol/L    Anion Gap 10 3 - 14 mmol/L    Glucose 92 70 - 99 mg/dL    Urea Nitrogen 63 (H) 7 - 30 mg/dL    Creatinine 5.82 (H) 0.66 - 1.25 mg/dL    GFR Estimate 10 (L) >60 mL/min/[1.73_m2]    GFR Estimate If Black 11 (L) >60 mL/min/[1.73_m2]    Calcium 7.9 (L) 8.5 - 10.1 mg/dL   Iron and iron binding capacity   Result Value Ref Range    Iron 33 (L) 35 - 180 ug/dL    Iron Binding Cap 224 (L) 240 - 430 ug/dL    Iron Saturation Index 15 15 - 46 %   Ferritin   Result Value Ref Range    Ferritin 205 26 - 388 ng/mL   Phosphorus   Result Value Ref Range    Phosphorus 5.2 (H) 2.5 - 4.5 mg/dL   Magnesium   Result Value Ref Range    Magnesium 1.6 1.6 - 2.3 mg/dL       Echocardiogram 6/22/19:  Interpretation Summary     The left ventricle is moderately dilated.  There is mild concentric left ventricular hypertrophy.  LVEF 30% based on biplane 2D tracing.  Diastolic Doppler findings (E/E' ratio and/or other parameters) suggest left  ventricular filling pressures are  increased.  There is severe global hypokinesia of the left ventricle.  There is no thrombus seen in the left ventricle.  No significant valvular heart disease.    Medications     NaCl 125 mL/hr at 06/24/19 0614       aspirin  81 mg Oral Daily     carvedilol  9.375 mg Oral BID w/meals     hydrALAZINE  25 mg Oral 4x Daily     isosorbide mononitrate  30 mg Oral Daily     magnesium oxide  400 mg Oral BID     sodium chloride (PF)  3 mL Intracatheter Q8H   .

## 2019-06-24 NOTE — PLAN OF CARE
Plan of care unchanged. Irrigate milligan PRN for clots. Awaiting arrival of pts spouse for srini modi. Pt IVF stopped in order to see if creat trends down without IVF. Encouraging PO intake. CMS intact. Should discharge tomorrow with srini.

## 2019-06-24 NOTE — PROGRESS NOTES
[facility]    Urology Progress Note     Assessment & Plan  Ras Esparza is a 64 year old male with urinary retention and ARF with >3L drained upon milligan placement. Had subsequent hematuria and post obstructive diuresis but urine is now clear and creat is improving.     Plan:    Irrigate catheter PRN for clots    Continue milligan for now and upon discharge. RN to provide leg bag and teach milligan cares.    Will follow up with Urology in 2 weeks to learn how to straight catheterize (AVS updated)    Prosper Colón PA-C 6/24/2019 9:53 AM  [facility]  Mon-Fri, 7am - 4pm  Office: [phone]      Interval History   Urine is clear today. Has occasional bladder pain that goes away quickly.    Physical Exam   Temp: 96.4  F (35.8  C) Temp src: Oral BP: 117/74 Pulse: 69 Heart Rate: 71 Resp: 16 SpO2: 100 % O2 Device: None (Room air)    Vitals:    06/22/19 0543 06/23/19 0557 06/24/19 0645   Weight: 109.7 kg (241 lb 12.8 oz) 110 kg (242 lb 6.4 oz) 108.5 kg (239 lb 3.2 oz)     Vital Signs with Ranges  Temp:  [96.4  F (35.8  C)-97.8  F (36.6  C)] 96.4  F (35.8  C)  Pulse:  [69] 69  Heart Rate:  [69-74] 71  Resp:  [16-18] 16  BP: (109-135)/(61-92) 117/74  SpO2:  [97 %-100 %] 100 %  I/O last 3 completed shifts:  In: 2883 [I.V.:2883]  Out: 5580 [Urine:5700]    General: Patient awake, alert, NAD  Head: Normocephalic, atraumatic  Eyes: No icterus  Neck: Symmetric  Respiratory: Breathing unlabored  Cardiac: Skin well-perfused  GI: Soft, NT, non-distended, no SP tenderness, no CVA tenderness  Genitourinary: Milligan in place draining clear urine, No penoscrotal edema   Extremities: Moderate bilat LE edema, no calf pain  Neurologic: No focal deficits  Neuropsychiatric: A&O x 3, responding appropriately      Medications     NaCl 125 mL/hr at 06/24/19 0614       aspirin  81 mg Oral Daily     carvedilol  9.375 mg Oral BID w/meals     hydrALAZINE  25 mg Oral 4x Daily     isosorbide mononitrate  30 mg Oral Daily      magnesium oxide  400 mg Oral BID     sodium chloride (PF)  3 mL Intracatheter Q8H       Data   Results for orders placed or performed during the hospital encounter of 06/21/19 (from the past 24 hour(s))   Basic metabolic panel   Result Value Ref Range    Sodium 142 133 - 144 mmol/L    Potassium 4.4 3.4 - 5.3 mmol/L    Chloride 114 (H) 94 - 109 mmol/L    Carbon Dioxide 18 (L) 20 - 32 mmol/L    Anion Gap 10 3 - 14 mmol/L    Glucose 92 70 - 99 mg/dL    Urea Nitrogen 63 (H) 7 - 30 mg/dL    Creatinine 5.82 (H) 0.66 - 1.25 mg/dL    GFR Estimate 10 (L) >60 mL/min/[1.73_m2]    GFR Estimate If Black 11 (L) >60 mL/min/[1.73_m2]    Calcium 7.9 (L) 8.5 - 10.1 mg/dL   Iron and iron binding capacity   Result Value Ref Range    Iron 33 (L) 35 - 180 ug/dL    Iron Binding Cap 224 (L) 240 - 430 ug/dL    Iron Saturation Index 15 15 - 46 %   Ferritin   Result Value Ref Range    Ferritin 205 26 - 388 ng/mL   Phosphorus   Result Value Ref Range    Phosphorus 5.2 (H) 2.5 - 4.5 mg/dL   Magnesium   Result Value Ref Range    Magnesium 1.6 1.6 - 2.3 mg/dL

## 2019-06-25 VITALS
WEIGHT: 237.6 LBS | TEMPERATURE: 95.5 F | OXYGEN SATURATION: 100 % | HEART RATE: 74 BPM | SYSTOLIC BLOOD PRESSURE: 112 MMHG | RESPIRATION RATE: 16 BRPM | HEIGHT: 71 IN | DIASTOLIC BLOOD PRESSURE: 74 MMHG | BODY MASS INDEX: 33.26 KG/M2

## 2019-06-25 LAB
ANION GAP SERPL CALCULATED.3IONS-SCNC: 9 MMOL/L (ref 3–14)
BUN SERPL-MCNC: 56 MG/DL (ref 7–30)
CALCIUM SERPL-MCNC: 8.3 MG/DL (ref 8.5–10.1)
CHLORIDE SERPL-SCNC: 110 MMOL/L (ref 94–109)
CO2 SERPL-SCNC: 19 MMOL/L (ref 20–32)
CREAT SERPL-MCNC: 4.98 MG/DL (ref 0.66–1.25)
GFR SERPL CREATININE-BSD FRML MDRD: 11 ML/MIN/{1.73_M2}
GLUCOSE SERPL-MCNC: 99 MG/DL (ref 70–99)
POTASSIUM SERPL-SCNC: 4.5 MMOL/L (ref 3.4–5.3)
SODIUM SERPL-SCNC: 138 MMOL/L (ref 133–144)

## 2019-06-25 PROCEDURE — 36415 COLL VENOUS BLD VENIPUNCTURE: CPT | Performed by: INTERNAL MEDICINE

## 2019-06-25 PROCEDURE — 25000132 ZZH RX MED GY IP 250 OP 250 PS 637: Performed by: INTERNAL MEDICINE

## 2019-06-25 PROCEDURE — 99238 HOSP IP/OBS DSCHRG MGMT 30/<: CPT | Performed by: INTERNAL MEDICINE

## 2019-06-25 PROCEDURE — 80048 BASIC METABOLIC PNL TOTAL CA: CPT | Performed by: INTERNAL MEDICINE

## 2019-06-25 PROCEDURE — 25000132 ZZH RX MED GY IP 250 OP 250 PS 637: Performed by: PHYSICIAN ASSISTANT

## 2019-06-25 RX ORDER — ATORVASTATIN CALCIUM 40 MG/1
40 TABLET, FILM COATED ORAL EVERY EVENING
Qty: 30 TABLET | Refills: 1 | Status: SHIPPED | OUTPATIENT
Start: 2019-06-25 | End: 2019-07-11

## 2019-06-25 RX ORDER — MAGNESIUM OXIDE 400 MG/1
400 TABLET ORAL 2 TIMES DAILY
Qty: 60 TABLET | Refills: 0 | Status: SHIPPED | OUTPATIENT
Start: 2019-06-25 | End: 2019-06-25

## 2019-06-25 RX ORDER — ISOSORBIDE MONONITRATE 30 MG/1
30 TABLET, EXTENDED RELEASE ORAL DAILY
Qty: 30 TABLET | Refills: 1 | Status: SHIPPED | OUTPATIENT
Start: 2019-06-26 | End: 2019-07-11

## 2019-06-25 RX ORDER — ACETAMINOPHEN 325 MG/1
650 TABLET ORAL EVERY 4 HOURS PRN
Qty: 60 TABLET | Refills: 0 | Status: SHIPPED | OUTPATIENT
Start: 2019-06-25 | End: 2019-06-25

## 2019-06-25 RX ORDER — ACETAMINOPHEN 325 MG/1
650 TABLET ORAL EVERY 4 HOURS PRN
Qty: 60 TABLET | Refills: 0 | Status: ON HOLD | OUTPATIENT
Start: 2019-06-25 | End: 2019-11-21

## 2019-06-25 RX ORDER — ISOSORBIDE MONONITRATE 30 MG/1
30 TABLET, EXTENDED RELEASE ORAL DAILY
Qty: 30 TABLET | Refills: 1 | Status: SHIPPED | OUTPATIENT
Start: 2019-06-26 | End: 2019-06-25

## 2019-06-25 RX ORDER — MAGNESIUM OXIDE 400 MG/1
400 TABLET ORAL 2 TIMES DAILY
Qty: 60 TABLET | Refills: 0 | Status: SHIPPED | OUTPATIENT
Start: 2019-06-25

## 2019-06-25 RX ORDER — ATORVASTATIN CALCIUM 40 MG/1
40 TABLET, FILM COATED ORAL EVERY EVENING
Qty: 30 TABLET | Refills: 1 | Status: SHIPPED | OUTPATIENT
Start: 2019-06-25 | End: 2019-06-25

## 2019-06-25 RX ORDER — CARVEDILOL 12.5 MG/1
12.5 TABLET ORAL 2 TIMES DAILY WITH MEALS
Qty: 60 TABLET | Refills: 1 | Status: SHIPPED | OUTPATIENT
Start: 2019-06-25 | End: 2019-06-25

## 2019-06-25 RX ORDER — ASPIRIN 81 MG/1
81 TABLET, CHEWABLE ORAL DAILY
Qty: 30 TABLET | Refills: 11 | Status: SHIPPED | OUTPATIENT
Start: 2019-06-26 | End: 2019-06-25

## 2019-06-25 RX ORDER — CARVEDILOL 12.5 MG/1
12.5 TABLET ORAL 2 TIMES DAILY WITH MEALS
Qty: 60 TABLET | Refills: 1 | Status: SHIPPED | OUTPATIENT
Start: 2019-06-25 | End: 2019-07-11

## 2019-06-25 RX ORDER — ASPIRIN 81 MG/1
81 TABLET, CHEWABLE ORAL DAILY
Qty: 30 TABLET | Refills: 11 | Status: ON HOLD | OUTPATIENT
Start: 2019-06-26 | End: 2019-11-22

## 2019-06-25 RX ADMIN — MAGNESIUM OXIDE TAB 400 MG (241.3 MG ELEMENTAL MG) 400 MG: 400 (241.3 MG) TAB at 09:08

## 2019-06-25 RX ADMIN — HYDRALAZINE HYDROCHLORIDE 25 MG: 25 TABLET ORAL at 09:08

## 2019-06-25 RX ADMIN — ISOSORBIDE MONONITRATE 30 MG: 30 TABLET, EXTENDED RELEASE ORAL at 09:08

## 2019-06-25 RX ADMIN — ASPIRIN 81 MG 81 MG: 81 TABLET ORAL at 09:08

## 2019-06-25 RX ADMIN — HYDRALAZINE HYDROCHLORIDE 25 MG: 25 TABLET ORAL at 13:00

## 2019-06-25 RX ADMIN — CARVEDILOL 12.5 MG: 12.5 TABLET, FILM COATED ORAL at 09:08

## 2019-06-25 ASSESSMENT — ACTIVITIES OF DAILY LIVING (ADL)
ADLS_ACUITY_SCORE: 14
ADLS_ACUITY_SCORE: 10
ADLS_ACUITY_SCORE: 14

## 2019-06-25 ASSESSMENT — MIFFLIN-ST. JEOR: SCORE: 1899.88

## 2019-06-25 NOTE — PLAN OF CARE
A&OX4. All discharge instructions and meds reviewed with pt. Pt and wife verbalized understanding. Pt stable at time of discharge. All belongings returned to pt.

## 2019-06-25 NOTE — CONSULTS
"NUTRITION EDUCATION    REASON FOR ASSESSMENT:  Renal diet for CKD     NUTRITION HISTORY:  Information obtained from patient     He states that he has already gone on the [employer] website to look at the diet - I've been here a few days you know\".  He is not a real big meat eater anymore (has appealed less to him with age), does not drink much milk but likes cheese and ice cream, likes a lot of fruits and vegetables, eats a fair amount of Italian foods with tomato sauce, does drink some soda, and likes chocolate.     CURRENT DIET:  Renal diet     NUTRITION DIAGNOSIS:  Food- and nutrition-related knowledge deficit R/t no previous formal education on diet AEB above history       INTERVENTIONS:    Nutrition Prescription:  Renal diet (moderate protein, 2 gram K, low Phos, 2 gram Na)    Implementation:    Nutrition Education (Content):  a) Provided handouts on above  b) Discussed above restrictions      Nutrition Education (Application):  a) Discussed current eating habits and recommended alternative food choices    Anticipate good compliance    Diet Education - refer to Education Flowsheet    Goals:    Patient verbalizes understanding of diet by asking appropriate questions --> \"I just need to do this\"    All of the above goals met during the education session    Follow Up/Monitoring:    Provided RD contact information for future questions.    Recommend Out-Patient Nutrition Referral, if further diet instructions are needed.    Ambreen Reyez, RD, LD, CNSC   Clinical Dietitian - [facility]             "

## 2019-06-25 NOTE — PROGRESS NOTES
[facility]    Nephrology Progress Note     Assessment & Plan     1) Baseline Kidney Function: unknown.     2) Kidney Failure of unknown duration:  Due to bladder outlet obstruction.  Slowly better S/P milligan.     3) Post Obstructive Diuresis:  Still present but he seems to be holding his own on oral hydration.       Plan:     OK for discharge.  He should have renal panel and magnesium on Friday.  I have arranged a follow up appt at our office with Mikala Dowd NP, Tuesday July 2nd 3:30PM at [facility], [address]. [address], [phone].        Anival Sellers MD  [facility] - Nephrology  [phone]    Interval History      Feels well.  He has been up walking in halls.  Appetite.OK.  He has met with dietician.    He has had instruction in milligan care.  Cr down again.    Physical Exam   Temp: 95.5  F (35.3  C) Temp src: Oral BP: 112/74 Pulse: 74 Heart Rate: 67 Resp: 16 SpO2: 100 % O2 Device: None (Room air)    Vitals:    06/23/19 0557 06/24/19 0645 06/25/19 0533   Weight: 110 kg (242 lb 6.4 oz) 108.5 kg (239 lb 3.2 oz) 107.8 kg (237 lb 9.6 oz)     Vital Signs with Ranges  Temp:  [95.5  F (35.3  C)-97.4  F (36.3  C)] 95.5  F (35.3  C)  Pulse:  [74] 74  Heart Rate:  [67-72] 67  Resp:  [16-17] 16  BP: (110-135)/(73-83) 112/74  SpO2:  [92 %-100 %] 100 %  I/O last 3 completed shifts:  In: 2680 [P.O.:1680; I.V.:1000]  Out: 6575 [Urine:6575]    GENERAL APPEARANCE: pleasant, NAD, a & o  HEENT:  Eyes/ears/nose/neck grossly normal  RESP: lungs cta b c good efforts, no crackles, rhonchi or wheezes  CV: RRR, nl S1/S2, no m/r/g   ABDOMEN: o/s/nt/nd, bs present  EXTREMITIES/SKIN: no rashes/lesions; tr BLE edema    Medications       aspirin  81 mg Oral Daily     atorvastatin  40 mg Oral QPM     carvedilol  12.5 mg Oral BID w/meals     hydrALAZINE  25 mg Oral 4x Daily     isosorbide mononitrate  30 mg Oral Daily     magnesium oxide  400 mg Oral BID     sodium chloride (PF)  3 mL  Intracatheter Q8H       Data   BMP  Recent Labs   Lab 06/25/19  0731 06/24/19  0722 06/23/19  0649 06/22/19  0730    142 141 142   POTASSIUM 4.5 4.4 4.6 4.4   CHLORIDE 110* 114* 113* 113*   ELICIA 8.3* 7.9* 8.0* 7.7*   CO2 19* 18* 17* 19*   BUN 56* 63* 71* 82*   CR 4.98* 5.82* 6.43* 7.50*   GLC 99 92 100* 95     Phos@[email or web address](phos:4)  CBC)  Recent Labs   Lab 06/22/19  0730 06/21/19  1114 06/20/19  1651   WBC 8.1 6.9 8.0   HGB 9.0* 8.9* 8.7*   HCT 27.7* 27.7* 27.9*   MCV 94 93 96    228 227     Recent Labs   Lab 06/21/19  1114   AST 15   ALT 38   ALKPHOS 80   BILITOTAL 0.6     No lab results found in last 7 days.  No results found for: D2VIT, D3VIT, DTOT  Recent Labs   Lab 06/24/19  0722 06/22/19  0730   HGB  --  9.0*   HCT  --  27.7*   MCV  --  94   IRON 33*  --    IRONSAT 15  --    *  --    KANWAL 205  --      Recent Labs   Lab 06/23/19  0649   PTHI 409*       Attestation:   I have reviewed today's relevant vital signs, notes, medications, labs and imaging.

## 2019-06-25 NOTE — DISCHARGE INSTRUCTIONS
"1. Read the attached education sheets for additional discharge instructions:  Isosorbide, Hydralazine, Milligan catheter care, Discharge instructions for Indwelling Urinary Catheter.  2. OK to wear leg drainaige bag during day as long as bag stays below bladder. MUST wear the bedside drainiage bag during night when in bed.   2. Call MD if having poor output via milligan or if urine becomes red or if urine has increased blood clots present in tubing  3. Call MD if having temp >100.4  4. Follow the RENAL diet guidelines as directed in your handouts given to you by the [facility] dietician      You have follow up appt at Nephrology office with Mikala Dowd NP, Tuesday July 2nd 3:30PM at [facility], [address], [phone].     Must make appointment at [facility] [place],for this Friday June 28th 10;00 am to have your blood drawn for the following: Renal panel and Magnesium. Tell [facility] staff to send the results to Nephrologist, Dr. Anival Sellers's \"In Box\" in Epic      "

## 2019-06-25 NOTE — DISCHARGE SUMMARY
[facility]    Hospitalist Discharge Summary       Date of Admission:  6/21/2019  Date of Discharge:  6/25/2019  Discharging Provider: Eloy Ceron MD      Discharge Diagnoses   Acute kidney injury 2/2 bladder outlet obstruction with bilateral hydronephrosis and hydroureter  Elevated PSA  Hematuria, improved  Newly diagnosed cardiomyopathy, likely ischemic  Lower extremity edema  Essential hypertension  Normocytic anemia    Follow-ups Needed After Discharge   Follow-up Appointments     Follow-up and recommended labs and tests       Please follow up with Dr. Carvalho in two weeks to learn self-catheter   techniques.    [facility]  [address] [place] [address]. [phone]  You may call [phone] with any questions or concerns.   Central Appointment #: [phone]         Follow-up and recommended labs and tests       Follow up with primary care provider, Krzysztof Thakur, within 7 days for   hospital follow- up.  Follow up BMP in 3 days (6/28/2019)  - Follow up with nephrology, urology, and cardiology as scheduled.           Hospital Course   Ras Esparza is a 64 year old male with no significant PMH who was admitted on 6/21/2019 with acute kidney injury secondary to bladder outlet obstruction, with initial creatinine 9.59. Urology and Nephrology consulted to manage his obstruction and renal failure. Since placement of Milligan patient has had postobstructive diuresis with urine output approx 5-7L per day. PSA elevated. Per urology on digital rectal examination no evidence of prostate cancer with smooth enlarged prostate and no fixed or no nodules. Patient taken off of IVF on 6/24 and was able to maintain PO intake in the presence of continued significant diuresis.  - Repeat PSA in around 2 months per urology  - Plan discharge with milligan catheter, f/u with urology     Newly diagnosed cardiomyopathy, likely ischemic  Lower extremity edema  Presented with  elevated BNP over 45k and worsening dyspnea on exertion and bilateral lower extremity swelling. Reports a single episode of typical chest pain, diaphoresis, lasting two hours approximately -1-2 months ago and onset of lower extremity edema following this. Lower extremity ultrasound negative for DVT. Echocardiogram revealed EF of 30% with global hypokinesis.  - Outpatient angiogram on hold given acute renal failure  - Unable to start on ACE inhibitor secondary to renal failure    Essential hypertension: Coreg started this admission, SBP < 140s with this    Anemia: Baseline unknown, presented with hemoglobin of 8-9. Does complain of intermittent hemorrhoidal bleeding.  Also had some hematuria after placement of Mayberry catheter. Iron labs does not show iron deficiency.  - Recommend colonoscopy as outpatient, defer to PCP regarding timing    Consultations This Hospital Stay   UROLOGY IP CONSULT  NEPHROLOGY IP CONSULT  UROLOGY IP CONSULT  CARDIOLOGY IP CONSULT  NUTRITION SERVICES ADULT IP CONSULT    Code Status   Full Code    Time Spent on this Encounter   I, Eloy Ceron, personally saw the patient today and spent approximately 25 minutes discharging this patient.       Eloy Ceron MD  [facility]  ______________________________________________________________________    Physical Exam   Vital Signs: Temp: 96.4  F (35.8  C) Temp src: Oral BP: 135/83 Pulse: 74 Heart Rate: 67 Resp: 16 SpO2: 100 % O2 Device: None (Room air)    Weight: 237 lbs 9.6 oz    Constitutional: Male in NAD  HEENT: Eyes nonicteric, oral mucosa moist  Cardiovascular: RRR, normal S1/2, no m/r/g  Respiratory: CTAB, no wheezing or crackles  Vascular: 2+ BLE pitting edema, mild upper extremity edema  GI: Normoactive bowel sounds, nontender, nondistended  Skin/Integumen: No rashes  Neuro/Psych: Appropriate affect and mood. A&Ox3, moves all extremities       Primary Care Physician   Krzysztof Thakur    Discharge Disposition   Discharged  to home  Condition at discharge: Stable    Significant Results and Procedures   Most Recent 3 CBC's:  Recent Labs   Lab Test 06/22/19  0730 06/21/19  1114 06/20/19  1651   WBC 8.1 6.9 8.0   HGB 9.0* 8.9* 8.7*   MCV 94 93 96    228 227     Most Recent 3 BMP's:  Recent Labs   Lab Test 06/25/19  0731 06/24/19  0722 06/23/19  0649    142 141   POTASSIUM 4.5 4.4 4.6   CHLORIDE 110* 114* 113*   CO2 19* 18* 17*   BUN 56* 63* 71*   CR 4.98* 5.82* 6.43*   ANIONGAP 9 10 11   ELICIA 8.3* 7.9* 8.0*   GLC 99 92 100*     Most Recent 2 LFT's:  Recent Labs   Lab Test 06/21/19  1114 06/20/19  1651   AST 15 18   ALT 38 39   ALKPHOS 80 79   BILITOTAL 0.6 0.7     Most Recent 3 Troponin's:No lab results found.,   Results for orders placed or performed during the hospital encounter of 06/21/19   Abd/pelvis CT no contrast - Stone Protocol    Narrative    CT ABDOMEN AND PELVIS WITHOUT CONTRAST 6/21/2019 12:06 PM    HISTORY: Elevated creatinine. Evaluate for mass or urinary retention.    TECHNIQUE: Helical axial scans from the dome of liver through the  pubic symphysis without contrast. Radiation dose for this scan was  reduced using automated exposure control, adjustment of the mA and/or  kV according to patient size, or iterative reconstruction technique.    COMPARISON: None.    FINDINGS: Small left pleural effusion with adjacent atelectatic  change. The liver, spleen, pancreas and bilateral adrenal glands  appear normal to the extent visualized without IV contrast. Vascular  calcifications are seen.    There is bilateral hydronephrosis and hydroureter. No urinary tract  calculi. Bilateral perinephric soft tissue stranding which is greater  on the left than the right and is likely related to chronic  obstruction. The bowel and mesentery in the upper abdomen are  unremarkable.    Scans through the pelvis show a Mayberry catheter in a decompressed  urinary bladder. Urinary bladder wall appears thickened which may be  related to  chronic bladder outlet obstruction. No free fluid or acute  appearing abnormality. The appendix is identified and appears normal.      Impression    IMPRESSION:  1. Bilateral hydronephrosis and hydroureter with no urinary tract  calculi. This is likely chronic and related to bladder outlet  obstruction.  2. Thick-walled decompressed urinary bladder with Mayberry catheter.  3. Vascular calcifications.    SUJIT DHILLON MD   XR Chest 2 Views    Narrative    CHEST TWO VIEWS  6/21/2019 1:21 PM     HISTORY: RAMON (dyspnea on exertion).    COMPARISON: None.      Impression    IMPRESSION: Posterior small left pleural effusion. Remainder of the  lungs are clear bilaterally. Heart size is probably normal considering  AP technique.    SUJIT DHILLON MD   US Lower Extremity Venous Duplex Bilateral    Narrative    PROCEDURE:  Venous Doppler ultrasound of the bilateral lower extremities    DATE OF PROCEDURE:  6/21/2019 2:58 PM    CLINICAL HISTORY/INDICATION:   Evaluate for DVT, swelling in the legs    COMPARISON:   None relevant    TECHNIQUE:   Grayscale, color-flow, and spectral waveform analysis were performed  of the deep veins of the bilateral lower extremities    FINDINGS:   The right common femoral vein, superficial femoral vein and popliteal  vein demonstrate normal compressibility, spectral waveform, color flow  and augmentation.    The left common femoral vein, superficial femoral vein and popliteal  vein demonstrate normal compressibility, spectral waveform, color flow  and augmentation.    The right posterior tibial vein, peroneal vein, and greater saphenous  vein are compressible.    The left posterior tibial vein, peroneal vein, and greater saphenous  vein are compressible      Impression    IMPRESSION:   1. No evidence of deep venous thrombosis in the right lower extremity  2. No evidence of deep venous thrombosis in the left lower extremity    LIANNA LORENZO MD   XR Thoracic Lumbar Standing 2 Views    Narrative     THORACIC LUMBAR STANDING TWO VIEWS     6/21/2019 5:12 PM     HISTORY: back pain.    COMPARISON: None.      Impression    IMPRESSION:  Standing two views of the lumbar spine are performed. No  evidence of fracture.  There is slight retrolisthesis of L1 on L2 but  the alignment is otherwise unremarkable. Multilevel degenerative disc  changes are noted throughout the lower thoracic and lumbar spine where  imaged.     TAMRA MERCADO MD       Discharge Orders      Basic metabolic panel     Discharge Order: F/U with Cardiac  [facility]      Follow-up and recommended labs and tests     Please follow up with Dr. Carvalho in two weeks to learn self-catheter techniques.    [facility]  [address] [place] [address]. [phone]  You may call [phone] with any questions or concerns.   Central Appointment #: [phone]     Reason for your hospital stay    You were hospitalized for kidney failure due to obstruction, and kidney failure.     Follow-up and recommended labs and tests     Follow up with primary care provider, Krzyzstof Thakur, within 7 days for hospital follow- up.  Follow up BMP in 3 days (6/28/2019)  - Follow up with nephrology, urology, and cardiology as scheduled.     Activity    Your activity upon discharge: activity as tolerated     Tubes and drains    You are going home with the following tubes or drains: milligan catheter.  Tube cares per hospital or home care instructions     Full Code     Diet    Follow this diet upon discharge: Orders Placed This Encounter      Renal Diet (low potassium, low salt < 2g daily)     Discharge Medications   Current Discharge Medication List      START taking these medications    Details   acetaminophen (TYLENOL) 325 MG tablet Take 2 tablets (650 mg) by mouth every 4 hours as needed for mild pain  Qty: 60 tablet, Refills: 0    Associated Diagnoses: Urinary retention      aspirin (ASA) 81 MG chewable tablet Take 1 tablet (81 mg) by mouth daily  Qty: 30  tablet, Refills: 11    Associated Diagnoses: Cardiomyopathy, unspecified type (H)      atorvastatin (LIPITOR) 40 MG tablet Take 1 tablet (40 mg) by mouth every evening  Qty: 30 tablet, Refills: 1    Associated Diagnoses: Cardiomyopathy, unspecified type (H)      carvedilol (COREG) 12.5 MG tablet Take 1 tablet (12.5 mg) by mouth 2 times daily (with meals)  Qty: 60 tablet, Refills: 1    Associated Diagnoses: Cardiomyopathy, unspecified type (H)      isosorbide mononitrate (IMDUR) 30 MG 24 hr tablet Take 1 tablet (30 mg) by mouth daily  Qty: 30 tablet, Refills: 1    Associated Diagnoses: Cardiomyopathy, unspecified type (H)      magnesium oxide (MAG-OX) 400 MG tablet Take 1 tablet (400 mg) by mouth 2 times daily  Qty: 60 tablet, Refills: 0    Associated Diagnoses: Urinary retention         STOP taking these medications       ibuprofen (ADVIL/MOTRIN) 200 MG tablet Comments:   Reason for Stopping:             Allergies   No Known Allergies

## 2019-06-25 NOTE — PLAN OF CARE
A&Ox4. VSS on RA. Denies pain. Mayberry clear with good UOP. Renal diet, encouraging fluids. Creat trending down. PIV SL. SBA. Slept in between cares,

## 2019-06-25 NOTE — PLAN OF CARE
7a-3p shift  Admitted with acute renal failure due to obstructive uropathy. Creatinine at 4.98 and continues to  trending down. Ca+ improved to 8.3 Milligan draining clear yellow urine - no  blood clots noted today. Output quantity good. Denies pain except  VSS. Drinks fluids and has been off IV fluids since yesterday. Mild bilateral lower extem edema.  Writer spent approx 40 minutes with pt and wife teaching them milligan catheter cares for at home including care of the bag, positioning of the urinary leg and bedside bags respect to bladder location. Also performed demo'd to pt and wife how to switch between bedside drainage bag and leg bags. Wife perform return demo very well. Anticipate discharge home later today (with milligan in place)after seen by nephrologist. Pt will f/u with cardiology post discharge

## 2019-06-26 ENCOUNTER — TELEPHONE (OUTPATIENT)
Dept: CARDIOLOGY | Facility: CLINIC | Age: 64
End: 2019-06-26

## 2019-06-26 NOTE — TELEPHONE ENCOUNTER
Patient was evaluated by cardiology while inpatient for new CM (likely ischemic.. Coronary angio not able to be completed d/t renal fxn... Consider as outpatient). Called patient and left  to discuss any post hospital d/c questions he may have, review medication changes, and confirm f/u appts. RN advised patient in  that we would like him to schedule a f/u apt with [facility] within the next 7-10 days (direct phone number for scheduling left in ). Patient advised in  to call clinic with any cardiac related questions or concerns.             6/24/19 cardiology progress note  My key history or physical exam findings: feels well, denies any chest pain or sob, orthopnea, ambulated without issues I discussed about prior episode of chest pain (as mentioned in prior note) but he denies feeling any chest discomfort at rest or with physical activity in past. He is very worried about hospital bills and tells me that he would like to minimize stay in hospital due to financial reasons. Recent events reviewed. CAD RF of tobacco abuse. Echo shows LVEF 30% with diffuse hypokinesia, no RWMA, EKG no q waves, non specific TWI. Cr better but still 5+, vascular calcifications seen on ct abdomen.  Lungs cta, CVS- s1s2 normal, no m/r/g, JVP appears normal     Key management decisions made by me:   Continue coreg, hydralazine, imdur, not on ace-I/arb/spironolactone due to ARF  Discussed with patient that etiology of Cardiomyopathy is not known at this time. CAD and ICM cannot be excluded. However given still GFR of 10 he is not a candidate for coronary angiogram. I recommend continuing medical management for cardiomyopathy with BB, hydralazine/nitrates combination. Agree with asa. Also recommend adding lipitor 40 mg q daily given vascular calcifications on CT abdomen. Recommend primary team to check fasting lipid panel. Once renal functions improve can re visit the issue of coronary angiogram. Recommend complete tobacco cessation.

## 2019-06-28 DIAGNOSIS — R33.9 URINARY RETENTION: ICD-10-CM

## 2019-06-28 DIAGNOSIS — N17.9 ACUTE KIDNEY INJURY (H): ICD-10-CM

## 2019-06-28 LAB
ANION GAP SERPL CALCULATED.3IONS-SCNC: 8 MMOL/L (ref 3–14)
BUN SERPL-MCNC: 45 MG/DL (ref 7–30)
CALCIUM SERPL-MCNC: 8.2 MG/DL (ref 8.5–10.1)
CHLORIDE SERPL-SCNC: 111 MMOL/L (ref 94–109)
CO2 SERPL-SCNC: 20 MMOL/L (ref 20–32)
CREAT SERPL-MCNC: 4.73 MG/DL (ref 0.66–1.25)
GFR SERPL CREATININE-BSD FRML MDRD: 12 ML/MIN/{1.73_M2}
GLUCOSE SERPL-MCNC: 101 MG/DL (ref 70–99)
MAGNESIUM SERPL-MCNC: 2 MG/DL (ref 1.6–2.3)
POTASSIUM SERPL-SCNC: 4.3 MMOL/L (ref 3.4–5.3)
SODIUM SERPL-SCNC: 139 MMOL/L (ref 133–144)

## 2019-06-28 PROCEDURE — 36415 COLL VENOUS BLD VENIPUNCTURE: CPT | Performed by: INTERNAL MEDICINE

## 2019-06-28 PROCEDURE — 83735 ASSAY OF MAGNESIUM: CPT | Performed by: INTERNAL MEDICINE

## 2019-06-28 PROCEDURE — 80048 BASIC METABOLIC PNL TOTAL CA: CPT | Performed by: INTERNAL MEDICINE

## 2019-07-01 ENCOUNTER — OFFICE VISIT (OUTPATIENT)
Dept: INTERNAL MEDICINE | Facility: CLINIC | Age: 64
End: 2019-07-01
Payer: COMMERCIAL

## 2019-07-01 VITALS
DIASTOLIC BLOOD PRESSURE: 74 MMHG | WEIGHT: 224 LBS | OXYGEN SATURATION: 99 % | BODY MASS INDEX: 31.24 KG/M2 | RESPIRATION RATE: 16 BRPM | TEMPERATURE: 98.2 F | SYSTOLIC BLOOD PRESSURE: 124 MMHG | HEART RATE: 68 BPM

## 2019-07-01 DIAGNOSIS — N32.0 BLADDER OUTFLOW OBSTRUCTION: ICD-10-CM

## 2019-07-01 DIAGNOSIS — I42.9 CARDIOMYOPATHY, UNSPECIFIED TYPE (H): ICD-10-CM

## 2019-07-01 DIAGNOSIS — D64.9 ANEMIA, UNSPECIFIED TYPE: Primary | ICD-10-CM

## 2019-07-01 DIAGNOSIS — N18.4 CKD (CHRONIC KIDNEY DISEASE) STAGE 4, GFR 15-29 ML/MIN (H): ICD-10-CM

## 2019-07-01 LAB
HGB BLD-MCNC: 9.5 G/DL (ref 13.3–17.7)
IRON SATN MFR SERPL: 8 % (ref 15–46)
IRON SERPL-MCNC: 19 UG/DL (ref 35–180)
RETICS # AUTO: 60 10E9/L (ref 25–95)
RETICS/RBC NFR AUTO: 1.8 % (ref 0.5–2)
TIBC SERPL-MCNC: 245 UG/DL (ref 240–430)

## 2019-07-01 PROCEDURE — 83540 ASSAY OF IRON: CPT | Performed by: INTERNAL MEDICINE

## 2019-07-01 PROCEDURE — 85018 HEMOGLOBIN: CPT | Performed by: INTERNAL MEDICINE

## 2019-07-01 PROCEDURE — 83550 IRON BINDING TEST: CPT | Performed by: INTERNAL MEDICINE

## 2019-07-01 PROCEDURE — 85045 AUTOMATED RETICULOCYTE COUNT: CPT | Performed by: INTERNAL MEDICINE

## 2019-07-01 PROCEDURE — 99214 OFFICE O/P EST MOD 30 MIN: CPT | Performed by: INTERNAL MEDICINE

## 2019-07-01 PROCEDURE — 36415 COLL VENOUS BLD VENIPUNCTURE: CPT | Performed by: INTERNAL MEDICINE

## 2019-07-01 PROCEDURE — 82306 VITAMIN D 25 HYDROXY: CPT | Performed by: INTERNAL MEDICINE

## 2019-07-01 NOTE — PROGRESS NOTES
Subjective     Ras Esparza is a 64 year old male who presents to clinic today for the following health issues:    [facility]       Hospital Follow-up Visit:    Hospital/Nursing Home/IP Rehab Facility: [facility]  Date of Admission: 06/21/2019  Date of Discharge: 06/25/2019  Reason(s) for Admission: Acute Kidney Injury            Problems taking medications regularly:  None       Medication changes since discharge: None       Problems adhering to non-medication therapy:  None    Summary of hospitalization:  [facility] discharge summary reviewed  Diagnostic Tests/Treatments reviewed.  Follow up needed: labs  Other Healthcare Providers Involved in Patient s Care:         Cardiology, urology, nephrology  Update since discharge: improved.     Post Discharge Medication Reconciliation: discharge medications reconciled, continue medications without change.  Plan of care communicated with patient     Coding guidelines for this visit:  Type of Medical   Decision Making Face-to-Face Visit       within 7 Days of discharge Face-to-Face Visit        within 14 days of discharge   Moderate Complexity [id number]   High Complexity [id number]          Discharge summary reviewed. Part of the summary as below:      [facility]     Hospitalist Discharge Summary       Date of Admission:  6/21/2019  Date of Discharge:  6/25/2019  Discharging Provider: Eloy Ceron MD           Discharge Diagnoses     Acute kidney injury 2/2 bladder outlet obstruction with bilateral hydronephrosis and hydroureter  Elevated PSA  Hematuria, improved  Newly diagnosed cardiomyopathy, likely ischemic  Lower extremity edema  Essential hypertension  Normocytic anemia        Follow-ups Needed After Discharge     Follow-up Appointments     Follow-up and recommended labs and tests       Please follow up with Dr. Carvalho in two weeks to learn self-catheter   techniques.     [facility]  [address] [place]. [address]  You may call [phone] with any questions or concerns.   Central Appointment #: [phone]         Follow-up and recommended labs and tests       Follow up with primary care provider, Krzysztof Thakur, within 7 days for   hospital follow- up.  Follow up BMP in 3 days (6/28/2019)  - Follow up with nephrology, urology, and cardiology as scheduled.              Hospital Course     Ras Esparza is a 64 year old male with no significant PMH who was admitted on 6/21/2019 with acute kidney injury secondary to bladder outlet obstruction, with initial creatinine 9.59. Urology and Nephrology consulted to manage his obstruction and renal failure. Since placement of Milligan patient has had postobstructive diuresis with urine output approx 5-7L per day. PSA elevated. Per urology on digital rectal examination no evidence of prostate cancer with smooth enlarged prostate and no fixed or no nodules. Patient taken off of IVF on 6/24 and was able to maintain PO intake in the presence of continued significant diuresis.  - Repeat PSA in around 2 months per urology  - Plan discharge with milligan catheter, f/u with urology                  Newly diagnosed cardiomyopathy, likely ischemic  Lower extremity edema  Presented with elevated BNP over 45k and worsening dyspnea on exertion and bilateral lower extremity swelling. Reports a single episode of typical chest pain, diaphoresis, lasting two hours approximately -1-2 months ago and onset of lower extremity edema following this. Lower extremity ultrasound negative for DVT. Echocardiogram revealed EF of 30% with global hypokinesis.  - Outpatient angiogram on hold given acute renal failure  - Unable to start on ACE inhibitor secondary to renal failure     Essential hypertension: Coreg started this admission, SBP < 140s with this     Anemia: Baseline unknown, presented with hemoglobin of 8-9. Does complain of intermittent hemorrhoidal  "bleeding.  Also had some hematuria after placement of Mayberry catheter. Iron labs does not show iron deficiency.  - Recommend colonoscopy as outpatient, defer to PCP regarding timing       Pt's past medical history, family history, habits, medications and allergies were reviewed with the patient today.  See snap shot for  HCM status. Most recent lab results reviewed with pt. Problem list and histories reviewed & adjusted, as indicated.  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  States he may need to send me paperwork for him to remain off of work over the next few weeks while he is seeing specialists in regards to multiple medical issues.  Patient's breathing is better.  Edema in lower extremities much better.  Denies orthopnea or PND.  Patient has a Mayberry catheter currently.  States that nephrology told him to drink about 3 L of water per day.  States he is putting out about 2 L of urine in his Mayberry bag at night and so does not feel like he can switch over to straight cath treatments at this time because of the amount of urine volume.  Denies chest pain or abdominal pain.  Has appointment with nephrology tomorrow     Additional ROS:   Constitutional, HEENT, Cardiovascular, Pulmonary, GI and , Neuro, MSK and Psych review of systems/symptoms are otherwise negative or unchanged from previous, except as noted above.      OBJECTIVE:  /74   Pulse 68   Temp 98.2  F (36.8  C) (Oral)   Resp 16   Wt 101.6 kg (224 lb)   SpO2 99%   BMI 31.24 kg/m     Estimated body mass index is 31.24 kg/m  as calculated from the following:    Height as of 6/21/19: 1.803 m (5' 11\").    Weight as of this encounter: 101.6 kg (224 lb).     Neck: no adenopathy. Thyroid normal to palpation. No bruits. No JVD  Pulm: Lungs clear to auscultation   CV: Regular rates and rhythm  GI: Soft, nontender, Normal active bowel sounds, No hepatosplenomegaly or " masses palpable  Ext: Peripheral pulses intact. 1 plus BLE edema. Mayberry bag attached to RLE  Neuro: Normal strength and tone, sensory exam grossly normal    Assessment/Plan: (See plan discussion below for further details)  1. Anemia, unspecified type  Previous hospital labs showed low iron with normal capacity and normal ferritin.  We will recheck to assess if true iron deficiency.  If so, will need iron replacement.  If not, will likely benefit from Aranesp  - Iron and iron binding capacity  - Hemoglobin  - Reticulocyte count    2. CKD (chronic kidney disease) stage 4, GFR 15-29 ml/min (H)  Kidney function slowly improving.  Patient to see nephrology today as scheduled.  Check vitamin D level with elevated PTH  - Vitamin D Deficiency    3. Cardiomyopathy, unspecified type (H)  On carvedilol.  Unable to tolerate ACE inhibitor at this time.  Also on isosorbide started by cardiology etiology of cardiomyopathy unclear.  Not a candidate for coronary angiogram at this time with renal function.  Patient will follow-up with cardiology.  Probable repeat echo in 3 months time.  If heart rate remains at current level or higher, will then have some room to titrate carvedilol dose up further to 25 mg twice daily but will hold off at this time.  Patient diuresing well so will defer diuretic therapy.  Weight down significantly already    4. Bladder outflow obstruction  Currently has Mayberry catheter in place.  Patient will talk with nephrology regarding reducing amount of oral fluid intake to lessen urine output.  If able to lessen the output, will then be a candidate for near future change to straight cath management     Plan discussion:  Continue current meds   Appt with  Urology re: Mayberry and possible straight cath in future and discussion re: elevated PSA and future monitoring   See kidney specialist tomorrow as scheduled. Labs today for Vit D,low Hemoglobin, retic ct. Ask them about Vit D supplementation and  Possible Aranesp  and/or iron supplementation based on today's labs  for anemia and also reducing oral fluid intake to lessen urine output now to make it easier to transition to straigth cath by reducing nighttime urine quantity  Appt with Cardiology re: reduced heart function. [phone]  Will remain off of work for now   See me back in 2-3 weeks from now after see specialists to determine timing of going back to work       Krzysztof Thakur MD  Internal Medicine Department  [facility]    (Chart documentation was completed, in part, with [employer] voice-recognition software. Even though reviewed, some grammatical, spelling, and word errors may remain.)

## 2019-07-01 NOTE — PATIENT INSTRUCTIONS
Continue current meds   Appt with  Urology re: Mayberry and possible straight cath in future and discussion re: elevated PSA and future monitoring   See kidney specialist tomorrow as scheduled. Labs today for Vit D,low Hemoglobin, retic ct. Ask them about Vit D supplementation and  Possible Aranesp and/or iron supplementation based on today's labs  for anemia and also reducing oral fluid intake to lessen urine output now to make it easier to transition to straigth cath by reducing nighttime urine quantity  Appt with Cardiology re: reduced heart function. [phone]  Will remain off of work for now   See me back in 2-3 weeks from now after see specialists to determine timing of going back to work

## 2019-07-02 ENCOUNTER — TRANSFERRED RECORDS (OUTPATIENT)
Dept: HEALTH INFORMATION MANAGEMENT | Facility: CLINIC | Age: 64
End: 2019-07-02

## 2019-07-02 PROBLEM — I42.9 CARDIOMYOPATHY, UNSPECIFIED TYPE (H): Status: ACTIVE | Noted: 2019-07-02

## 2019-07-02 PROBLEM — N18.4 CKD (CHRONIC KIDNEY DISEASE) STAGE 4, GFR 15-29 ML/MIN (H): Status: ACTIVE | Noted: 2019-07-02

## 2019-07-02 PROBLEM — N32.0 BLADDER OUTFLOW OBSTRUCTION: Status: ACTIVE | Noted: 2019-07-02

## 2019-07-02 PROBLEM — D64.9 ANEMIA, UNSPECIFIED TYPE: Status: ACTIVE | Noted: 2019-07-02

## 2019-07-02 LAB — DEPRECATED CALCIDIOL+CALCIFEROL SERPL-MC: 37 UG/L (ref 20–75)

## 2019-07-11 ENCOUNTER — OFFICE VISIT (OUTPATIENT)
Dept: CARDIOLOGY | Facility: CLINIC | Age: 64
End: 2019-07-11
Attending: PHYSICIAN ASSISTANT
Payer: COMMERCIAL

## 2019-07-11 VITALS
HEIGHT: 72 IN | WEIGHT: 212.9 LBS | HEART RATE: 66 BPM | BODY MASS INDEX: 28.84 KG/M2 | DIASTOLIC BLOOD PRESSURE: 72 MMHG | SYSTOLIC BLOOD PRESSURE: 119 MMHG

## 2019-07-11 DIAGNOSIS — I10 BENIGN ESSENTIAL HYPERTENSION: ICD-10-CM

## 2019-07-11 DIAGNOSIS — I42.9 CARDIOMYOPATHY, UNSPECIFIED TYPE (H): Primary | ICD-10-CM

## 2019-07-11 DIAGNOSIS — N18.4 CKD (CHRONIC KIDNEY DISEASE) STAGE 4, GFR 15-29 ML/MIN (H): ICD-10-CM

## 2019-07-11 PROCEDURE — 99214 OFFICE O/P EST MOD 30 MIN: CPT | Performed by: NURSE PRACTITIONER

## 2019-07-11 RX ORDER — QUINIDINE GLUCONATE 324 MG
1 TABLET, EXTENDED RELEASE ORAL DAILY
COMMUNITY

## 2019-07-11 RX ORDER — CARVEDILOL 12.5 MG/1
12.5 TABLET ORAL 2 TIMES DAILY WITH MEALS
Qty: 180 TABLET | Refills: 3 | Status: SHIPPED | OUTPATIENT
Start: 2019-07-11 | End: 2020-07-13

## 2019-07-11 RX ORDER — ISOSORBIDE MONONITRATE 30 MG/1
30 TABLET, EXTENDED RELEASE ORAL DAILY
Qty: 90 TABLET | Refills: 3 | Status: SHIPPED | OUTPATIENT
Start: 2019-07-11 | End: 2020-07-13

## 2019-07-11 RX ORDER — ATORVASTATIN CALCIUM 40 MG/1
40 TABLET, FILM COATED ORAL EVERY EVENING
Qty: 90 TABLET | Refills: 3 | Status: SHIPPED | OUTPATIENT
Start: 2019-07-11 | End: 2019-07-18

## 2019-07-11 RX ORDER — HYDRALAZINE HYDROCHLORIDE 25 MG/1
25 TABLET, FILM COATED ORAL 3 TIMES DAILY
Qty: 270 TABLET | Refills: 3 | Status: SHIPPED | OUTPATIENT
Start: 2019-07-11 | End: 2019-09-30

## 2019-07-11 ASSESSMENT — MIFFLIN-ST. JEOR: SCORE: 1795.77

## 2019-07-11 NOTE — PROGRESS NOTES
Cardiology Clinic Progress Note  Ras Esparza MRN# [id number]   YOB: 1955 Age: 64 year old   Primary Cardiologist: Dr. Adam  Reason for visit: hospital follow up            Assessment and Plan:   Ras Esparza is a very pleasant 64 year old male with history of hypertension and tobacco use, who has not sought out routine care, recently hospitalizated 6/21/19-6/25/19  where he was diagnosed with HFrEF (likely ischemic), hypertension, acute kidney injury secondary to bladder outlet obstruction with bilateral hydronephrosis and hydroureter and anemia. Patient here today for post hospital follow up.     1.  Chronic systolic heart failure/HFrEF - NTproBNP 45,492. Echocardiogram shows EF 30% with severe global hypokinesis, LV moderately dilated, RV normal size/function, no significant valvular disease. Etiology of cardiomyopathy unknown at this time, ischemic cardiomyopathy not excluded. Coronary angiogram not pursued during hospital stay due to LEIGHA. No ACS symptoms. Patient started on carvedilol, hydralazine and imdur. No ACEI/ARB or aldactone antagonist due to renal failure. Patient with significant diuresis, admission weight 256#. Weight today 212#. Patient appears compensated and euvolemic today. HF symptoms well controlled. Patient having labs drawn tomorrow per nephrology.    - NYHA class II, stage C   - Etiology : unknown, CAD/ICM not excluded yet. Needs ischemic evaluation.    - Fluid status : appears euvolemic today   - Diuretic regimen : none   - Last RHC : none   - Ischemic evaluation : Needs ischemic evaluation with preferably coronary angiography if renal function improves vs stress test if he has persistent renal dysfunction. Will continue to monitor kidney function. Patient getting labs completed tomorrow.    - Guideline directed medical therapy    - Betablocker: carvedilol 12.5mg BID    - ACEI/ARB/ARNI: none r/t LEIGHA/CKD    - Aldactone antagonist: none r/t LEIGHA/CKD    - Continue  isosorbide mononitrate 30mg daily    - Resume hydralazine 25mg TID   - Sudden cardiac death prophylaxis : repeat echocardiogram in 3 months after optimization or medications or after revascularization if indicated. If LVEF remains less than or equal to 35% will have patient evaluated for ICD consideration.    - HF education given today, reviewed low sodium diet, reviewed when to notify clinic.     2. Acute on chronic renal failure - secondary to bladder outlet obstruction with bilateral hydronephrosis and hydroureter, his initial creatinine was 9.59. Creatinine improved to 4.73 6/28/19 without needing dialysis.    - Following with nephrology, plan for follow up labs tomorrow (renal panel/hemoglobin).     3. Hypertension - controlled    4. Vascular calcifications on CT of abdomen   - Continue aspirin and atorvastatin   - Plan for further ischemic evaluation once stabilization of kidney function.     5. Former tobacco use - quit tobacco use February 2019. Has remained smoking cessation.         Changes today: restart hydralazine 25mg TID    Follow up plan:     Plan for labs tomorrow (renal panel/hemoglobin) per nephrology. Seeing PCP on 7/16/19.     Follow up with Dr. Adam in 6 weeks - still needing ischemic evaluation, review lab results.         History of Presenting Illness:    Ras Esparza is a very pleasant 64 year old male with history of hypertension and tobacco use, who has not sought out routine care, recently hospitalization where he was diagnosed with HFrEF (likely ischemic), hypertension, acute kidney injury secondary to bladder outlet obstruction with bilateral hydronephrosis and hydroureter and anemia.     Patient hospitalized 6/21/19-6/25/19 related to acute kidney injury secondary to bladder outlet obstruction with bilateral hydronephrosis and hydroureter, his initial creatinine was 9.59. Creatinine improved to 4.73 6/28/19 without needing dialysis. NTproBNP 45,492. Complaints of worsening  exertional dyspnea, lower extremity edema and one episode of typical chest pain/diaphoresis lasting 2hrs approx 1-2 months ago. Echocardiogram shows EF 30% with severe global hypokinesis, LV moderately dilated, RV normal size/function, no significant valvular disease. Etiology of cardiomyopathy unknown at this time, ischemic cardiomyopathy not excluded. Coronary angiogram not pursued during hospital stay due to LEIGHA. Patient started on carvedilol, hydralazine and imdur. No ACEI/ARB or aldactone antagonist due to renal failure. Vascular calcifications noted on CT of abdomen/pelvis- started on statin therapy. Admission weight 256# Discharge weight 237#. Patient was discharged with milligan catheter and follow up with urology. Patient notes he wasn't discharged on hydralazine.     Patient was seen by outpatient nephrology 7/2/19 at which time they recommended checking labs in 1 week, nephrology follow up in 2 months, and start fe gluconate.     Patient is here today for post hospital follow up.     Patient reports feeling good. Weight has further decreased from 237# on 6/25/19 -> 212# today. Still notes feeling fatigued, especially come the evening. Patient denies chest pain or chest tightness. Denies shortness of breath at rest. Denies exertional dyspnea with current levels of activity, able to mow lawn with push mower for 20mins and able to go up and down stairs with no difficulty. Lower extremity edema resolved. Denies abdominal distention/bloating. Sleeping good. Denies orthopnea or PND. Denies dizziness, lightheadedness or other presyncopal symptoms. Denies tachycardia or palpitations. Denies episodes of bleeding. Taking medications daily.     Labs from 6/28/19 showed continued improvement in kidney function, creatinine 4.7, stable electrolytes. 7/1/19 hemoglobin 9.5. Blood pressure 119/72 and HR 66 in clinic today.    Appetite good. Following renal diet closely. Limiting sodium intake. Eating meals at home. No set  exercise routine, getting activities with ADLs, walking daily - will go to the mall to walk. Denies alcohol use - quit > 1 year ago. Quit tobacco use 2019        Recent Hospitalizations   19-19 related to acute kidney injury secondary to bladder outlet obstruction with bilateral hydronephrosis and hydroureter, his initial creatinine was 9.59. Creatinine improved to 4.73 19 without needing dialysis. NTproBNP 45,492. New diagnosis of cardiomyopathy with LVEF 30%.         Social History    , 1 daughter, on disability. Enjoys fishing. Enjoy the outdoors.   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IT)   Social Needs     Financial resource strain: Not on file     Food insecurity:     Worry: Not on file     Inability: Not on file     Transportation needs:     Medical: Not on file     Non-medical: Not on file   Tobacco Use     Smoking status: Former Smoker     Packs/day: 0.50     Years: 30.00     Pack years: 15.00     Types: Cigarettes     Last attempt to quit: 2019     Years since quittin.1     Smokeless tobacco: Never Used     Tobacco comment: 12 cigarettes per day   Substance and Sexual Activity     Alcohol use: Not Currently     Alcohol/week: 0.0 oz     Comment: quit in .      Drug use: Never     Sexual activity: Yes     Partners: Female   Lifestyle     Physical activity:     Days per week: Not on file     Minutes per session: Not on file     Stress: Not on file   Relationships     Social connections:     Talks on phone: Not on file     Gets together: Not on file     Attends Quaker service: Not on file     Active member of club or organization: Not on file     Attends meetings of clubs or organizations: Not on file     Relationship status: Not on file     Intimate  "partner violence:     Fear of current or ex partner: Not on file     Emotionally abused: Not on file     Physically abused: Not on file     Forced sexual activity: Not on file   Other Topics Concern     Parent/sibling w/ CABG, MI or angioplasty before 65F 55M? Not Asked   Social History Narrative     Not on file            Review of Systems:   Skin:  Negative     Eyes:  Positive for glasses  ENT:  Negative    Respiratory:  Positive for cough  Cardiovascular:  Negative;palpitations;chest pain;dizziness;lightheadedness;syncope or near-syncope;cyanosis;edema fatigue;Positive for;exercise intolerance  Gastroenterology: Negative    Genitourinary:  Positive for    Musculoskeletal:  Positive for back pain  Neurologic:  Negative    Psychiatric:  Negative    Heme/Lymph/Imm:    weight loss  Endocrine:  Negative           Physical Exam:   Vitals: /72   Pulse 66   Ht 1.816 m (5' 11.5\")   Wt 96.6 kg (212 lb 14.4 oz)   BMI 29.28 kg/m     Wt Readings from Last 4 Encounters:   07/11/19 96.6 kg (212 lb 14.4 oz)   07/01/19 101.6 kg (224 lb)   06/25/19 107.8 kg (237 lb 9.6 oz)   06/20/19 114.8 kg (253 lb)     GEN: well nourished, in no acute distress.  HEENT:  Pupils equal, round. Sclerae nonicteric.   NECK: Supple, no masses appreciated. No JVD appreciated on exam.   C/V:  Regular rate and rhythm, no murmur, rub or gallop.    RESP: Respirations are unlabored. Clear to auscultation bilaterally without wheezing, rales, or rhonchi.  GI: Abdomen soft, nontender.  : milligan catheter in place  EXTREM: No LE edema.  NEURO: Alert and oriented, cooperative.  SKIN: Warm and dry.        Data:   ECHO 6/22/19  The left ventricle is moderately dilated.  There is mild concentric left ventricular hypertrophy.  LVEF 30% based on biplane 2D tracing.  Diastolic Doppler findings (E/E' ratio and/or other parameters) suggest left  ventricular filling pressures are increased.  There is severe global hypokinesia of the left ventricle.  There is no " thrombus seen in the left ventricle.  No significant valvular heart disease.    LIVER ENZYME RESULTS:  Lab Results   Component Value Date    AST 15 06/21/2019    ALT 38 06/21/2019     CBC RESULTS:  Lab Results   Component Value Date    WBC 8.1 06/22/2019    RBC 2.96 (L) 06/22/2019    HGB 9.5 (L) 07/01/2019    HCT 27.7 (L) 06/22/2019    MCV 94 06/22/2019    MCH 30.4 06/22/2019    MCHC 32.5 06/22/2019    RDW 15.5 (H) 06/22/2019     06/22/2019     BMP RESULTS:  Lab Results   Component Value Date     06/28/2019    POTASSIUM 4.3 06/28/2019    CHLORIDE 111 (H) 06/28/2019    CO2 20 06/28/2019    ANIONGAP 8 06/28/2019     (H) 06/28/2019    BUN 45 (H) 06/28/2019    CR 4.73 (H) 06/28/2019    GFRESTIMATED 12 (L) 06/28/2019    GFRESTBLACK 14 (L) 06/28/2019    ELICIA 8.2 (L) 06/28/2019             Medications     Current Outpatient Medications   Medication Sig Dispense Refill     acetaminophen (TYLENOL) 325 MG tablet Take 2 tablets (650 mg) by mouth every 4 hours as needed for mild pain 60 tablet 0     aspirin (ASA) 81 MG chewable tablet Take 1 tablet (81 mg) by mouth daily 30 tablet 11     atorvastatin (LIPITOR) 40 MG tablet Take 1 tablet (40 mg) by mouth every evening 30 tablet 1     carvedilol (COREG) 12.5 MG tablet Take 1 tablet (12.5 mg) by mouth 2 times daily (with meals) 60 tablet 1     Ferrous Gluconate 240 (27 Fe) MG TABS Take by mouth daily       isosorbide mononitrate (IMDUR) 30 MG 24 hr tablet Take 1 tablet (30 mg) by mouth daily 30 tablet 1     magnesium oxide (MAG-OX) 400 MG tablet Take 1 tablet (400 mg) by mouth 2 times daily 60 tablet 0          Past Medical History     Past Medical History:   Diagnosis Date     Tobacco abuse      History reviewed. No pertinent surgical history.  Family History   Problem Relation Age of Onset     Glaucoma Mother      Throat cancer Father      Rheumatoid Arthritis Sister      Alcoholism Brother      Liver Disease Brother             Allergies   Patient has no  known allergies.        MAGNOLIA [facility]  Pager: [phone]

## 2019-07-11 NOTE — PATIENT INSTRUCTIONS
1. Restart hydralazine 25mg 3 x a day  2. Follow up with Dr. Adam in 4-6 weeks  3. Please call with any additional questions/concerns [phone]

## 2019-07-11 NOTE — LETTER
7/11/2019    Krzysztof Thakur MD  [address] [facility] [address]    RE: Ras Esparza       Dear Colleague,    I had the pleasure of seeing Ras Esparza in the [facility].    Cardiology Clinic Progress Note  Ras Esparza MRN# [id number]   YOB: 1955 Age: 64 year old   Primary Cardiologist: Dr. Adam  Reason for visit: hospital follow up            Assessment and Plan:   Ras Esparza is a very pleasant 64 year old male with history of hypertension and tobacco use, who has not sought out routine care, recently hospitalizated 6/21/19-6/25/19  where he was diagnosed with HFrEF (likely ischemic), hypertension, acute kidney injury secondary to bladder outlet obstruction with bilateral hydronephrosis and hydroureter and anemia. Patient here today for post hospital follow up.     1.  Chronic systolic heart failure/HFrEF - NTproBNP 45,492. Echocardiogram shows EF 30% with severe global hypokinesis, LV moderately dilated, RV normal size/function, no significant valvular disease. Etiology of cardiomyopathy unknown at this time, ischemic cardiomyopathy not excluded. Coronary angiogram not pursued during hospital stay due to LEIGHA. No ACS symptoms. Patient started on carvedilol, hydralazine and imdur. No ACEI/ARB or aldactone antagonist due to renal failure. Patient with significant diuresis, admission weight 256#. Weight today 212#. Patient appears compensated and euvolemic today. HF symptoms well controlled. Patient having labs drawn tomorrow per nephrology.    - NYHA class II, stage C   - Etiology : unknown, CAD/ICM not excluded yet. Needs ischemic evaluation.    - Fluid status : appears euvolemic today   - Diuretic regimen : none   - Last RHC : none   - Ischemic evaluation : Needs ischemic evaluation with preferably coronary angiography if renal function improves vs stress test if he has persistent renal dysfunction. Will continue to monitor kidney function. Patient  getting labs completed tomorrow.    - Guideline directed medical therapy    - Betablocker: carvedilol 12.5mg BID    - ACEI/ARB/ARNI: none r/t LEIGHA/CKD    - Aldactone antagonist: none r/t LEIGHA/CKD    - Continue isosorbide mononitrate 30mg daily    - Resume hydralazine 25mg TID   - Sudden cardiac death prophylaxis : repeat echocardiogram in 3 months after optimization or medications or after revascularization if indicated. If LVEF remains less than or equal to 35% will have patient evaluated for ICD consideration.    - HF education given today, reviewed low sodium diet, reviewed when to notify clinic.     2. Acute on chronic renal failure - secondary to bladder outlet obstruction with bilateral hydronephrosis and hydroureter, his initial creatinine was 9.59. Creatinine improved to 4.73 6/28/19 without needing dialysis.    - Following with nephrology, plan for follow up labs tomorrow (renal panel/hemoglobin).     3. Hypertension - controlled    4. Vascular calcifications on CT of abdomen   - Continue aspirin and atorvastatin   - Plan for further ischemic evaluation once stabilization of kidney function.     5. Former tobacco use - quit tobacco use February 2019. Has remained smoking cessation.         Changes today: restart hydralazine 25mg TID    Follow up plan:     Plan for labs tomorrow (renal panel/hemoglobin) per nephrology. Seeing PCP on 7/16/19.     Follow up with Dr. Adam in 6 weeks - still needing ischemic evaluation, review lab results.         History of Presenting Illness:    Ras Esparza is a very pleasant 64 year old male with history of hypertension and tobacco use, who has not sought out routine care, recently hospitalization where he was diagnosed with HFrEF (likely ischemic), hypertension, acute kidney injury secondary to bladder outlet obstruction with bilateral hydronephrosis and hydroureter and anemia.     Patient hospitalized 6/21/19-6/25/19 related to acute kidney injury secondary to bladder  outlet obstruction with bilateral hydronephrosis and hydroureter, his initial creatinine was 9.59. Creatinine improved to 4.73 6/28/19 without needing dialysis. NTproBNP 45,492. Complaints of worsening exertional dyspnea, lower extremity edema and one episode of typical chest pain/diaphoresis lasting 2hrs approx 1-2 months ago. Echocardiogram shows EF 30% with severe global hypokinesis, LV moderately dilated, RV normal size/function, no significant valvular disease. Etiology of cardiomyopathy unknown at this time, ischemic cardiomyopathy not excluded. Coronary angiogram not pursued during hospital stay due to LEIGHA. Patient started on carvedilol, hydralazine and imdur. No ACEI/ARB or aldactone antagonist due to renal failure. Vascular calcifications noted on CT of abdomen/pelvis- started on statin therapy. Admission weight 256# Discharge weight 237#. Patient was discharged with milligan catheter and follow up with urology. Patient notes he wasn't discharged on hydralazine.     Patient was seen by outpatient nephrology 7/2/19 at which time they recommended checking labs in 1 week, nephrology follow up in 2 months, and start fe gluconate.     Patient is here today for post hospital follow up.     Patient reports feeling good. Weight has further decreased from 237# on 6/25/19 -> 212# today. Still notes feeling fatigued, especially come the evening. Patient denies chest pain or chest tightness. Denies shortness of breath at rest. Denies exertional dyspnea with current levels of activity, able to mow lawn with push mower for 20mins and able to go up and down stairs with no difficulty. Lower extremity edema resolved. Denies abdominal distention/bloating. Sleeping good. Denies orthopnea or PND. Denies dizziness, lightheadedness or other presyncopal symptoms. Denies tachycardia or palpitations. Denies episodes of bleeding. Taking medications daily.     Labs from 6/28/19 showed continued improvement in kidney function, creatinine  4.7, stable electrolytes. 19 hemoglobin 9.5. Blood pressure 119/72 and HR 66 in clinic today.    Appetite good. Following renal diet closely. Limiting sodium intake. Eating meals at home. No set exercise routine, getting activities with ADLs, walking daily - will go to the mall to walk. Denies alcohol use - quit > 1 year ago. Quit tobacco use 2019        Recent Hospitalizations   19-19 related to acute kidney injury secondary to bladder outlet obstruction with bilateral hydronephrosis and hydroureter, his initial creatinine was 9.59. Creatinine improved to 4.73 19 without needing dialysis. NTproBNP 45,492. New diagnosis of cardiomyopathy with LVEF 30%.         Social History    , 1 daughter, on disability. Enjoys fishing. Enjoy the outdoors.   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IT)   Social Needs     Financial resource strain: Not on file     Food insecurity:     Worry: Not on file     Inability: Not on file     Transportation needs:     Medical: Not on file     Non-medical: Not on file   Tobacco Use     Smoking status: Former Smoker     Packs/day: 0.50     Years: 30.00     Pack years: 15.00     Types: Cigarettes     Last attempt to quit: 2019     Years since quittin.1     Smokeless tobacco: Never Used     Tobacco comment: 12 cigarettes per day   Substance and Sexual Activity     Alcohol use: Not Currently     Alcohol/week: 0.0 oz     Comment: quit in 2018.      Drug use: Never     Sexual activity: Yes     Partners: Female   Lifestyle     Physical activity:     Days per week: Not on file     Minutes per session: Not on file     Stress: Not on file   Relationships     Social connections:     Talks on phone: Not on file     Gets together: Not on file     Attends  "Hinduism service: Not on file     Active member of club or organization: Not on file     Attends meetings of clubs or organizations: Not on file     Relationship status: Not on file     Intimate partner violence:     Fear of current or ex partner: Not on file     Emotionally abused: Not on file     Physically abused: Not on file     Forced sexual activity: Not on file   Other Topics Concern     Parent/sibling w/ CABG, MI or angioplasty before 65F 55M? Not Asked   Social History Narrative     Not on file            Review of Systems:   Skin:  Negative     Eyes:  Positive for glasses  ENT:  Negative    Respiratory:  Positive for cough  Cardiovascular:  Negative;palpitations;chest pain;dizziness;lightheadedness;syncope or near-syncope;cyanosis;edema fatigue;Positive for;exercise intolerance  Gastroenterology: Negative    Genitourinary:  Positive for    Musculoskeletal:  Positive for back pain  Neurologic:  Negative    Psychiatric:  Negative    Heme/Lymph/Imm:    weight loss  Endocrine:  Negative           Physical Exam:   Vitals: /72   Pulse 66   Ht 1.816 m (5' 11.5\")   Wt 96.6 kg (212 lb 14.4 oz)   BMI 29.28 kg/m      Wt Readings from Last 4 Encounters:   07/11/19 96.6 kg (212 lb 14.4 oz)   07/01/19 101.6 kg (224 lb)   06/25/19 107.8 kg (237 lb 9.6 oz)   06/20/19 114.8 kg (253 lb)     GEN: well nourished, in no acute distress.  HEENT:  Pupils equal, round. Sclerae nonicteric.   NECK: Supple, no masses appreciated. No JVD appreciated on exam.   C/V:  Regular rate and rhythm, no murmur, rub or gallop.    RESP: Respirations are unlabored. Clear to auscultation bilaterally without wheezing, rales, or rhonchi.  GI: Abdomen soft, nontender.  : milligan catheter in place  EXTREM: No LE edema.  NEURO: Alert and oriented, cooperative.  SKIN: Warm and dry.        Data:   ECHO 6/22/19  The left ventricle is moderately dilated.  There is mild concentric left ventricular hypertrophy.  LVEF 30% based on biplane 2D " tracing.  Diastolic Doppler findings (E/E' ratio and/or other parameters) suggest left  ventricular filling pressures are increased.  There is severe global hypokinesia of the left ventricle.  There is no thrombus seen in the left ventricle.  No significant valvular heart disease.    LIVER ENZYME RESULTS:  Lab Results   Component Value Date    AST 15 06/21/2019    ALT 38 06/21/2019     CBC RESULTS:  Lab Results   Component Value Date    WBC 8.1 06/22/2019    RBC 2.96 (L) 06/22/2019    HGB 9.5 (L) 07/01/2019    HCT 27.7 (L) 06/22/2019    MCV 94 06/22/2019    MCH 30.4 06/22/2019    MCHC 32.5 06/22/2019    RDW 15.5 (H) 06/22/2019     06/22/2019     BMP RESULTS:  Lab Results   Component Value Date     06/28/2019    POTASSIUM 4.3 06/28/2019    CHLORIDE 111 (H) 06/28/2019    CO2 20 06/28/2019    ANIONGAP 8 06/28/2019     (H) 06/28/2019    BUN 45 (H) 06/28/2019    CR 4.73 (H) 06/28/2019    GFRESTIMATED 12 (L) 06/28/2019    GFRESTBLACK 14 (L) 06/28/2019    ELICIA 8.2 (L) 06/28/2019             Medications     Current Outpatient Medications   Medication Sig Dispense Refill     acetaminophen (TYLENOL) 325 MG tablet Take 2 tablets (650 mg) by mouth every 4 hours as needed for mild pain 60 tablet 0     aspirin (ASA) 81 MG chewable tablet Take 1 tablet (81 mg) by mouth daily 30 tablet 11     atorvastatin (LIPITOR) 40 MG tablet Take 1 tablet (40 mg) by mouth every evening 30 tablet 1     carvedilol (COREG) 12.5 MG tablet Take 1 tablet (12.5 mg) by mouth 2 times daily (with meals) 60 tablet 1     Ferrous Gluconate 240 (27 Fe) MG TABS Take by mouth daily       isosorbide mononitrate (IMDUR) 30 MG 24 hr tablet Take 1 tablet (30 mg) by mouth daily 30 tablet 1     magnesium oxide (MAG-OX) 400 MG tablet Take 1 tablet (400 mg) by mouth 2 times daily 60 tablet 0          Past Medical History     Past Medical History:   Diagnosis Date     Tobacco abuse      History reviewed. No pertinent surgical history.  Family History    Problem Relation Age of Onset     Glaucoma Mother      Throat cancer Father      Rheumatoid Arthritis Sister      Alcoholism Brother      Liver Disease Brother             Allergies   Patient has no known allergies.        MAGNOLIA Sheikh CNP  [facility]  Pager: [phone]      Thank you for allowing me to participate in the care of your patient.      Sincerely,     MAGNOLIA Sheikh CNP     [facility]

## 2019-07-12 DIAGNOSIS — Z13.220 SCREENING FOR HYPERLIPIDEMIA: ICD-10-CM

## 2019-07-12 DIAGNOSIS — I42.9 CARDIOMYOPATHY, UNSPECIFIED TYPE (H): ICD-10-CM

## 2019-07-12 DIAGNOSIS — N13.9 ACUTE UNILATERAL OBSTRUCTIVE UROPATHY: ICD-10-CM

## 2019-07-12 DIAGNOSIS — N17.9 ACUTE KIDNEY FAILURE, UNSPECIFIED (H): ICD-10-CM

## 2019-07-12 LAB
ALBUMIN SERPL-MCNC: 3.2 G/DL (ref 3.4–5)
ANION GAP SERPL CALCULATED.3IONS-SCNC: 4 MMOL/L (ref 3–14)
BUN SERPL-MCNC: 64 MG/DL (ref 7–30)
CALCIUM SERPL-MCNC: 8.6 MG/DL (ref 8.5–10.1)
CHLORIDE SERPL-SCNC: 110 MMOL/L (ref 94–109)
CHOLEST SERPL-MCNC: 64 MG/DL
CO2 SERPL-SCNC: 22 MMOL/L (ref 20–32)
CREAT SERPL-MCNC: 4.13 MG/DL (ref 0.66–1.25)
GFR SERPL CREATININE-BSD FRML MDRD: 14 ML/MIN/{1.73_M2}
GLUCOSE SERPL-MCNC: 110 MG/DL (ref 70–99)
HDLC SERPL-MCNC: 38 MG/DL
HGB BLD-MCNC: 10.2 G/DL (ref 13.3–17.7)
LDLC SERPL CALC-MCNC: 17 MG/DL
NONHDLC SERPL-MCNC: 26 MG/DL
PHOSPHATE SERPL-MCNC: 4 MG/DL (ref 2.5–4.5)
POTASSIUM SERPL-SCNC: 5 MMOL/L (ref 3.4–5.3)
SODIUM SERPL-SCNC: 136 MMOL/L (ref 133–144)
TRIGL SERPL-MCNC: 47 MG/DL

## 2019-07-12 PROCEDURE — 80069 RENAL FUNCTION PANEL: CPT | Performed by: INTERNAL MEDICINE

## 2019-07-12 PROCEDURE — 80061 LIPID PANEL: CPT | Performed by: INTERNAL MEDICINE

## 2019-07-12 PROCEDURE — 36415 COLL VENOUS BLD VENIPUNCTURE: CPT | Performed by: INTERNAL MEDICINE

## 2019-07-12 PROCEDURE — 85018 HEMOGLOBIN: CPT | Performed by: INTERNAL MEDICINE

## 2019-07-18 ENCOUNTER — OFFICE VISIT (OUTPATIENT)
Dept: INTERNAL MEDICINE | Facility: CLINIC | Age: 64
End: 2019-07-18
Payer: COMMERCIAL

## 2019-07-18 VITALS
HEART RATE: 65 BPM | OXYGEN SATURATION: 99 % | RESPIRATION RATE: 16 BRPM | SYSTOLIC BLOOD PRESSURE: 120 MMHG | WEIGHT: 207 LBS | DIASTOLIC BLOOD PRESSURE: 78 MMHG | BODY MASS INDEX: 28.47 KG/M2 | TEMPERATURE: 97.6 F

## 2019-07-18 DIAGNOSIS — N32.0 BLADDER OUTFLOW OBSTRUCTION: ICD-10-CM

## 2019-07-18 DIAGNOSIS — I42.9 CARDIOMYOPATHY, UNSPECIFIED TYPE (H): ICD-10-CM

## 2019-07-18 DIAGNOSIS — D64.9 ANEMIA, UNSPECIFIED TYPE: ICD-10-CM

## 2019-07-18 DIAGNOSIS — E78.5 HYPERLIPIDEMIA LDL GOAL <70: ICD-10-CM

## 2019-07-18 DIAGNOSIS — N18.4 CKD (CHRONIC KIDNEY DISEASE) STAGE 4, GFR 15-29 ML/MIN (H): ICD-10-CM

## 2019-07-18 PROCEDURE — 99214 OFFICE O/P EST MOD 30 MIN: CPT | Performed by: INTERNAL MEDICINE

## 2019-07-18 RX ORDER — ATORVASTATIN CALCIUM 40 MG/1
20 TABLET, FILM COATED ORAL EVERY EVENING
Qty: 90 TABLET | Refills: 3 | Status: ON HOLD
Start: 2019-07-18 | End: 2019-11-21

## 2019-07-18 NOTE — PROGRESS NOTES
Subjective     Ras Esparza is a 64 year old male who presents to clinic today for the following health issues:    HPI   Chief Complaint   Patient presents with     Follow Up     for Iron and to discuss lab results   Pt's past medical history, family history, habits, medications and allergies were reviewed with the patient today.  See snap shot for  HCM status. Most recent lab results reviewed with pt. Problem list and histories reviewed & adjusted, as indicated.  Additional history as below:     Pt last seen 7/1/19 in clinic by me. Plan from that visit as below:    Plan discussion:  Continue current meds   Appt with  Urology re: Mayberry and possible straight cath in future and discussion re: elevated PSA and future monitoring   See kidney specialist tomorrow as scheduled. Labs today for Vit D,low Hemoglobin, retic ct. Ask them about Vit D supplementation and  Possible Aranesp and/or iron supplementation based on today's labs  for anemia and also reducing oral fluid intake to lessen urine output now to make it easier to transition to straigth cath by reducing nighttime urine quantity  Appt with Cardiology re: reduced heart function. [phone]  Will remain off of work for now   See me back in 2-3 weeks from now after see specialists to determine timing of going back to work    Patient was seen by nephrology on 7/2/19.  Was started on ferrous gluconate once a day  Mild  LBP. No radiation.  Better with sitting  Denies chest pain, shortness of breath, abdominal pain, headache, vision changes or side effects with medications.  Prior edema has essentially resolved.  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  Still using a Mayberry catheter        Component      Latest Ref Rng & Units 7/1/2019 7/12/2019   Sodium      133 - 144 mmol/L  136   Potassium      3.4 - 5.3 mmol/L  5.0   Chloride      94 - 109 mmol/L  110 (H)   Carbon  "Dioxide      20 - 32 mmol/L  22   Anion Gap      3 - 14 mmol/L  4   Glucose      70 - 99 mg/dL  110 (H)   Urea Nitrogen      7 - 30 mg/dL  64 (H)   Creatinine      0.66 - 1.25 mg/dL  4.13 (H)   GFR Estimate      >60 mL/min/1.73:m2  14 (L)   GFR Estimate If Black      >60 mL/min/1.73:m2  17 (L)   Calcium      8.5 - 10.1 mg/dL  8.6   Phosphorus      2.5 - 4.5 mg/dL  4.0   Albumin      3.4 - 5.0 g/dL  3.2 (L)   Cholesterol      <200 mg/dL  64   Triglycerides      <150 mg/dL  47   HDL Cholesterol      >39 mg/dL  38 (L)   LDL Cholesterol Calculated      <100 mg/dL  17   Non HDL Cholesterol      <130 mg/dL  26   Iron      35 - 180 ug/dL 19 (L)    Iron Binding Cap      240 - 430 ug/dL 245    Iron Saturation Index      15 - 46 % 8 (L)    % Retic      0.5 - 2.0 % 1.8    Absolute Retic      25 - 95 10e9/L 60.0    Hemoglobin      13.3 - 17.7 g/dL 9.5 (L) 10.2 (L)   Vitamin D Deficiency screening      20 - 75 ug/L 37      Additional ROS:   Constitutional, HEENT, Cardiovascular, Pulmonary, GI and , Neuro, MSK and Psych review of systems/symptoms are otherwise negative or unchanged from previous, except as noted above.      OBJECTIVE:  /78   Pulse 65   Temp 97.6  F (36.4  C) (Oral)   Resp 16   Wt 93.9 kg (207 lb)   SpO2 99%   BMI 28.47 kg/m     Estimated body mass index is 28.47 kg/m  as calculated from the following:    Height as of 7/11/19: 1.816 m (5' 11.5\").    Weight as of this encounter: 93.9 kg (207 lb).     Neck: no adenopathy. Thyroid normal to palpation. No bruits  Pulm: Lungs clear to auscultation   CV: Regular rates and rhythm  GI: Soft, nontender, Normal active bowel sounds, No hepatosplenomegaly or masses palpable  Ext: Peripheral pulses intact.  Trace BLE edema. Mayberry catheter bag present  Neuro: Normal strength and tone, sensory exam grossly normal    Assessment/Plan: (See plan discussion below for further details)  1. CKD (chronic kidney disease) stage 4, GFR 15-29 ml/min (H)  Improving.  Future " kidney function lab as  ordered.  Follow-up with nephrology as scheduled.  Continue Mayberry catheter for now until bladder obstruction issues resolved by Urology  - Basic metabolic panel; Future    2. Anemia, unspecified type  Improving. Pt will add acidity to stomach when taking iron to help with absorption  - Iron and iron binding capacity; Future  - Hemoglobin; Future    3. Cardiomyopathy, unspecified type (H)  Improved with meds. NO orthopnea and breathing well. NO chest pain. Will need future angio to assess for ? CAD once renal fxn improves. Future cardiology appt scheduled    4. Bladder outflow obstruction  Has appt with Urology in  Early August. Continue Mayberry. PSA issue also per Urology    5. Hyperlipidemia LDL goal <70  Lipids too low. Will reduce Atorvastatin to 20mg daily. Recheck 1 month  - atorvastatin (LIPITOR) 40 MG tablet; Take 0.5 tablets (20 mg) by mouth every evening  Dispense: 90 tablet; Refill: 3  - Lipid panel reflex to direct LDL Fasting; Future      Plan discussion:   Reduce Atorvastatin to 40mg tab, 1/2 tab daily for  Cholesterol   Either take iron with some lemonade/OJ or change to Vitron-C, 1 tab daily (OTC)  Fasting labs in 1 month. Be sure to drink water the day of the test   Return to work beginning August with 1/2 days for 1 week and then full time as previous. Will send paperwork to [employer]  See urology in early August as scheduled re: elevated PSA/bladder obstruction.  Continue Mayberry catheter for now  See nephrology in early August as scheduled  See cardiology in late September scheduled       Krzysztof Thakur MD  Internal Medicine Department  [facility]    (Chart documentation was completed, in part, with [address] [address] voice-recognition software. Even though reviewed, some grammatical, spelling, and word errors may remain.)

## 2019-07-31 ENCOUNTER — TELEPHONE (OUTPATIENT)
Dept: INTERNAL MEDICINE | Facility: CLINIC | Age: 64
End: 2019-07-31

## 2019-07-31 NOTE — TELEPHONE ENCOUNTER
Reason for call:  Form   Our goal is to have forms completed within 72 hours, however some forms may require a visit or additional information.     Who is the form from? [employer] (if other please explain)  Where did the form come from? Patient or family brought in     What clinic location was the form placed at? [facility]  Where was the form placed? Pcp's Folder  What number is listed as a contact on the form? [phone]    Phone call message - patient request for a letter, form or note:     Date needed: as soon as possible  Patient will  at the clinic when completed  Has the patient signed a consent form for release of information? YES    Additional comments:     Type of letter, form or note: [employer] Certification    Phone number to reach patient:  Cell number on file:    Telephone Information:   Mobile [phone]       Best Time:      Can we leave a detailed message on this number?  YES

## 2019-07-31 NOTE — TELEPHONE ENCOUNTER
Why am I being sent this message? Review of chart shoes that carvedilol was refilled bu [facility] 7/11/19 for 90 days with refills for 1 year to [facility]. Call [facility] to confirm Rx there as computer shoes RF request was received and inform pt if RX ready. If  [facility] doesn't have RF now waiting, then call into [facility] as per the approval already done 7/11/19

## 2019-07-31 NOTE — TELEPHONE ENCOUNTER
Reason for Call:  Medication or medication refill:    Do you use a [facility]?  NO  Name of the pharmacy and phone number for the current request:  [facility] [facility] [place] - [phone] Fax [phone]    Name of the medication requested:   carvedilol (COREG) 12.5 MG tablet     Other request: the patient received only one medication refill, he needed this medication refilled also, 90 supply pls.  Patient only has one pill left.  Patient walked into the clinic today.    Can we leave a detailed message on this number? YES    Phone number patient can be reached at: Home number on file [phone] (home)    Best Time: anytime    Call taken on 7/31/2019 at 10:53 AM by Hanna Joshua

## 2019-08-06 ENCOUNTER — TRANSFERRED RECORDS (OUTPATIENT)
Dept: HEALTH INFORMATION MANAGEMENT | Facility: CLINIC | Age: 64
End: 2019-08-06

## 2019-08-25 PROBLEM — E78.5 HYPERLIPIDEMIA LDL GOAL <70: Status: ACTIVE | Noted: 2019-08-25

## 2019-09-26 ENCOUNTER — TRANSFERRED RECORDS (OUTPATIENT)
Dept: HEALTH INFORMATION MANAGEMENT | Facility: CLINIC | Age: 64
End: 2019-09-26

## 2019-09-30 ENCOUNTER — OFFICE VISIT (OUTPATIENT)
Dept: CARDIOLOGY | Facility: CLINIC | Age: 64
End: 2019-09-30
Attending: NURSE PRACTITIONER
Payer: COMMERCIAL

## 2019-09-30 VITALS
WEIGHT: 220.4 LBS | SYSTOLIC BLOOD PRESSURE: 140 MMHG | HEART RATE: 67 BPM | HEIGHT: 72 IN | BODY MASS INDEX: 29.85 KG/M2 | DIASTOLIC BLOOD PRESSURE: 88 MMHG

## 2019-09-30 DIAGNOSIS — N18.4 CKD (CHRONIC KIDNEY DISEASE) STAGE 4, GFR 15-29 ML/MIN (H): ICD-10-CM

## 2019-09-30 DIAGNOSIS — E78.5 HYPERLIPIDEMIA LDL GOAL <70: Primary | ICD-10-CM

## 2019-09-30 DIAGNOSIS — I42.9 CARDIOMYOPATHY, UNSPECIFIED TYPE (H): ICD-10-CM

## 2019-09-30 DIAGNOSIS — D64.9 ANEMIA, UNSPECIFIED TYPE: ICD-10-CM

## 2019-09-30 DIAGNOSIS — E78.5 HYPERLIPIDEMIA LDL GOAL <70: ICD-10-CM

## 2019-09-30 DIAGNOSIS — I10 BENIGN ESSENTIAL HYPERTENSION: ICD-10-CM

## 2019-09-30 LAB
ANION GAP SERPL CALCULATED.3IONS-SCNC: 14 MMOL/L (ref 6–17)
BUN SERPL-MCNC: 53 MG/DL (ref 7–30)
CALCIUM SERPL-MCNC: 9.8 MG/DL (ref 8.5–10.5)
CHLORIDE SERPL-SCNC: 110 MMOL/L (ref 98–107)
CHOLEST SERPL-MCNC: 116 MG/DL
CO2 SERPL-SCNC: 19 MMOL/L (ref 23–29)
CREAT SERPL-MCNC: 3.09 MG/DL (ref 0.7–1.3)
GFR SERPL CREATININE-BSD FRML MDRD: 21 ML/MIN/{1.73_M2}
GLUCOSE SERPL-MCNC: 118 MG/DL (ref 70–105)
HDLC SERPL-MCNC: 46 MG/DL
HGB BLD-MCNC: 13.2 G/DL (ref 13.3–17.7)
IRON SATN MFR SERPL: 31 % (ref 15–46)
IRON SERPL-MCNC: 92 UG/DL (ref 35–180)
LDLC SERPL CALC-MCNC: 57 MG/DL
NONHDLC SERPL-MCNC: 70 MG/DL
POTASSIUM SERPL-SCNC: 5 MMOL/L (ref 3.5–5.1)
SODIUM SERPL-SCNC: 138 MMOL/L (ref 136–145)
TIBC SERPL-MCNC: 294 UG/DL (ref 240–430)
TRIGL SERPL-MCNC: 67 MG/DL

## 2019-09-30 PROCEDURE — 99214 OFFICE O/P EST MOD 30 MIN: CPT | Performed by: INTERNAL MEDICINE

## 2019-09-30 PROCEDURE — 36415 COLL VENOUS BLD VENIPUNCTURE: CPT | Performed by: INTERNAL MEDICINE

## 2019-09-30 PROCEDURE — 85018 HEMOGLOBIN: CPT | Performed by: INTERNAL MEDICINE

## 2019-09-30 PROCEDURE — 83550 IRON BINDING TEST: CPT | Performed by: INTERNAL MEDICINE

## 2019-09-30 PROCEDURE — 80048 BASIC METABOLIC PNL TOTAL CA: CPT | Performed by: INTERNAL MEDICINE

## 2019-09-30 PROCEDURE — 80061 LIPID PANEL: CPT | Performed by: INTERNAL MEDICINE

## 2019-09-30 PROCEDURE — 83540 ASSAY OF IRON: CPT | Performed by: INTERNAL MEDICINE

## 2019-09-30 RX ORDER — HYDRALAZINE HYDROCHLORIDE 50 MG/1
50 TABLET, FILM COATED ORAL 3 TIMES DAILY
Qty: 270 TABLET | Refills: 3 | Status: SHIPPED | OUTPATIENT
Start: 2019-09-30 | End: 2019-11-05

## 2019-09-30 ASSESSMENT — MIFFLIN-ST. JEOR: SCORE: 1829.79

## 2019-09-30 NOTE — LETTER
9/30/2019    Krzysztof Thakur MD  [address] [facility] [address]    RE: Ras Esparza       Dear Colleague,    I had the pleasure of seeing Ras Esparza in the [facility].    HPI and Plan:   See dictation(#[id number])    Orders Placed This Encounter   Procedures     NM Lexiscan stress test (nuc card)     Follow-Up with Cardiac Advanced Practice Provider     Cardiac Event Monitor Adult Pediatric     Echocardiogram Complete       Orders Placed This Encounter   Medications     hydrALAZINE (APRESOLINE) 50 MG tablet     Sig: Take 1 tablet (50 mg) by mouth 3 times daily     Dispense:  270 tablet     Refill:  3       Medications Discontinued During This Encounter   Medication Reason     hydrALAZINE (APRESOLINE) 25 MG tablet          Encounter Diagnoses   Name Primary?     Cardiomyopathy, unspecified type (H)      Hyperlipidemia LDL goal <70 Yes     Benign essential hypertension        CURRENT MEDICATIONS:  Current Outpatient Medications   Medication Sig Dispense Refill     acetaminophen (TYLENOL) 325 MG tablet Take 2 tablets (650 mg) by mouth every 4 hours as needed for mild pain 60 tablet 0     aspirin (ASA) 81 MG chewable tablet Take 1 tablet (81 mg) by mouth daily 30 tablet 11     atorvastatin (LIPITOR) 40 MG tablet Take 0.5 tablets (20 mg) by mouth every evening 90 tablet 3     carvedilol (COREG) 12.5 MG tablet Take 1 tablet (12.5 mg) by mouth 2 times daily (with meals) 180 tablet 3     Ferrous Gluconate 240 (27 Fe) MG TABS Take by mouth daily       hydrALAZINE (APRESOLINE) 50 MG tablet Take 1 tablet (50 mg) by mouth 3 times daily 270 tablet 3     isosorbide mononitrate (IMDUR) 30 MG 24 hr tablet Take 1 tablet (30 mg) by mouth daily 90 tablet 3     magnesium oxide (MAG-OX) 400 MG tablet Take 1 tablet (400 mg) by mouth 2 times daily 60 tablet 0       ALLERGIES   No Known Allergies    PAST MEDICAL HISTORY:  Past Medical History:   Diagnosis Date     Hyperlipidemia LDL goal <70 8/25/2019      Tobacco abuse        PAST SURGICAL HISTORY:  History reviewed. No pertinent surgical history.    FAMILY HISTORY:  Family History   Problem Relation Age of Onset     Glaucoma Mother      Throat cancer Father      Rheumatoid Arthritis Sister      Alcoholism Brother      Liver Disease Brother        SOCIAL HISTORY:  Social History     Socioeconomic History     Marital status:      Spouse name: None     Number of children: 1     Years of education: None     Highest education level: None   Occupational History     Comment: tech support (IT)   Social Needs     Financial resource strain: None     Food insecurity:     Worry: None     Inability: None     Transportation needs:     Medical: None     Non-medical: None   Tobacco Use     Smoking status: Former Smoker     Packs/day: 0.50     Years: 30.00     Pack years: 15.00     Types: Cigarettes     Last attempt to quit: 2019     Years since quittin.3     Smokeless tobacco: Never Used     Tobacco comment: 12 cigarettes per day   Substance and Sexual Activity     Alcohol use: Not Currently     Alcohol/week: 0.0 standard drinks     Comment: quit in 2018.      Drug use: Never     Sexual activity: Yes     Partners: Female   Lifestyle     Physical activity:     Days per week: None     Minutes per session: None     Stress: None   Relationships     Social connections:     Talks on phone: None     Gets together: None     Attends Yarsani service: None     Active member of club or organization: None     Attends meetings of clubs or organizations: None     Relationship status: None     Intimate partner violence:     Fear of current or ex partner: None     Emotionally abused: None     Physically abused: None     Forced sexual activity: None   Other Topics Concern     Parent/sibling w/ CABG, MI or angioplasty before 65F 55M? Not Asked   Social History Narrative     None       Review of Systems:  Skin:  Negative       Eyes:  Positive for glasses    ENT:  Negative     "  Respiratory:  Negative       Cardiovascular:    Positive for;dizziness    Gastroenterology: Negative      Genitourinary:  Positive for   milligan cath  Musculoskeletal:  Negative      Neurologic:  Negative      Psychiatric:  Negative      Heme/Lymph/Imm:  Negative weight loss    Endocrine:  Negative        Physical Exam:  Vitals: BP (!) 140/88   Pulse 67   Ht 1.816 m (5' 11.5\")   Wt 100 kg (220 lb 6.4 oz)   BMI 30.31 kg/m       Constitutional:           Skin:             Head:           Eyes:           Lymph:      ENT:           Neck:           Respiratory:            Cardiac:                                                           GI:           Extremities and Muscular Skeletal:                 Neurological:           Psych:             CC  Raquel Tran, MAGNOLIA CNP  [address]  [place] [address]                    Thank you for allowing me to participate in the care of your patient.      Sincerely,     Brandon Adam MD     [facility]    cc:   MAGNOLIA Lal CNP  [address]  [place] [address]        "

## 2019-09-30 NOTE — PROGRESS NOTES
Service Date: 09/30/2019      REASON FOR CARDIOLOGY VISIT:  Followup cardiomyopathy.      HISTORY OF PRESENT ILLNESS:  Mr. Esparza is a very pleasant 64-year-old gentleman who was admitted in 06/2019 with acute renal failure secondary to bladder outlet obstruction with bilateral hydronephrosis and hydroureter and was found to have newly diagnosed cardiomyopathy with reduced LV systolic function with LVEF around 30% with moderately dilated LV and global hypokinesia.  At that time, his creatinine was 9 and it slowly down trended and has eventually has improved to now about 3.  He is following Nephrology.        He did not have any typical anginal symptoms, although there was evidence of vascular calcifications on CT.  He does have history of tobacco abuse and he has quit it since that time.  He was started on carvedilol, hydralazine and Imdur.  In the followup, he was seen by Raquel Tran and I am seeing him in the followup.        The patient has no specific complaints.  He denies any chest discomfort or shortness of breath.  He was fairly active the summer season.  He was cutting the grass, he was walking, climbing up and down the stairs in his basement going grocery shopping.  He did not have any chest discomfort or shortness of breath at rest or with other physical activity.  Initially after discharge, he felt slight dizziness if he would get up too quickly.  This resolved.  He did have an episode of near-syncope about 3 or 4 weeks ago when he was standing in his yard.  He has not had any postural changes in the last several minutes before this episode.  Suddenly he felt dizzy. It is not clear if he actually passed out or not but the episode was very brief.       The patient has not experienced any similar symptoms since then.  As noted above, he has completely quit tobacco.        He is presently on:   1.  Baby aspirin.   2.  Lipitor 20 mg daily.   3.  Carvedilol 12.5 mg b.i.d.   4.  Hydralazine 25 mg t.i.d.   5.   Imdur 30 mg daily.        His LDL is quite well controlled at 57.      PHYSICAL EXAMINATION:   VITAL SIGNS:  Blood pressure 140/88.  Before that, this was 150/92; on recheck it was 140/88, heart rate 67 and regular, weight 220 pounds, BMI 30.31.   GENERAL:  The patient appears pleasant, comfortable.   NECK:  Normal JVP, no bruit.   CARDIOVASCULAR SYSTEM:  S1, S2 normal, no murmur, rub or gallop.   RESPIRATORY SYSTEM:  Clear to auscultation bilaterally.   GASTROINTESTINAL SYSTEM:  Abdomen soft, nontender.   EXTREMITIES:  No pitting pedal edema.   NEUROLOGICAL:  Alert, oriented x3.   PSYCHIATRIC:  Normal affect.   SKIN:  No obvious rash.   HEENT:  No pallor or icterus.      IMPRESSION AND PLAN:  A very pleasant 62-year-old gentleman with newly diagnosed cardiomyopathy with LVEF of 30% with moderately dilated LV.  Clinically, not in congestive heart failure.  This was diagnosed in the setting of acute renal failure in the setting of bladder outlet obstruction.  His creatinine was 9 at one point and has now improved to 3.  He is still stage IV chronic kidney disease.  He is on carvedilol, hydralazine, Imdur.  He does have hypertension with blood pressure elevated today.  I recommend increasing hydralazine to 50 mg t.i.d.        I had a long discussion with the patient regarding the cardiomyopathy.  So far we do not know the particular etiology of cardiomyopathy.  Ischemic cardiomyopathy cannot be ruled out.  Fortunately, he does not have any symptoms of angina.        We discussed at length about option of coronary angiogram.  This will involve exposure to IV contrast and I am somewhat wary at this time exposing him to that with his creatinine  still quite elevated at 3 and GFR 21.  We discussed option of stress test as another way of diagnosing CAD and risk stratification.  The patient is agreeable for stress testing.  We will do a Lexiscan stress test.  I recommended that he should not take any caffeine for 12 hours  before the test.  We will also repeat an echocardiogram to reassess LV function.        The episode of near-syncope 3 weeks ago is somewhat concerning, especially with underlying cardiomyopathy,  Fortunately, he has not had any recurrence since then.  At this time, I recommend doing an event monitor to rule out any culprit arrhythmia as the potential etiology of this single episode of dizziness.  In case his LV function is less than 35% and/or there is evidence of any ventricular tachycardia on event monitor, I would recommend review with EP for consideration of ICD.  I recommend continuing close followup in Cardiology Clinic.  I am requesting a followup in about a month.  I also recommended to the patient that if he gets another episode of dizziness or if he actually passes out or near passes out, he should call our clinic.      cc:   Krzysztof Thakur MD    [facility]   [address] [place] [id number]         SAHIL SULLIVAN MD             D: 2019   T: 2019   MT: BRAYAN      Name:     MARIFER DONIS   MRN:      [id number]        Account:      [id number]   :      1955           Service Date: 2019      Document: [id number]

## 2019-09-30 NOTE — PROGRESS NOTES
HPI and Plan:   See dictation(#[id number])    Orders Placed This Encounter   Procedures     NM Lexiscan stress test (nuc card)     Follow-Up with Cardiac Advanced Practice Provider     Cardiac Event Monitor Adult Pediatric     Echocardiogram Complete       Orders Placed This Encounter   Medications     hydrALAZINE (APRESOLINE) 50 MG tablet     Sig: Take 1 tablet (50 mg) by mouth 3 times daily     Dispense:  270 tablet     Refill:  3       Medications Discontinued During This Encounter   Medication Reason     hydrALAZINE (APRESOLINE) 25 MG tablet          Encounter Diagnoses   Name Primary?     Cardiomyopathy, unspecified type (H)      Hyperlipidemia LDL goal <70 Yes     Benign essential hypertension        CURRENT MEDICATIONS:  Current Outpatient Medications   Medication Sig Dispense Refill     acetaminophen (TYLENOL) 325 MG tablet Take 2 tablets (650 mg) by mouth every 4 hours as needed for mild pain 60 tablet 0     aspirin (ASA) 81 MG chewable tablet Take 1 tablet (81 mg) by mouth daily 30 tablet 11     atorvastatin (LIPITOR) 40 MG tablet Take 0.5 tablets (20 mg) by mouth every evening 90 tablet 3     carvedilol (COREG) 12.5 MG tablet Take 1 tablet (12.5 mg) by mouth 2 times daily (with meals) 180 tablet 3     Ferrous Gluconate 240 (27 Fe) MG TABS Take by mouth daily       hydrALAZINE (APRESOLINE) 50 MG tablet Take 1 tablet (50 mg) by mouth 3 times daily 270 tablet 3     isosorbide mononitrate (IMDUR) 30 MG 24 hr tablet Take 1 tablet (30 mg) by mouth daily 90 tablet 3     magnesium oxide (MAG-OX) 400 MG tablet Take 1 tablet (400 mg) by mouth 2 times daily 60 tablet 0       ALLERGIES   No Known Allergies    PAST MEDICAL HISTORY:  Past Medical History:   Diagnosis Date     Hyperlipidemia LDL goal <70 8/25/2019     Tobacco abuse        PAST SURGICAL HISTORY:  History reviewed. No pertinent surgical history.    FAMILY HISTORY:  Family History   Problem Relation Age of Onset     Glaucoma Mother      Throat cancer Father       Rheumatoid Arthritis Sister      Alcoholism Brother      Liver Disease Brother        SOCIAL HISTORY:  Social History     Socioeconomic History     Marital status:      Spouse name: None     Number of children: 1     Years of education: None     Highest education level: None   Occupational History     Comment: tech support (IT)   Social Needs     Financial resource strain: None     Food insecurity:     Worry: None     Inability: None     Transportation needs:     Medical: None     Non-medical: None   Tobacco Use     Smoking status: Former Smoker     Packs/day: 0.50     Years: 30.00     Pack years: 15.00     Types: Cigarettes     Last attempt to quit: 2019     Years since quittin.3     Smokeless tobacco: Never Used     Tobacco comment: 12 cigarettes per day   Substance and Sexual Activity     Alcohol use: Not Currently     Alcohol/week: 0.0 standard drinks     Comment: quit in 2018.      Drug use: Never     Sexual activity: Yes     Partners: Female   Lifestyle     Physical activity:     Days per week: None     Minutes per session: None     Stress: None   Relationships     Social connections:     Talks on phone: None     Gets together: None     Attends [employer] service: None     Active member of club or organization: None     Attends meetings of clubs or organizations: None     Relationship status: None     Intimate partner violence:     Fear of current or ex partner: None     Emotionally abused: None     Physically abused: None     Forced sexual activity: None   Other Topics Concern     Parent/sibling w/ CABG, MI or angioplasty before 65F 55M? Not Asked   Social History Narrative     None       Review of Systems:  Skin:  Negative       Eyes:  Positive for glasses    ENT:  Negative      Respiratory:  Negative       Cardiovascular:    Positive for;dizziness    Gastroenterology: Negative      Genitourinary:  Positive for   milligan cath  Musculoskeletal:  Negative      Neurologic:  Negative     "  Psychiatric:  Negative      Heme/Lymph/Imm:  Negative weight loss    Endocrine:  Negative        Physical Exam:  Vitals: BP (!) 140/88   Pulse 67   Ht 1.816 m (5' 11.5\")   Wt 100 kg (220 lb 6.4 oz)   BMI 30.31 kg/m      Constitutional:           Skin:             Head:           Eyes:           Lymph:      ENT:           Neck:           Respiratory:            Cardiac:                                                           GI:           Extremities and Muscular Skeletal:                 Neurological:           Psych:             CC  Raquel Tran, APRN CNP  [address]  [place] [address]                  "

## 2019-09-30 NOTE — LETTER
9/30/2019      Krzysztof Thakur MD  [address] [facility] [address]      RE: Ras Esparza       Dear Colleague,    I had the pleasure of seeing Ras Esparza in the [facility].    Service Date: 09/30/2019      REASON FOR CARDIOLOGY VISIT:  Followup cardiomyopathy.      HISTORY OF PRESENT ILLNESS:  Mr. Esparza is a very pleasant 64-year-old gentleman who was admitted in 06/2019 with acute renal failure secondary to bladder outlet obstruction with bilateral hydronephrosis and hydroureter and was found to have newly diagnosed cardiomyopathy with reduced LV systolic function with LVEF around 30% with moderately dilated LV and global hypokinesia.  At that time, his creatinine was 9 and it slowly down trended and has eventually has improved to now about 3.  He is following Nephrology.        He did not have any typical anginal symptoms, although there was evidence of vascular calcifications on CT.  He does have history of tobacco abuse and he has quit it since that time.  He was started on carvedilol, hydralazine and Imdur.  In the followup, he was seen by Raquel Tran and I am seeing him in the followup.        The patient has no specific complaints.  He denies any chest discomfort or shortness of breath.  He was fairly active the summer season.  He was cutting the grass, he was walking, climbing up and down the stairs in his basement going grocery shopping.  He did not have any chest discomfort or shortness of breath at rest or with other physical activity.  Initially after discharge, he felt slight dizziness if he would get up too quickly.  This resolved.  He did have an episode of near-syncope about 3 or 4 weeks ago when he was standing in his yard.  He has not had any postural changes in the last several minutes before this episode.  Suddenly he felt dizzy. It is not clear if he actually passed out or not but the episode was very brief.       The patient has not experienced any  similar symptoms since then.  As noted above, he has completely quit tobacco.        He is presently on:   1.  Baby aspirin.   2.  Lipitor 20 mg daily.   3.  Carvedilol 12.5 mg b.i.d.   4.  Hydralazine 25 mg t.i.d.   5.  Imdur 30 mg daily.        His LDL is quite well controlled at 57.      PHYSICAL EXAMINATION:   VITAL SIGNS:  Blood pressure 140/88.  Before that, this was 150/92; on recheck it was 140/88, heart rate 67 and regular, weight 220 pounds, BMI 30.31.   GENERAL:  The patient appears pleasant, comfortable.   NECK:  Normal JVP, no bruit.   CARDIOVASCULAR SYSTEM:  S1, S2 normal, no murmur, rub or gallop.   RESPIRATORY SYSTEM:  Clear to auscultation bilaterally.   GASTROINTESTINAL SYSTEM:  Abdomen soft, nontender.   EXTREMITIES:  No pitting pedal edema.   NEUROLOGICAL:  Alert, oriented x3.   PSYCHIATRIC:  Normal affect.   SKIN:  No obvious rash.   HEENT:  No pallor or icterus.      IMPRESSION AND PLAN:  A very pleasant 62-year-old gentleman with newly diagnosed cardiomyopathy with LVEF of 30% with moderately dilated LV.  Clinically, not in congestive heart failure.  This was diagnosed in the setting of acute renal failure in the setting of bladder outlet obstruction.  His creatinine was 9 at one point and has now improved to 3.  He is still stage IV chronic kidney disease.  He is on carvedilol, hydralazine, Imdur.  He does have hypertension with blood pressure elevated today.  I recommend increasing hydralazine to 50 mg t.i.d.        I had a long discussion with the patient regarding the cardiomyopathy.  So far we do not know the particular etiology of cardiomyopathy.  Ischemic cardiomyopathy cannot be ruled out.  Fortunately, he does not have any symptoms of angina.        We discussed at length about option of coronary angiogram.  This will involve exposure to IV contrast and I am somewhat wary at this time exposing him to that with his creatinine  still quite elevated at 3 and GFR 21.  We discussed option  of stress test as another way of diagnosing CAD and risk stratification.  The patient is agreeable for stress testing.  We will do a Lexiscan stress test.  I recommended that he should not take any caffeine for 12 hours before the test.  We will also repeat an echocardiogram to reassess LV function.        The episode of near-syncope 3 weeks ago is somewhat concerning, especially with underlying cardiomyopathy,  Fortunately, he has not had any recurrence since then.  At this time, I recommend doing an event monitor to rule out any culprit arrhythmia as the potential etiology of this single episode of dizziness.  In case his LV function is less than 35% and/or there is evidence of any ventricular tachycardia on event monitor, I would recommend review with EP for consideration of ICD.  I recommend continuing close followup in Cardiology Clinic.  I am requesting a followup in about a month.  I also recommended to the patient that if he gets another episode of dizziness or if he actually passes out or near passes out, he should call our clinic.      cc:   Krzysztof Thakur MD    [facility]   [address] [place] [address]         SAHIL SULLIVAN MD             D: 2019   T: 2019   MT: BRAYAN      Name:     MARIFER DONIS   MRN:      [id number]        Account:      [id number]   :      1955           Service Date: 2019      Document: [id number]           Outpatient Encounter Medications as of 2019   Medication Sig Dispense Refill     acetaminophen (TYLENOL) 325 MG tablet Take 2 tablets (650 mg) by mouth every 4 hours as needed for mild pain 60 tablet 0     aspirin (ASA) 81 MG chewable tablet Take 1 tablet (81 mg) by mouth daily 30 tablet 11     atorvastatin (LIPITOR) 40 MG tablet Take 0.5 tablets (20 mg) by mouth every evening 90 tablet 3     carvedilol (COREG) 12.5 MG tablet Take 1 tablet (12.5 mg) by mouth 2 times daily (with meals) 180 tablet 3     Ferrous Gluconate 240 (27 Fe)  MG TABS Take by mouth daily       hydrALAZINE (APRESOLINE) 50 MG tablet Take 1 tablet (50 mg) by mouth 3 times daily 270 tablet 3     isosorbide mononitrate (IMDUR) 30 MG 24 hr tablet Take 1 tablet (30 mg) by mouth daily 90 tablet 3     magnesium oxide (MAG-OX) 400 MG tablet Take 1 tablet (400 mg) by mouth 2 times daily 60 tablet 0     [DISCONTINUED] hydrALAZINE (APRESOLINE) 25 MG tablet Take 1 tablet (25 mg) by mouth 3 times daily 270 tablet 3     No facility-administered encounter medications on file as of 9/30/2019.            Again, thank you for allowing me to participate in the care of your patient.      Sincerely,    Brandon Adam MD     [facility]

## 2019-10-03 ENCOUNTER — HOSPITAL ENCOUNTER (OUTPATIENT)
Dept: CARDIOLOGY | Facility: CLINIC | Age: 64
Discharge: HOME | End: 2019-10-03
Attending: INTERNAL MEDICINE | Admitting: INTERNAL MEDICINE
Payer: COMMERCIAL

## 2019-10-03 ENCOUNTER — TELEPHONE (OUTPATIENT)
Dept: CARDIOLOGY | Facility: CLINIC | Age: 64
End: 2019-10-03

## 2019-10-03 VITALS — DIASTOLIC BLOOD PRESSURE: 78 MMHG | SYSTOLIC BLOOD PRESSURE: 120 MMHG

## 2019-10-03 DIAGNOSIS — I42.9 CARDIOMYOPATHY, UNSPECIFIED TYPE (H): ICD-10-CM

## 2019-10-03 PROCEDURE — 78452 HT MUSCLE IMAGE SPECT MULT: CPT

## 2019-10-03 PROCEDURE — 93018 CV STRESS TEST I&R ONLY: CPT | Performed by: INTERNAL MEDICINE

## 2019-10-03 PROCEDURE — A9502 TC99M TETROFOSMIN: HCPCS | Performed by: INTERNAL MEDICINE

## 2019-10-03 PROCEDURE — 25000128 H RX IP 250 OP 636: Performed by: INTERNAL MEDICINE

## 2019-10-03 PROCEDURE — 93016 CV STRESS TEST SUPVJ ONLY: CPT | Performed by: INTERNAL MEDICINE

## 2019-10-03 PROCEDURE — 34300033 ZZH RX 343: Performed by: INTERNAL MEDICINE

## 2019-10-03 PROCEDURE — 78452 HT MUSCLE IMAGE SPECT MULT: CPT | Mod: 26 | Performed by: INTERNAL MEDICINE

## 2019-10-03 RX ORDER — ACYCLOVIR 200 MG/1
0-1 CAPSULE ORAL
Status: DISCONTINUED | OUTPATIENT
Start: 2019-10-03 | End: 2019-10-04 | Stop reason: HOSPADM

## 2019-10-03 RX ORDER — AMINOPHYLLINE 25 MG/ML
50-100 INJECTION, SOLUTION INTRAVENOUS
Status: DISCONTINUED | OUTPATIENT
Start: 2019-10-03 | End: 2019-10-04 | Stop reason: HOSPADM

## 2019-10-03 RX ORDER — ALBUTEROL SULFATE 90 UG/1
2 AEROSOL, METERED RESPIRATORY (INHALATION) EVERY 5 MIN PRN
Status: DISCONTINUED | OUTPATIENT
Start: 2019-10-03 | End: 2019-10-04 | Stop reason: HOSPADM

## 2019-10-03 RX ORDER — REGADENOSON 0.08 MG/ML
0.4 INJECTION, SOLUTION INTRAVENOUS ONCE
Status: COMPLETED | OUTPATIENT
Start: 2019-10-03 | End: 2019-10-03

## 2019-10-03 RX ADMIN — TETROFOSMIN 13.29 MCI.: 1.38 INJECTION, POWDER, LYOPHILIZED, FOR SOLUTION INTRAVENOUS at 14:42

## 2019-10-03 RX ADMIN — TETROFOSMIN 4.45 MCI.: 1.38 INJECTION, POWDER, LYOPHILIZED, FOR SOLUTION INTRAVENOUS at 13:23

## 2019-10-03 RX ADMIN — REGADENOSON 0.4 MG: 0.08 INJECTION, SOLUTION INTRAVENOUS at 14:39

## 2019-10-07 NOTE — TELEPHONE ENCOUNTER
Patient notified of results and agrees with plan of care will continue Lipitor and monitor BP and call with ay questions or concerns.      Mild basal inferolateral ischemia noted. Given still elevated cr and no anginal symptoms and mild burden of ischemia, I recommend for now medical management for CAD.  Option of coronary angiogram can be re visited in future depending upon renal functions and clinical symptoms. F/u as planned.    Thanks  Brandon

## 2019-10-15 ENCOUNTER — HOSPITAL ENCOUNTER (OUTPATIENT)
Dept: CARDIOLOGY | Facility: CLINIC | Age: 64
Discharge: HOME | End: 2019-10-15
Attending: INTERNAL MEDICINE | Admitting: INTERNAL MEDICINE
Payer: COMMERCIAL

## 2019-10-15 ENCOUNTER — TELEPHONE (OUTPATIENT)
Dept: CARDIOLOGY | Facility: CLINIC | Age: 64
End: 2019-10-15

## 2019-10-15 DIAGNOSIS — I42.9 CARDIOMYOPATHY, UNSPECIFIED TYPE (H): ICD-10-CM

## 2019-10-15 PROCEDURE — 93306 TTE W/DOPPLER COMPLETE: CPT

## 2019-10-15 PROCEDURE — 93270 REMOTE 30 DAY ECG REV/REPORT: CPT

## 2019-10-15 PROCEDURE — 93306 TTE W/DOPPLER COMPLETE: CPT | Mod: 26 | Performed by: INTERNAL MEDICINE

## 2019-10-15 PROCEDURE — 93272 ECG/REVIEW INTERPRET ONLY: CPT | Performed by: INTERNAL MEDICINE

## 2019-10-15 NOTE — TELEPHONE ENCOUNTER
Left ventricular systolic function is mildly reduced.  LVEF 46% based on biplane 2D tracing. Mild global hypokinesia of the left  ventricle.  The right ventricle is normal in structure, function and size.  No significant valvular abnormalities.  Mild aortic root dilatation. Max diameter of the visualized portion 4.2 cm.     This study was compared to a prior TTE from 6/22/2019. Global left ventricular  systolic funtion has improved. The aortic root is stable in size, previously  4.3cm, mildly dilated. The ascending aorta was previously measured at 4.1cm,  however it was measured at 3.7cm (within normal limits) on this present study.  _____________________________________________________________________________

## 2019-10-16 NOTE — TELEPHONE ENCOUNTER
Patient notified of Results of echo and has EF has Improved has agreed to continue OV for  follow up 11-5-19. With [facility]    Improvement in LVEF noted. F/u as recommended.    Thanks  Brandon

## 2019-10-18 ENCOUNTER — DOCUMENTATION ONLY (OUTPATIENT)
Dept: CARDIOLOGY | Facility: CLINIC | Age: 64
End: 2019-10-18

## 2019-10-18 NOTE — PROGRESS NOTES
10-15-19 Baseline Rhythm 60bpm   2 strips on 10-16-19 Symptomatic event HR 75bpm and 80bpm sent to file

## 2019-10-22 ENCOUNTER — MEDICAL CORRESPONDENCE (OUTPATIENT)
Dept: HEALTH INFORMATION MANAGEMENT | Facility: CLINIC | Age: 64
End: 2019-10-22

## 2019-10-22 ENCOUNTER — TRANSFERRED RECORDS (OUTPATIENT)
Dept: HEALTH INFORMATION MANAGEMENT | Facility: CLINIC | Age: 64
End: 2019-10-22

## 2019-10-22 DIAGNOSIS — N18.4 CHRONIC KIDNEY DISEASE, STAGE IV (SEVERE) (H): Primary | ICD-10-CM

## 2019-10-22 DIAGNOSIS — I10 ESSENTIAL HYPERTENSION, MALIGNANT: ICD-10-CM

## 2019-10-22 DIAGNOSIS — N17.9 ACUTE KIDNEY FAILURE, UNSPECIFIED (H): ICD-10-CM

## 2019-10-31 ENCOUNTER — OFFICE VISIT (OUTPATIENT)
Dept: INTERNAL MEDICINE | Facility: CLINIC | Age: 64
End: 2019-10-31
Payer: COMMERCIAL

## 2019-10-31 VITALS
DIASTOLIC BLOOD PRESSURE: 78 MMHG | OXYGEN SATURATION: 99 % | HEART RATE: 63 BPM | WEIGHT: 228 LBS | BODY MASS INDEX: 31.36 KG/M2 | RESPIRATION RATE: 16 BRPM | SYSTOLIC BLOOD PRESSURE: 124 MMHG | TEMPERATURE: 98.3 F

## 2019-10-31 DIAGNOSIS — N32.0 BLADDER OUTFLOW OBSTRUCTION: ICD-10-CM

## 2019-10-31 DIAGNOSIS — Z01.818 PREOP GENERAL PHYSICAL EXAM: Primary | ICD-10-CM

## 2019-10-31 DIAGNOSIS — N18.4 CHRONIC KIDNEY DISEASE, STAGE IV (SEVERE) (H): ICD-10-CM

## 2019-10-31 DIAGNOSIS — I10 ESSENTIAL HYPERTENSION, MALIGNANT: ICD-10-CM

## 2019-10-31 LAB
ALBUMIN SERPL-MCNC: 3.5 G/DL (ref 3.4–5)
ANION GAP SERPL CALCULATED.3IONS-SCNC: 6 MMOL/L (ref 3–14)
BASOPHILS # BLD AUTO: 0 10E9/L (ref 0–0.2)
BASOPHILS NFR BLD AUTO: 0.4 %
BUN SERPL-MCNC: 50 MG/DL (ref 7–30)
CALCIUM SERPL-MCNC: 8.3 MG/DL (ref 8.5–10.1)
CHLORIDE SERPL-SCNC: 112 MMOL/L (ref 94–109)
CO2 SERPL-SCNC: 20 MMOL/L (ref 20–32)
CREAT SERPL-MCNC: 3.04 MG/DL (ref 0.66–1.25)
DIFFERENTIAL METHOD BLD: ABNORMAL
EOSINOPHIL # BLD AUTO: 0.3 10E9/L (ref 0–0.7)
EOSINOPHIL NFR BLD AUTO: 3.1 %
ERYTHROCYTE [DISTWIDTH] IN BLOOD BY AUTOMATED COUNT: 13.8 % (ref 10–15)
GFR SERPL CREATININE-BSD FRML MDRD: 21 ML/MIN/{1.73_M2}
GLUCOSE SERPL-MCNC: 84 MG/DL (ref 70–99)
HCT VFR BLD AUTO: 37.7 % (ref 40–53)
HGB BLD-MCNC: 12.3 G/DL (ref 13.3–17.7)
LYMPHOCYTES # BLD AUTO: 1.9 10E9/L (ref 0.8–5.3)
LYMPHOCYTES NFR BLD AUTO: 23.3 %
MCH RBC QN AUTO: 31.2 PG (ref 26.5–33)
MCHC RBC AUTO-ENTMCNC: 32.6 G/DL (ref 31.5–36.5)
MCV RBC AUTO: 96 FL (ref 78–100)
MONOCYTES # BLD AUTO: 0.6 10E9/L (ref 0–1.3)
MONOCYTES NFR BLD AUTO: 7.5 %
NEUTROPHILS # BLD AUTO: 5.4 10E9/L (ref 1.6–8.3)
NEUTROPHILS NFR BLD AUTO: 65.7 %
PHOSPHATE SERPL-MCNC: 3.4 MG/DL (ref 2.5–4.5)
PLATELET # BLD AUTO: 282 10E9/L (ref 150–450)
POTASSIUM SERPL-SCNC: 5.2 MMOL/L (ref 3.4–5.3)
PTH-INTACT SERPL-MCNC: 171 PG/ML (ref 18–80)
RBC # BLD AUTO: 3.94 10E12/L (ref 4.4–5.9)
SODIUM SERPL-SCNC: 138 MMOL/L (ref 133–144)
WBC # BLD AUTO: 8.2 10E9/L (ref 4–11)

## 2019-10-31 PROCEDURE — 99215 OFFICE O/P EST HI 40 MIN: CPT | Performed by: INTERNAL MEDICINE

## 2019-10-31 PROCEDURE — 85025 COMPLETE CBC W/AUTO DIFF WBC: CPT | Performed by: INTERNAL MEDICINE

## 2019-10-31 PROCEDURE — 82306 VITAMIN D 25 HYDROXY: CPT | Performed by: INTERNAL MEDICINE

## 2019-10-31 PROCEDURE — 83970 ASSAY OF PARATHORMONE: CPT | Performed by: INTERNAL MEDICINE

## 2019-10-31 PROCEDURE — 93000 ELECTROCARDIOGRAM COMPLETE: CPT | Performed by: INTERNAL MEDICINE

## 2019-10-31 PROCEDURE — 36415 COLL VENOUS BLD VENIPUNCTURE: CPT | Performed by: INTERNAL MEDICINE

## 2019-10-31 PROCEDURE — 80069 RENAL FUNCTION PANEL: CPT | Performed by: INTERNAL MEDICINE

## 2019-10-31 NOTE — PROGRESS NOTES
[address] [address]  [phone]  Dept: [phone]    PRE-OP EVALUATION:  Today's date: 10/31/2019    Ras Esparza (: 1955) presents for pre-operative evaluation assessment as requested by Dr. Germain.  He requires evaluation and anesthesia risk assessment prior to undergoing surgery/procedure for treatment of Prostate .    [facility]   Primary Physician: Krzysztof Thakur  Type of Anesthesia Anticipated: General    Patient has a Health Care Directive or Living Will:  NO    Preop Questions 10/31/2019   Who is doing your surgery? Dr Germain   What are you having done? turp   Date of Surgery/Procedure: 2019 AM   Facility or Hospital where procedure[facility]surgery [facility] performed[facility]   1.  Do you have a history of Heart attack, stroke, stent, coronary bypass surgery, or other heart surgery? No   2.  Do you ever have any pain or discomfort in your chest? No   3.  Do you have a history of  Heart Failure? Yes. See HPI below   4.   Are you troubled by shortness of breath when:  walking on a level surface, or up a slight hill, or at night? No   5.  Do you currently have a cold, bronchitis or other respiratory infection? No   6.  Do you have a cough, shortness of breath, or wheezing? No   7.  Do you sometimes get pains in the calves of your legs when you walk? No   8. Do you or anyone in your family have previous history of blood clots? No   9.  Do you or does anyone in your family have a serious bleeding problem such as prolonged bleeding following surgeries or cuts? No   10. Have you ever had problems with anemia or been told to take iron pills? YES -  Related to CKD   11. Have you had any abnormal blood loss such as black, tarry or bloody stools? No   12. Have you ever had a blood transfusion? No   13. Have you or any of your relatives ever had problems with anesthesia? No   14. Do you have sleep apnea, excessive snoring or  daytime drowsiness? No   15. Do you have any prosthetic heart valves? No   16. Do you have prosthetic joints? No         HPI:     HPI related to upcoming procedure:  Pt was admitted to the hospital on 6/21/2019 with acute kidney injury secondary to bladder outlet obstruction, with initial creatinine 9.59. Urology and Nephrology were consulted to manage his obstruction and renal failure. After placement of Mayberry, he had postobstructive diuresis and kidney function hs been slowly improving with most recent creatinine 3.04 with GFR 2. At time of admission, pt was also found to be in heart failure with  Global hypokinesis and EF 30%. Angiogram could not be done due to renal function. Pt later underwent  a NM stress test of the heart  In early October 2019 that showed a  partially reversible inferior and basal inferolateral  defect. The small, reversible basal inferolateral defect suggested mild ischemia in the circumflex distribution and this has been treated medically with the patient's EF most recently improving to 46%. Pt continues to use a Mayberry catheter and now presents for clearance to undergo a TURP to correct the HOLDER issue that contributed to his original ARF.   Pt currently denies chest pain or shortness of breath with usual walking activity during the day. Able to go up stairs and do yard work without chest pain or shortness of breath. Denies orthopnea, PND. See below for other medical issues  BP now controlled with meds. Former smoker and quit in February 2019         MEDICAL HISTORY:     Patient Active Problem List    Diagnosis Date Noted     Benign essential hypertension 11/05/2019     Priority: Medium     Hyperlipidemia LDL goal <70 08/25/2019     Priority: Medium     Cardiomyopathy, unspecified type (H) 07/02/2019     Priority: Medium     Bladder outflow obstruction 07/02/2019     Priority: Medium     CKD (chronic kidney disease) stage 4, GFR 15-29 ml/min (H) 07/02/2019     Priority: Medium     Anemia,  unspecified type 2019     Priority: Medium      Past Medical History:   Diagnosis Date     Anemia, unspecified type 2019     Benign essential hypertension 2019     Bladder outflow obstruction 2019     Cardiomyopathy, unspecified type (H) 2019     CKD (chronic kidney disease) stage 4, GFR 15-29 ml/min (H) 2019     Hyperlipidemia LDL goal <70 2019     Tobacco abuse      No past surgical history on file.  Current Outpatient Medications   Medication Sig Dispense Refill     acetaminophen (TYLENOL) 325 MG tablet Take 2 tablets (650 mg) by mouth every 4 hours as needed for mild pain 60 tablet 0     aspirin (ASA) 81 MG chewable tablet Take 1 tablet (81 mg) by mouth daily 30 tablet 11     atorvastatin (LIPITOR) 40 MG tablet Take 0.5 tablets (20 mg) by mouth every evening 90 tablet 3     carvedilol (COREG) 12.5 MG tablet Take 1 tablet (12.5 mg) by mouth 2 times daily (with meals) 180 tablet 3     Ferrous Gluconate 240 (27 Fe) MG TABS Take by mouth daily       hydrALAZINE (APRESOLINE) 50 MG tablet Take 1 tablet (50 mg) by mouth 3 times daily 270 tablet 3     isosorbide mononitrate (IMDUR) 30 MG 24 hr tablet Take 1 tablet (30 mg) by mouth daily 90 tablet 3     magnesium oxide (MAG-OX) 400 MG tablet Take 1 tablet (400 mg) by mouth 2 times daily 60 tablet 0     OTC products: None, except as noted above    No Known Allergies   Latex Allergy: NO    Social History     Tobacco Use     Smoking status: Former Smoker     Packs/day: 0.50     Years: 30.00     Pack years: 15.00     Types: Cigarettes     Last attempt to quit: 2019     Years since quittin.4     Smokeless tobacco: Never Used     Tobacco comment: 12 cigarettes per day   Substance Use Topics     Alcohol use: Not Currently     Alcohol/week: 0.0 standard drinks     Comment: quit in 2018.      History   Drug Use Unknown       REVIEW OF SYSTEMS:   CONSTITUTIONAL: NEGATIVE for fever, chills. Weight up 20 pounds after 60 pound weight  loss  INTEGUMENTARY/SKIN: NEGATIVE for worrisome rashes, moles or lesions  EYES: NEGATIVE for vision changes or irritation  ENT/MOUTH: NEGATIVE for ear, mouth and throat problems  RESP: NEGATIVE for significant cough or SOB  CV: NEGATIVE for chest pain, palpitations. Mild BLE peripheral edema. NO orthopnea, PND  GI: NEGATIVE for nausea, abdominal pain, heartburn. Rare looser stool. OK today  :  POSITIVE for hx BPH. Has catheter. Hx CKD4 now and improving  MUSCULOSKELETAL: NEGATIVE for significant arthralgias or myalgia  NEURO: NEGATIVE for weakness, dizziness or paresthesias  ENDOCRINE: NEGATIVE for temperature intolerance. On statin  HEME: NEGATIVE for bleeding problems  PSYCHIATRIC: NEGATIVE for changes in mood or affect    EXAM:   /78   Pulse 63   Temp 98.3  F (36.8  C) (Temporal)   Resp 16   Wt 103.4 kg (228 lb)   SpO2 99%   BMI 31.36 kg/m       HENT: ear canals and TM's normal and nose and mouth without ulcers or lesions   Neck: no adenopathy. Thyroid normal to palpation. No bruits  Pulm: Lungs clear to auscultation   CV: Regular rates and rhythm  GI: Soft, nontender, Normal active bowel sounds, No hepatosplenomegaly or masses palpable  Ext: Peripheral pulses intact. Mild BLE edema.  Neuro: Normal strength and tone, sensory exam grossly normal  : Mayberry catheter in place    DIAGNOSTICS:   EKG:  EKG from 10/31/19 showed Sinus bradycardia with rate 59. T wave inversion in lead AVL. No other acute ST/T changes    Recent Labs   Lab Test 09/30/19  1001 07/12/19  1129  06/22/19  0730  06/21/19  1114   HGB 13.2* 10.2*   < > 9.0*  --  8.9*   PLT  --   --   --  225  --  228    136   < > 142   < > 142   POTASSIUM 5.0 5.0   < > 4.4   < > 4.7   CR 3.09* 4.13*   < > 7.50*   < > 8.94*    < > = values in this interval not displayed.      Component      Latest Ref Rng & Units 10/31/2019   WBC      4.0 - 11.0 10e9/L 8.2   RBC Count      4.4 - 5.9 10e12/L 3.94 (L)   Hemoglobin      13.3 - 17.7 g/dL 12.3 (L)    Hematocrit      40.0 - 53.0 % 37.7 (L)   MCV      78 - 100 fl 96   MCH      26.5 - 33.0 pg 31.2   MCHC      31.5 - 36.5 g/dL 32.6   RDW      10.0 - 15.0 % 13.8   Platelet Count      150 - 450 10e9/L 282   % Neutrophils      % 65.7   % Lymphocytes      % 23.3   % Monocytes      % 7.5   % Eosinophils      % 3.1   % Basophils      % 0.4   Absolute Neutrophil      1.6 - 8.3 10e9/L 5.4   Absolute Lymphocytes      0.8 - 5.3 10e9/L 1.9   Absolute Monocytes      0.0 - 1.3 10e9/L 0.6   Absolute Eosinophils      0.0 - 0.7 10e9/L 0.3   Absolute Basophils      0.0 - 0.2 10e9/L 0.0   Diff Method       Automated Method   Sodium      133 - 144 mmol/L 138   Potassium      3.4 - 5.3 mmol/L 5.2   Chloride      94 - 109 mmol/L 112 (H)   Carbon Dioxide      20 - 32 mmol/L 20   Anion Gap      3 - 14 mmol/L 6   Glucose      70 - 99 mg/dL 84   Urea Nitrogen      7 - 30 mg/dL 50 (H)   Creatinine      0.66 - 1.25 mg/dL 3.04 (H)   GFR Estimate      >60 mL/min/1.73:m2 21 (L)   GFR Estimate If Black      >60 mL/min/1.73:m2 24 (L)   Calcium      8.5 - 10.1 mg/dL 8.3 (L)   Phosphorus      2.5 - 4.5 mg/dL 3.4   Albumin      3.4 - 5.0 g/dL 3.5       GATED MYOCARDIAL PERFUSION SCINTIGRAPHY WITH INTRAVENOUS PHARMACOLOGIC  VASODILATATION LEXISCAN -ONE DAY STUDY      10/3/2019 2:43 PM  MARIFER DONIS  64 years  Male  1955.     Indication/Clinical History: Cardiomyopathy     Impression  1.  Myocardial perfusion imaging using single isotope technique  demonstrated partially reversible inferior and basal inferolateral  defect.   The small, reversible basal inferolateral defect suggests mild  ischemia in the circumflex distribution.   The fixed inferior defect is likely due to diaphragmatic attenuation.   2. Gated images demonstrated mildly dilated left ventricle with  borderline Global hypokinesis.  The left ventricular systolic function  is mildly reduced at 40% post stress.    ECHO 10/15/19:  Interpretation Summary     Left ventricular systolic  function is mildly reduced.  LVEF 46% based on biplane 2D tracing. Mild global hypokinesia of the left  ventricle.  The right ventricle is normal in structure, function and size.  No significant valvular abnormalities.  Mild aortic root dilatation. Max diameter of the visualized portion 4.2 cm.     This study was compared to a prior TTE from 6/22/2019. Global left ventricular  systolic funtion has improved. The aortic root is stable in size, previously  4.3cm, mildly dilated. The ascending aorta was previously measured at 4.1cm,  however it was measured at 3.7cm (within normal limits) on this present study.    IMPRESSION:   Reason for surgery/procedure:  Bladder outlet obstriction  Diagnosis/reason for consult:   1. CKD 4 due to bladder outlet obstruction - improving  2. HTN - stable  3. Cardiomyopathy of unclear etiology - some ischemia seen in circumflex branch but would not explain all the global hypokinesis. On medical management and improved EF  4. CAD - based on stress test results. No previous angio done due to CKD4  5. Anemia due to CKD - Hgb 12.3. Denies seeing blood in stools  6. Hyperlipidemia - on statin    The proposed surgical procedure is considered INTERMEDIATE risk.    REVISED CARDIAC RISK INDEX  The patient has the following serious cardiovascular risks for perioperative complications such as (MI, PE, VFib and 3  AV Block):  Coronary Artery Disease (MI, positive stress test, angina, Qs on EKG)  Congestive Heart Failure (pulmonary edema, PND, s3 kiran, CXR with pulmonary congestion, basilar rales)  Serum Creatinine >2.0 mg/dl  INTERPRETATION: 3 risks: Class IV (high risk - >11% complication rate)    The patient has the following additional risks for perioperative complications:  No identified additional risks         RECOMMENDATIONS:       Cardiovascular Risk  Performs 4 METs exercise without symptoms(able to go up stairs and yard work with no difficulty)    Anemia  Anemia and does not require  treatment prior to surgery.  Monitor Hemoglobin postoperatively.        Anticoagulant or Antiplatelet Medication Use  ASPIRIN: Discontinue ASA 7-10 days prior to procedure to reduce bleeding risk.  It should be resumed post-operatively.        APPROVAL GIVEN to proceed with proposed procedure, without further diagnostic evaluation as pt already on full medical management     Nothing to eat/drink after midnight prior to surgery except take Carvedilol, Hydralazine and Isosorbide on the AM of surgery with small sip water  Hold Aspirin 1 week before surgery and then restart again after surgery    Signed Electronically by: Krzysztof Thakur MD    Copy of this evaluation report is provided to requesting physician.    [place] Preop Guidelines    Revised Cardiac Risk Index

## 2019-10-31 NOTE — PROGRESS NOTES
Received strips from [facility]:    10/23/19 13:13:38 SR, symptom other than listed, light activity, HR 65    10/23/19 20:57:11 SR, symptom other than listed, light activity, HR 65    10/23/19 22:22:36 SR, no symptoms indicated, baseline recording, HR 75    Sent to file.

## 2019-11-01 LAB — DEPRECATED CALCIDIOL+CALCIFEROL SERPL-MC: 35 UG/L (ref 20–75)

## 2019-11-05 ENCOUNTER — OFFICE VISIT (OUTPATIENT)
Dept: CARDIOLOGY | Facility: CLINIC | Age: 64
End: 2019-11-05
Attending: INTERNAL MEDICINE
Payer: COMMERCIAL

## 2019-11-05 VITALS
HEART RATE: 66 BPM | SYSTOLIC BLOOD PRESSURE: 140 MMHG | HEIGHT: 72 IN | WEIGHT: 228.6 LBS | BODY MASS INDEX: 30.96 KG/M2 | DIASTOLIC BLOOD PRESSURE: 80 MMHG

## 2019-11-05 DIAGNOSIS — I42.9 CARDIOMYOPATHY, UNSPECIFIED TYPE (H): ICD-10-CM

## 2019-11-05 DIAGNOSIS — I10 BENIGN ESSENTIAL HYPERTENSION: ICD-10-CM

## 2019-11-05 DIAGNOSIS — N18.4 CKD (CHRONIC KIDNEY DISEASE) STAGE 4, GFR 15-29 ML/MIN (H): ICD-10-CM

## 2019-11-05 DIAGNOSIS — I50.22 CHRONIC SYSTOLIC HEART FAILURE (H): Primary | ICD-10-CM

## 2019-11-05 PROCEDURE — 99214 OFFICE O/P EST MOD 30 MIN: CPT | Performed by: NURSE PRACTITIONER

## 2019-11-05 RX ORDER — HYDRALAZINE HYDROCHLORIDE 50 MG/1
75 TABLET, FILM COATED ORAL 3 TIMES DAILY
Qty: 270 TABLET | Refills: 3 | Status: SHIPPED | OUTPATIENT
Start: 2019-11-05 | End: 2019-12-10

## 2019-11-05 ASSESSMENT — MIFFLIN-ST. JEOR: SCORE: 1866.98

## 2019-11-05 NOTE — PATIENT INSTRUCTIONS
1. INCREASE hydralazine to 75mg 3 x a day (1.5 tablets 3 x a day)  2. Start monitoring blood pressure at home, bring log to clinic visit  3. Please call with any additional questions/concerns [phone]  4. Follow up with Raquel in 1 month

## 2019-11-05 NOTE — LETTER
11/5/2019    Krzysztof Thakur MD  [address] [facility] [address]    RE: Ras Esparza       Dear Colleague,    I had the pleasure of seeing Ras Esparza in the [facility].    Cardiology Clinic Progress Note  Ras Esparza MRN# [id number]   YOB: 1955 Age: 64 year old   Primary Cardiologist: Dr. Adam  Reason for visit: cardiology follow up            Assessment and Plan:   Ras Esparza is a very pleasant 64 year old male with history of hypertension and tobacco use, who has not sought out routine care, recently hospitalizated 6/21/19-6/25/19 where he was diagnosed with HFrEF (likely ischemic), hypertension, acute kidney injury secondary to bladder outlet obstruction with bilateral hydronephrosis and hydroureter and anemia. Patient here today for cardiology follow up.     1.  Chronic systolic heart failure/HFrEF - Echo 10/15 shows improvement in LV function, EF 30% -> 46%, LV size normal, RV size and function. Etiology of cardiomyopathy unknown nuclear stress test completed which showed mild ischemia, no anginal symptoms, plan for continued medical management. Patient has been on optimal HF therapy. No ACEI/ARB or aldactone antagonist due to renal failure. Patient appears compensated and euvolemic today, despite increase in weight which appears related to increased caloric intake now that patient is feeling better. Patient is not in clinical HF on exam. Plan to further titrate hydralazine today given elevated blood pressure and continued cardiology follow up.    - NYHA class II, stage C   - Etiology : unknown, ? CAD/ICM mild ischemia on nuclear stress test, no anginal symptoms.    - Fluid status : euvolemic    - Diuretic regimen : none   - Last RHC : none   - Ischemic evaluation : Nuclear stress test showed mild ischemia, given no anginal symptoms and elevated creatinine decision made to continue medical management.    - Guideline directed medical therapy    -  Betablocker: carvedilol 12.5mg BID    - ACEI/ARB/ARNI: none r/t LEIGHA/CKD    - Aldactone antagonist: none r/t LEIGHA/CKD    - Continue isosorbide mononitrate 30mg daily    - INCREASE hydralazine 75mg TID   - Sudden cardiac death prophylaxis : N/A EF > 35%   - Reinforced HF education today, reviewed low sodium diet, reviewed when to notify clinic.     2. Acute on chronic renal failure - secondary to bladder outlet obstruction with bilateral hydronephrosis and hydroureter, his initial creatinine was 9.59. Creatinine improved to 3.04 10/31/19 without needing dialysis.    - Following with nephrology    3. Hypertension - elevated, /80s today in clinic. Patient notes he recently got BP cuff and plans to start monitoring at home.    - INCREASE hydralazine to 75mg TID   - Continue carvedilol (limited room to increase given heart rate)   - Advised patient to bring BP log to follow up appointment   - Counseled patient on lifestyle modifications to help manage BP    4. Former tobacco use - quit tobacco use February 2019. Has remained smoking cessation.         Changes today: INCREASE hydralazine 75mg TID    Follow up plan:     Follow up with me 1 month to review home blood pressures, sooner as needed         History of Presenting Illness:    Ras Esparza is a very pleasant 64 year old male with history of hypertension and tobacco use, who has not sought out routine care, recently hospitalization where he was diagnosed with HFrEF (likely ischemic), hypertension, acute kidney injury secondary to bladder outlet obstruction with bilateral hydronephrosis and hydroureter and anemia.     Patient hospitalized 6/21/19-6/25/19 related to acute kidney injury secondary to bladder outlet obstruction with bilateral hydronephrosis and hydroureter, his initial creatinine was 9.59. Creatinine improved to 4.73 6/28/19 without needing dialysis. NTproBNP 45,492. Complaints of worsening exertional dyspnea, lower extremity edema and one episode  of typical chest pain/diaphoresis lasting 2hrs approx 1-2 months ago. Echocardiogram shows EF 30% with severe global hypokinesis, LV moderately dilated, RV normal size/function, no significant valvular disease. Etiology of cardiomyopathy unknown at this time, ischemic cardiomyopathy not excluded. Coronary angiogram not pursued during hospital stay due to LEIGHA. Patient started on carvedilol, hydralazine and imdur. No ACEI/ARB or aldactone antagonist due to renal failure. Vascular calcifications noted on CT of abdomen/pelvis- started on statin therapy. Admission weight 256# Discharge weight 237#. Patient was discharged with milligan catheter and follow up with urology.      Patient was seen by Dr. Adam 9/30/19 at which point he was having no ischemic symptoms, clinically not in HF, did note presyncopal episodes for which event monitor was placed. Consideration for coronary angiogram was reviewed and ultimately decided to proceed with nuclear stress test. Blood pressure was elevated and hydralazine was increased. Echocardiogram and nuclear stress ordered.     Nuclear stress test 10/3 demonstrated partially reversible inferior and basal inferolateral defect. Small, reversible basal inferolateral defect suggests mild ischemia in the circumflex distribution. Dr. Adam recommends continued medical management given mild basal inferolateral ischemia, no anginal symptoms, and continued elevated creatinine. Echocardiogram 10/15 showed improvement in LV function with an EF of 46%, RV normal size and function, no significant valvular disease, mild aortic root dilatation (stable). Event monitor currently in place, reviewed available strips which all show sinus rhythm.     Patient is here today for cardiology follow up.     Patient reports feeling good. Weight has been increasing but patient notes since feeling better significant improvement appetite. Energy levels are good, which is an improvement. Patient denies chest pain or chest  tightness. Denies shortness of breath at rest. Denies exertional dyspnea, able to go up stairs and yard work with no difficulty. Denies lower extremity edema. Denies abdominal distention/bloating. Sleeping good. Denies orthopnea or PND. Denies dizziness, lightheadedness or other presyncopal symptoms, notes no recurrent symptoms. Denies tachycardia or palpitations. Denies episodes of bleeding. States took his medications about 30 mins ago. States he just got a cuff and plans to start monitoring. Scheduled for TURP on 11/21/19.     Labs from 10/31/19 show stable kidney function, creatinine 3.04, stable electrolytes. Hemoglobin 12.3. Blood pressure 147/83, recheck 140/80 and HR 66 in clinic today.    Appetite good. Following renal diet closely. Limiting sodium intake. Eating meals at home, bringing lunch to work. Drinking 3 large cups coffee daily. No set exercise routine, getting activities with ADLs, has been raking/mulching leaves. Hope to join [employer] club this next year after his urologic procedure. Denies alcohol use - quit > 1 year ago. Quit tobacco use February 2019. Has resumed working in IT.         Recent Hospitalizations   6/21/19-6/25/19 related to acute kidney injury secondary to bladder outlet obstruction with bilateral hydronephrosis and hydroureter, his initial creatinine was 9.59. Creatinine improved to 4.73 6/28/19 without needing dialysis. NTproBNP 45,492. New diagnosis of cardiomyopathy with LVEF 30%.         Social History    , 1 daughter, on disability. Enjoys fishing. Enjoy the outdoors.   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IT)   Social Needs     Financial resource strain: Not on file     Food insecurity:     Worry: Not on file     Inability: Not on file     Transportation  "needs:     Medical: Not on file     Non-medical: Not on file   Tobacco Use     Smoking status: Former Smoker     Packs/day: 0.50     Years: 30.00     Pack years: 15.00     Types: Cigarettes     Last attempt to quit: 2019     Years since quittin.4     Smokeless tobacco: Never Used     Tobacco comment: 12 cigarettes per day   Substance and Sexual Activity     Alcohol use: Not Currently     Alcohol/week: 0.0 standard drinks     Comment: quit in 2018.      Drug use: Never     Sexual activity: Yes     Partners: Female   Lifestyle     Physical activity:     Days per week: Not on file     Minutes per session: Not on file     Stress: Not on file   Relationships     Social connections:     Talks on phone: Not on file     Gets together: Not on file     Attends Yazidi service: Not on file     Active member of club or organization: Not on file     Attends meetings of clubs or organizations: Not on file     Relationship status: Not on file     Intimate partner violence:     Fear of current or ex partner: Not on file     Emotionally abused: Not on file     Physically abused: Not on file     Forced sexual activity: Not on file   Other Topics Concern     Parent/sibling w/ CABG, MI or angioplasty before 65F 55M? Not Asked   Social History Narrative     Not on file            Review of Systems:   Skin:  Negative     Eyes:  Positive for glasses  ENT:  Negative    Respiratory:  Negative    Cardiovascular:  Negative    Gastroenterology: Negative    Genitourinary:  Positive for    Musculoskeletal:  Negative    Neurologic:  Negative    Psychiatric:  Negative    Heme/Lymph/Imm:  Negative    Endocrine:  Negative           Physical Exam:   Vitals: BP (!) 147/83   Pulse 66   Ht 1.816 m (5' 11.5\")   Wt 103.7 kg (228 lb 9.6 oz)   BMI 31.44 kg/m      Wt Readings from Last 4 Encounters:   19 103.7 kg (228 lb 9.6 oz)   10/31/19 103.4 kg (228 lb)   19 100 kg (220 lb 6.4 oz)   19 93.9 kg (207 lb)     GEN: well " nourished, in no acute distress.  HEENT:  Pupils equal, round. Sclerae nonicteric.   NECK: Supple, no masses appreciated. No JVD appreciated on exam.   C/V:  Regular rate and rhythm, no murmur, rub or gallop.    RESP: Respirations are unlabored. Clear to auscultation bilaterally without wheezing, rales, or rhonchi.  GI: Abdomen soft, nontender.  : milligan catheter in place  EXTREM: No LE edema.  NEURO: Alert and oriented, cooperative.  SKIN: Warm and dry.        Data:   ECHO 10/15/19  Left ventricular systolic function is mildly reduced.  LVEF 46% based on biplane 2D tracing. Mild global hypokinesia of the left  ventricle.  The right ventricle is normal in structure, function and size.  No significant valvular abnormalities.  Mild aortic root dilatation. Max diameter of the visualized portion 4.2 cm.     This study was compared to a prior TTE from 6/22/2019. Global left ventricular  systolic funtion has improved. The aortic root is stable in size, previously  4.3cm, mildly dilated. The ascending aorta was previously measured at 4.1cm,  however it was measured at 3.7cm (within normal limits) on this present study.    Cardiac Event monitor - still in place    Nuclear stress test 10/3/2019  1.  Myocardial perfusion imaging using single isotope technique  demonstrated partially reversible inferior and basal inferolateral  defect.   The small, reversible basal inferolateral defect suggests mild  ischemia in the circumflex distribution.   The fixed inferior defect is likely due to diaphragmatic attenuation.   2. Gated images demonstrated mildly dilated left ventricle with  borderline Global hypokinesis.  The left ventricular systolic function  is mildly reduced at 40% post stress.  3. Compared to the prior study from  .      LIVER ENZYME RESULTS:  Lab Results   Component Value Date    AST 15 06/21/2019    ALT 38 06/21/2019     CBC RESULTS:  Lab Results   Component Value Date    WBC 8.2 10/31/2019    RBC 3.94 (L)  10/31/2019    HGB 12.3 (L) 10/31/2019    HCT 37.7 (L) 10/31/2019    MCV 96 10/31/2019    MCH 31.2 10/31/2019    MCHC 32.6 10/31/2019    RDW 13.8 10/31/2019     10/31/2019     BMP RESULTS:  Lab Results   Component Value Date     10/31/2019    POTASSIUM 5.2 10/31/2019    CHLORIDE 112 (H) 10/31/2019    CO2 20 10/31/2019    ANIONGAP 6 10/31/2019    GLC 84 10/31/2019    BUN 50 (H) 10/31/2019    CR 3.04 (H) 10/31/2019    GFRESTIMATED 21 (L) 10/31/2019    GFRESTBLACK 24 (L) 10/31/2019    ELICIA 8.3 (L) 10/31/2019             Medications     Current Outpatient Medications   Medication Sig Dispense Refill     acetaminophen (TYLENOL) 325 MG tablet Take 2 tablets (650 mg) by mouth every 4 hours as needed for mild pain 60 tablet 0     aspirin (ASA) 81 MG chewable tablet Take 1 tablet (81 mg) by mouth daily 30 tablet 11     atorvastatin (LIPITOR) 40 MG tablet Take 0.5 tablets (20 mg) by mouth every evening 90 tablet 3     carvedilol (COREG) 12.5 MG tablet Take 1 tablet (12.5 mg) by mouth 2 times daily (with meals) 180 tablet 3     Ferrous Gluconate 240 (27 Fe) MG TABS Take by mouth daily       hydrALAZINE (APRESOLINE) 50 MG tablet Take 1 tablet (50 mg) by mouth 3 times daily 270 tablet 3     isosorbide mononitrate (IMDUR) 30 MG 24 hr tablet Take 1 tablet (30 mg) by mouth daily 90 tablet 3     magnesium oxide (MAG-OX) 400 MG tablet Take 1 tablet (400 mg) by mouth 2 times daily 60 tablet 0          Past Medical History     Past Medical History:   Diagnosis Date     Hyperlipidemia LDL goal <70 8/25/2019     Tobacco abuse      History reviewed. No pertinent surgical history.  Family History   Problem Relation Age of Onset     Glaucoma Mother      Throat cancer Father      Rheumatoid Arthritis Sister      Alcoholism Brother      Liver Disease Brother             Allergies   Patient has no known allergies.        MAGNOLIA Sheikh CNP  [facility]  Pager: [phone]      Thank you for allowing me to participate in the  care of your patient.    Sincerely,     MAGNOLIA Sheikh [facility]

## 2019-11-05 NOTE — PROGRESS NOTES
Cardiology Clinic Progress Note  Ras Esparza MRN# [id number]   YOB: 1955 Age: 64 year old   Primary Cardiologist: Dr. Adam  Reason for visit: cardiology follow up            Assessment and Plan:   Ras Esparza is a very pleasant 64 year old male with history of hypertension and tobacco use, who has not sought out routine care, recently hospitalizated 6/21/19-6/25/19 where he was diagnosed with HFrEF (likely ischemic), hypertension, acute kidney injury secondary to bladder outlet obstruction with bilateral hydronephrosis and hydroureter and anemia. Patient here today for cardiology follow up.     1.  Chronic systolic heart failure/HFrEF - Echo 10/15 shows improvement in LV function, EF 30% -> 46%, LV size normal, RV size and function. Etiology of cardiomyopathy unknown nuclear stress test completed which showed mild ischemia, no anginal symptoms, plan for continued medical management. Patient has been on optimal HF therapy. No ACEI/ARB or aldactone antagonist due to renal failure. Patient appears compensated and euvolemic today, despite increase in weight which appears related to increased caloric intake now that patient is feeling better. Patient is not in clinical HF on exam. Plan to further titrate hydralazine today given elevated blood pressure and continued cardiology follow up.    - NYHA class II, stage C   - Etiology : unknown, ? CAD/ICM mild ischemia on nuclear stress test, no anginal symptoms.    - Fluid status : euvolemic    - Diuretic regimen : none   - Last RHC : none   - Ischemic evaluation : Nuclear stress test showed mild ischemia, given no anginal symptoms and elevated creatinine decision made to continue medical management.    - Guideline directed medical therapy    - Betablocker: carvedilol 12.5mg BID    - ACEI/ARB/ARNI: none r/t LEIGHA/CKD    - Aldactone antagonist: none r/t LEIGHA/CKD    - Continue isosorbide mononitrate 30mg daily    - INCREASE hydralazine 75mg TID   - Sudden  cardiac death prophylaxis : N/A EF > 35%   - Reinforced HF education today, reviewed low sodium diet, reviewed when to notify clinic.     2. Acute on chronic renal failure - secondary to bladder outlet obstruction with bilateral hydronephrosis and hydroureter, his initial creatinine was 9.59. Creatinine improved to 3.04 10/31/19 without needing dialysis.    - Following with nephrology    3. Hypertension - elevated, /80s today in clinic. Patient notes he recently got BP cuff and plans to start monitoring at home.    - INCREASE hydralazine to 75mg TID   - Continue carvedilol (limited room to increase given heart rate)   - Advised patient to bring BP log to follow up appointment   - Counseled patient on lifestyle modifications to help manage BP    4. Former tobacco use - quit tobacco use February 2019. Has remained smoking cessation.         Changes today: INCREASE hydralazine 75mg TID    Follow up plan:     Follow up with me 1 month to review home blood pressures, sooner as needed         History of Presenting Illness:    Ras Esparza is a very pleasant 64 year old male with history of hypertension and tobacco use, who has not sought out routine care, recently hospitalization where he was diagnosed with HFrEF (likely ischemic), hypertension, acute kidney injury secondary to bladder outlet obstruction with bilateral hydronephrosis and hydroureter and anemia.     Patient hospitalized 6/21/19-6/25/19 related to acute kidney injury secondary to bladder outlet obstruction with bilateral hydronephrosis and hydroureter, his initial creatinine was 9.59. Creatinine improved to 4.73 6/28/19 without needing dialysis. NTproBNP 45,492. Complaints of worsening exertional dyspnea, lower extremity edema and one episode of typical chest pain/diaphoresis lasting 2hrs approx 1-2 months ago. Echocardiogram shows EF 30% with severe global hypokinesis, LV moderately dilated, RV normal size/function, no significant valvular  disease. Etiology of cardiomyopathy unknown at this time, ischemic cardiomyopathy not excluded. Coronary angiogram not pursued during hospital stay due to LEIGHA. Patient started on carvedilol, hydralazine and imdur. No ACEI/ARB or aldactone antagonist due to renal failure. Vascular calcifications noted on CT of abdomen/pelvis- started on statin therapy. Admission weight 256# Discharge weight 237#. Patient was discharged with milligan catheter and follow up with urology.      Patient was seen by Dr. Adam 9/30/19 at which point he was having no ischemic symptoms, clinically not in HF, did note presyncopal episodes for which event monitor was placed. Consideration for coronary angiogram was reviewed and ultimately decided to proceed with nuclear stress test. Blood pressure was elevated and hydralazine was increased. Echocardiogram and nuclear stress ordered.     Nuclear stress test 10/3 demonstrated partially reversible inferior and basal inferolateral defect. Small, reversible basal inferolateral defect suggests mild ischemia in the circumflex distribution. Dr. Adam recommends continued medical management given mild basal inferolateral ischemia, no anginal symptoms, and continued elevated creatinine. Echocardiogram 10/15 showed improvement in LV function with an EF of 46%, RV normal size and function, no significant valvular disease, mild aortic root dilatation (stable). Event monitor currently in place, reviewed available strips which all show sinus rhythm.     Patient is here today for cardiology follow up.     Patient reports feeling good. Weight has been increasing but patient notes since feeling better significant improvement appetite. Energy levels are good, which is an improvement. Patient denies chest pain or chest tightness. Denies shortness of breath at rest. Denies exertional dyspnea, able to go up stairs and yard work with no difficulty. Denies lower extremity edema. Denies abdominal distention/bloating.  Sleeping good. Denies orthopnea or PND. Denies dizziness, lightheadedness or other presyncopal symptoms, notes no recurrent symptoms. Denies tachycardia or palpitations. Denies episodes of bleeding. States took his medications about 30 mins ago. States he just got a cuff and plans to start monitoring. Scheduled for TURP on 11/21/19.     Labs from 10/31/19 show stable kidney function, creatinine 3.04, stable electrolytes. Hemoglobin 12.3. Blood pressure 147/83, recheck 140/80 and HR 66 in clinic today.    Appetite good. Following renal diet closely. Limiting sodium intake. Eating meals at home, bringing lunch to work. Drinking 3 large cups coffee daily. No set exercise routine, getting activities with ADLs, has been raking/mulching leaves. Hope to join health club this next year after his urologic procedure. Denies alcohol use - quit > 1 year ago. Quit tobacco use February 2019. Has resumed working in IT.         Recent Hospitalizations   6/21/19-6/25/19 related to acute kidney injury secondary to bladder outlet obstruction with bilateral hydronephrosis and hydroureter, his initial creatinine was 9.59. Creatinine improved to 4.73 6/28/19 without needing dialysis. NTproBNP 45,492. New diagnosis of cardiomyopathy with LVEF 30%.         Social History    , 1 daughter, on disability. Enjoys fishing. Enjoy the outdoors.   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IT)   Social Needs     Financial resource strain: Not on file     Food insecurity:     Worry: Not on file     Inability: Not on file     Transportation needs:     Medical: Not on file     Non-medical: Not on file   Tobacco Use     Smoking status: Former Smoker     Packs/day: 0.50     Years: 30.00     Pack years: 15.00     Types: Cigarettes     Last  "attempt to quit: 2019     Years since quittin.4     Smokeless tobacco: Never Used     Tobacco comment: 12 cigarettes per day   Substance and Sexual Activity     Alcohol use: Not Currently     Alcohol/week: 0.0 standard drinks     Comment: quit in 2018.      Drug use: Never     Sexual activity: Yes     Partners: Female   Lifestyle     Physical activity:     Days per week: Not on file     Minutes per session: Not on file     Stress: Not on file   Relationships     Social connections:     Talks on phone: Not on file     Gets together: Not on file     Attends Confucianist service: Not on file     Active member of club or organization: Not on file     Attends meetings of clubs or organizations: Not on file     Relationship status: Not on file     Intimate partner violence:     Fear of current or ex partner: Not on file     Emotionally abused: Not on file     Physically abused: Not on file     Forced sexual activity: Not on file   Other Topics Concern     Parent/sibling w/ CABG, MI or angioplasty before 65F 55M? Not Asked   Social History Narrative     Not on file            Review of Systems:   Skin:  Negative     Eyes:  Positive for glasses  ENT:  Negative    Respiratory:  Negative    Cardiovascular:  Negative    Gastroenterology: Negative    Genitourinary:  Positive for    Musculoskeletal:  Negative    Neurologic:  Negative    Psychiatric:  Negative    Heme/Lymph/Imm:  Negative    Endocrine:  Negative           Physical Exam:   Vitals: BP (!) 147/83   Pulse 66   Ht 1.816 m (5' 11.5\")   Wt 103.7 kg (228 lb 9.6 oz)   BMI 31.44 kg/m     Wt Readings from Last 4 Encounters:   19 103.7 kg (228 lb 9.6 oz)   10/31/19 103.4 kg (228 lb)   19 100 kg (220 lb 6.4 oz)   19 93.9 kg (207 lb)     GEN: well nourished, in no acute distress.  HEENT:  Pupils equal, round. Sclerae nonicteric.   NECK: Supple, no masses appreciated. No JVD appreciated on exam.   C/V:  Regular rate and rhythm, no murmur, rub or " gallop.    RESP: Respirations are unlabored. Clear to auscultation bilaterally without wheezing, rales, or rhonchi.  GI: Abdomen soft, nontender.  : milligan catheter in place  EXTREM: No LE edema.  NEURO: Alert and oriented, cooperative.  SKIN: Warm and dry.        Data:   ECHO 10/15/19  Left ventricular systolic function is mildly reduced.  LVEF 46% based on biplane 2D tracing. Mild global hypokinesia of the left  ventricle.  The right ventricle is normal in structure, function and size.  No significant valvular abnormalities.  Mild aortic root dilatation. Max diameter of the visualized portion 4.2 cm.     This study was compared to a prior TTE from 6/22/2019. Global left ventricular  systolic funtion has improved. The aortic root is stable in size, previously  4.3cm, mildly dilated. The ascending aorta was previously measured at 4.1cm,  however it was measured at 3.7cm (within normal limits) on this present study.    Cardiac Event monitor - still in place    Nuclear stress test 10/3/2019  1.  Myocardial perfusion imaging using single isotope technique  demonstrated partially reversible inferior and basal inferolateral  defect.   The small, reversible basal inferolateral defect suggests mild  ischemia in the circumflex distribution.   The fixed inferior defect is likely due to diaphragmatic attenuation.   2. Gated images demonstrated mildly dilated left ventricle with  borderline Global hypokinesis.  The left ventricular systolic function  is mildly reduced at 40% post stress.  3. Compared to the prior study from  .      LIVER ENZYME RESULTS:  Lab Results   Component Value Date    AST 15 06/21/2019    ALT 38 06/21/2019     CBC RESULTS:  Lab Results   Component Value Date    WBC 8.2 10/31/2019    RBC 3.94 (L) 10/31/2019    HGB 12.3 (L) 10/31/2019    HCT 37.7 (L) 10/31/2019    MCV 96 10/31/2019    MCH 31.2 10/31/2019    MCHC 32.6 10/31/2019    RDW 13.8 10/31/2019     10/31/2019     BMP RESULTS:  Lab Results    Component Value Date     10/31/2019    POTASSIUM 5.2 10/31/2019    CHLORIDE 112 (H) 10/31/2019    CO2 20 10/31/2019    ANIONGAP 6 10/31/2019    GLC 84 10/31/2019    BUN 50 (H) 10/31/2019    CR 3.04 (H) 10/31/2019    GFRESTIMATED 21 (L) 10/31/2019    GFRESTBLACK 24 (L) 10/31/2019    ELICIA 8.3 (L) 10/31/2019             Medications     Current Outpatient Medications   Medication Sig Dispense Refill     acetaminophen (TYLENOL) 325 MG tablet Take 2 tablets (650 mg) by mouth every 4 hours as needed for mild pain 60 tablet 0     aspirin (ASA) 81 MG chewable tablet Take 1 tablet (81 mg) by mouth daily 30 tablet 11     atorvastatin (LIPITOR) 40 MG tablet Take 0.5 tablets (20 mg) by mouth every evening 90 tablet 3     carvedilol (COREG) 12.5 MG tablet Take 1 tablet (12.5 mg) by mouth 2 times daily (with meals) 180 tablet 3     Ferrous Gluconate 240 (27 Fe) MG TABS Take by mouth daily       hydrALAZINE (APRESOLINE) 50 MG tablet Take 1 tablet (50 mg) by mouth 3 times daily 270 tablet 3     isosorbide mononitrate (IMDUR) 30 MG 24 hr tablet Take 1 tablet (30 mg) by mouth daily 90 tablet 3     magnesium oxide (MAG-OX) 400 MG tablet Take 1 tablet (400 mg) by mouth 2 times daily 60 tablet 0          Past Medical History     Past Medical History:   Diagnosis Date     Hyperlipidemia LDL goal <70 8/25/2019     Tobacco abuse      History reviewed. No pertinent surgical history.  Family History   Problem Relation Age of Onset     Glaucoma Mother      Throat cancer Father      Rheumatoid Arthritis Sister      Alcoholism Brother      Liver Disease Brother             Allergies   Patient has no known allergies.        MAGNOLIA [facility]  Pager: [phone]

## 2019-11-21 ENCOUNTER — HOSPITAL ENCOUNTER (OUTPATIENT)
Facility: CLINIC | Age: 64
Discharge: HOME | End: 2019-11-22
Attending: UROLOGY | Admitting: UROLOGY
Payer: COMMERCIAL

## 2019-11-21 ENCOUNTER — ANESTHESIA (OUTPATIENT)
Dept: SURGERY | Facility: CLINIC | Age: 64
End: 2019-11-21
Payer: COMMERCIAL

## 2019-11-21 ENCOUNTER — ANESTHESIA EVENT (OUTPATIENT)
Dept: SURGERY | Facility: CLINIC | Age: 64
End: 2019-11-21
Payer: COMMERCIAL

## 2019-11-21 DIAGNOSIS — R33.9 URINARY RETENTION: Primary | ICD-10-CM

## 2019-11-21 DIAGNOSIS — I42.9 CARDIOMYOPATHY, UNSPECIFIED TYPE (H): ICD-10-CM

## 2019-11-21 PROBLEM — R31.9 HEMATURIA: Status: ACTIVE | Noted: 2019-11-21

## 2019-11-21 LAB
ANION GAP SERPL CALCULATED.3IONS-SCNC: 6 MMOL/L (ref 3–14)
BUN SERPL-MCNC: 68 MG/DL (ref 7–30)
CALCIUM SERPL-MCNC: 8.8 MG/DL (ref 8.5–10.1)
CHLORIDE SERPL-SCNC: 116 MMOL/L (ref 94–109)
CO2 SERPL-SCNC: 20 MMOL/L (ref 20–32)
CREAT SERPL-MCNC: 3.38 MG/DL (ref 0.66–1.25)
GFR SERPL CREATININE-BSD FRML MDRD: 18 ML/MIN/{1.73_M2}
GLUCOSE SERPL-MCNC: 96 MG/DL (ref 70–99)
POTASSIUM SERPL-SCNC: 4.8 MMOL/L (ref 3.4–5.3)
SODIUM SERPL-SCNC: 142 MMOL/L (ref 133–144)

## 2019-11-21 PROCEDURE — 99214 OFFICE O/P EST MOD 30 MIN: CPT | Performed by: PHYSICIAN ASSISTANT

## 2019-11-21 PROCEDURE — 25000128 H RX IP 250 OP 636: Performed by: NURSE ANESTHETIST, CERTIFIED REGISTERED

## 2019-11-21 PROCEDURE — 37000008 ZZH ANESTHESIA TECHNICAL FEE, 1ST 30 MIN: Performed by: UROLOGY

## 2019-11-21 PROCEDURE — 25000128 H RX IP 250 OP 636: Performed by: PHYSICIAN ASSISTANT

## 2019-11-21 PROCEDURE — 25000566 ZZH SEVOFLURANE, EA 15 MIN: Performed by: UROLOGY

## 2019-11-21 PROCEDURE — 80048 BASIC METABOLIC PNL TOTAL CA: CPT | Performed by: ANESTHESIOLOGY

## 2019-11-21 PROCEDURE — 25000128 H RX IP 250 OP 636: Performed by: ANESTHESIOLOGY

## 2019-11-21 PROCEDURE — 25000125 ZZHC RX 250: Performed by: NURSE ANESTHETIST, CERTIFIED REGISTERED

## 2019-11-21 PROCEDURE — 40000170 ZZH STATISTIC PRE-PROCEDURE ASSESSMENT II: Performed by: UROLOGY

## 2019-11-21 PROCEDURE — 71000013 ZZH RECOVERY PHASE 1 LEVEL 1 EA ADDTL HR: Performed by: UROLOGY

## 2019-11-21 PROCEDURE — 37000009 ZZH ANESTHESIA TECHNICAL FEE, EACH ADDTL 15 MIN: Performed by: UROLOGY

## 2019-11-21 PROCEDURE — 25000125 ZZHC RX 250: Performed by: UROLOGY

## 2019-11-21 PROCEDURE — 36000063 ZZH SURGERY LEVEL 4 EA 15 ADDTL MIN: Performed by: UROLOGY

## 2019-11-21 PROCEDURE — 25000132 ZZH RX MED GY IP 250 OP 250 PS 637: Performed by: UROLOGY

## 2019-11-21 PROCEDURE — 27210794 ZZH OR GENERAL SUPPLY STERILE: Performed by: UROLOGY

## 2019-11-21 PROCEDURE — 36000093 ZZH SURGERY LEVEL 4 1ST 30 MIN: Performed by: UROLOGY

## 2019-11-21 PROCEDURE — 36415 COLL VENOUS BLD VENIPUNCTURE: CPT | Performed by: ANESTHESIOLOGY

## 2019-11-21 PROCEDURE — 71000012 ZZH RECOVERY PHASE 1 LEVEL 1 FIRST HR: Performed by: UROLOGY

## 2019-11-21 PROCEDURE — 25800025 ZZH RX 258: Performed by: UROLOGY

## 2019-11-21 PROCEDURE — 25800030 ZZH RX IP 258 OP 636: Performed by: NURSE ANESTHETIST, CERTIFIED REGISTERED

## 2019-11-21 RX ORDER — FENTANYL CITRATE 0.05 MG/ML
25-50 INJECTION, SOLUTION INTRAMUSCULAR; INTRAVENOUS
Status: DISCONTINUED | OUTPATIENT
Start: 2019-11-21 | End: 2019-11-21 | Stop reason: HOSPADM

## 2019-11-21 RX ORDER — HYDROMORPHONE HYDROCHLORIDE 1 MG/ML
0.2 INJECTION, SOLUTION INTRAMUSCULAR; INTRAVENOUS; SUBCUTANEOUS
Status: DISCONTINUED | OUTPATIENT
Start: 2019-11-21 | End: 2019-11-22 | Stop reason: HOSPADM

## 2019-11-21 RX ORDER — DEXAMETHASONE SODIUM PHOSPHATE 4 MG/ML
4 INJECTION, SOLUTION INTRA-ARTICULAR; INTRALESIONAL; INTRAMUSCULAR; INTRAVENOUS; SOFT TISSUE EVERY 10 MIN PRN
Status: DISCONTINUED | OUTPATIENT
Start: 2019-11-21 | End: 2019-11-21 | Stop reason: HOSPADM

## 2019-11-21 RX ORDER — LABETALOL HYDROCHLORIDE 5 MG/ML
10 INJECTION, SOLUTION INTRAVENOUS
Status: DISCONTINUED | OUTPATIENT
Start: 2019-11-21 | End: 2019-11-21 | Stop reason: HOSPADM

## 2019-11-21 RX ORDER — ONDANSETRON 2 MG/ML
INJECTION INTRAMUSCULAR; INTRAVENOUS PRN
Status: DISCONTINUED | OUTPATIENT
Start: 2019-11-21 | End: 2019-11-21

## 2019-11-21 RX ORDER — LIDOCAINE HYDROCHLORIDE 20 MG/ML
INJECTION, SOLUTION INFILTRATION; PERINEURAL PRN
Status: DISCONTINUED | OUTPATIENT
Start: 2019-11-21 | End: 2019-11-21

## 2019-11-21 RX ORDER — MEPERIDINE HYDROCHLORIDE 25 MG/ML
12.5 INJECTION INTRAMUSCULAR; INTRAVENOUS; SUBCUTANEOUS
Status: DISCONTINUED | OUTPATIENT
Start: 2019-11-21 | End: 2019-11-21 | Stop reason: HOSPADM

## 2019-11-21 RX ORDER — HYDROMORPHONE HYDROCHLORIDE 1 MG/ML
.3-.5 INJECTION, SOLUTION INTRAMUSCULAR; INTRAVENOUS; SUBCUTANEOUS EVERY 10 MIN PRN
Status: DISCONTINUED | OUTPATIENT
Start: 2019-11-21 | End: 2019-11-21 | Stop reason: HOSPADM

## 2019-11-21 RX ORDER — ONDANSETRON 4 MG/1
4 TABLET, ORALLY DISINTEGRATING ORAL EVERY 6 HOURS PRN
Status: DISCONTINUED | OUTPATIENT
Start: 2019-11-21 | End: 2019-11-22 | Stop reason: HOSPADM

## 2019-11-21 RX ORDER — ATROPA BELLADONNA AND OPIUM 16.2; 3 MG/1; MG/1
SUPPOSITORY RECTAL PRN
Status: DISCONTINUED | OUTPATIENT
Start: 2019-11-21 | End: 2019-11-21 | Stop reason: HOSPADM

## 2019-11-21 RX ORDER — NALOXONE HYDROCHLORIDE 0.4 MG/ML
.1-.4 INJECTION, SOLUTION INTRAMUSCULAR; INTRAVENOUS; SUBCUTANEOUS
Status: DISCONTINUED | OUTPATIENT
Start: 2019-11-21 | End: 2019-11-21 | Stop reason: HOSPADM

## 2019-11-21 RX ORDER — LIDOCAINE 40 MG/G
CREAM TOPICAL
Status: DISCONTINUED | OUTPATIENT
Start: 2019-11-21 | End: 2019-11-22 | Stop reason: HOSPADM

## 2019-11-21 RX ORDER — HYDRALAZINE HYDROCHLORIDE 20 MG/ML
2.5-5 INJECTION INTRAMUSCULAR; INTRAVENOUS EVERY 10 MIN PRN
Status: DISCONTINUED | OUTPATIENT
Start: 2019-11-21 | End: 2019-11-21 | Stop reason: HOSPADM

## 2019-11-21 RX ORDER — SODIUM CHLORIDE, SODIUM LACTATE, POTASSIUM CHLORIDE, CALCIUM CHLORIDE 600; 310; 30; 20 MG/100ML; MG/100ML; MG/100ML; MG/100ML
INJECTION, SOLUTION INTRAVENOUS CONTINUOUS
Status: DISCONTINUED | OUTPATIENT
Start: 2019-11-21 | End: 2019-11-21 | Stop reason: HOSPADM

## 2019-11-21 RX ORDER — SODIUM CHLORIDE 9 MG/ML
INJECTION, SOLUTION INTRAVENOUS CONTINUOUS PRN
Status: DISCONTINUED | OUTPATIENT
Start: 2019-11-21 | End: 2019-11-21

## 2019-11-21 RX ORDER — MAGNESIUM OXIDE 400 MG/1
400 TABLET ORAL 2 TIMES DAILY
Status: DISCONTINUED | OUTPATIENT
Start: 2019-11-21 | End: 2019-11-22 | Stop reason: HOSPADM

## 2019-11-21 RX ORDER — PROPOFOL 10 MG/ML
INJECTION, EMULSION INTRAVENOUS PRN
Status: DISCONTINUED | OUTPATIENT
Start: 2019-11-21 | End: 2019-11-21

## 2019-11-21 RX ORDER — ISOSORBIDE MONONITRATE 30 MG/1
30 TABLET, EXTENDED RELEASE ORAL DAILY
Status: DISCONTINUED | OUTPATIENT
Start: 2019-11-22 | End: 2019-11-22 | Stop reason: HOSPADM

## 2019-11-21 RX ORDER — CEFAZOLIN SODIUM 2 G/100ML
2 INJECTION, SOLUTION INTRAVENOUS
Status: COMPLETED | OUTPATIENT
Start: 2019-11-21 | End: 2019-11-21

## 2019-11-21 RX ORDER — CALCIUM CARBONATE 500 MG/1
1000 TABLET, CHEWABLE ORAL 4 TIMES DAILY PRN
Status: DISCONTINUED | OUTPATIENT
Start: 2019-11-21 | End: 2019-11-22 | Stop reason: HOSPADM

## 2019-11-21 RX ORDER — NALOXONE HYDROCHLORIDE 0.4 MG/ML
.1-.4 INJECTION, SOLUTION INTRAMUSCULAR; INTRAVENOUS; SUBCUTANEOUS
Status: DISCONTINUED | OUTPATIENT
Start: 2019-11-21 | End: 2019-11-22 | Stop reason: HOSPADM

## 2019-11-21 RX ORDER — EPHEDRINE SULFATE 50 MG/ML
INJECTION, SOLUTION INTRAMUSCULAR; INTRAVENOUS; SUBCUTANEOUS PRN
Status: DISCONTINUED | OUTPATIENT
Start: 2019-11-21 | End: 2019-11-21

## 2019-11-21 RX ORDER — FENTANYL CITRATE 50 UG/ML
INJECTION, SOLUTION INTRAMUSCULAR; INTRAVENOUS PRN
Status: DISCONTINUED | OUTPATIENT
Start: 2019-11-21 | End: 2019-11-21

## 2019-11-21 RX ORDER — HYDROXYZINE HYDROCHLORIDE 25 MG/1
25 TABLET, FILM COATED ORAL EVERY 6 HOURS PRN
Status: DISCONTINUED | OUTPATIENT
Start: 2019-11-21 | End: 2019-11-22 | Stop reason: HOSPADM

## 2019-11-21 RX ORDER — ONDANSETRON 2 MG/ML
4 INJECTION INTRAMUSCULAR; INTRAVENOUS EVERY 6 HOURS PRN
Status: DISCONTINUED | OUTPATIENT
Start: 2019-11-21 | End: 2019-11-22 | Stop reason: HOSPADM

## 2019-11-21 RX ORDER — MAGNESIUM HYDROXIDE 1200 MG/15ML
LIQUID ORAL PRN
Status: DISCONTINUED | OUTPATIENT
Start: 2019-11-21 | End: 2019-11-21 | Stop reason: HOSPADM

## 2019-11-21 RX ORDER — ONDANSETRON 2 MG/ML
4 INJECTION INTRAMUSCULAR; INTRAVENOUS EVERY 30 MIN PRN
Status: DISCONTINUED | OUTPATIENT
Start: 2019-11-21 | End: 2019-11-21 | Stop reason: HOSPADM

## 2019-11-21 RX ORDER — CARVEDILOL 12.5 MG/1
12.5 TABLET ORAL 2 TIMES DAILY WITH MEALS
Status: DISCONTINUED | OUTPATIENT
Start: 2019-11-21 | End: 2019-11-22 | Stop reason: HOSPADM

## 2019-11-21 RX ORDER — ONDANSETRON 4 MG/1
4 TABLET, ORALLY DISINTEGRATING ORAL EVERY 30 MIN PRN
Status: DISCONTINUED | OUTPATIENT
Start: 2019-11-21 | End: 2019-11-21 | Stop reason: HOSPADM

## 2019-11-21 RX ADMIN — HYDRALAZINE HYDROCHLORIDE 75 MG: 25 TABLET ORAL at 15:56

## 2019-11-21 RX ADMIN — PROPOFOL 100 MG: 10 INJECTION, EMULSION INTRAVENOUS at 09:18

## 2019-11-21 RX ADMIN — CARVEDILOL 12.5 MG: 12.5 TABLET, FILM COATED ORAL at 17:30

## 2019-11-21 RX ADMIN — CEFAZOLIN SODIUM 2 G: 2 INJECTION, SOLUTION INTRAVENOUS at 09:23

## 2019-11-21 RX ADMIN — FENTANYL CITRATE 25 MCG: 50 INJECTION, SOLUTION INTRAMUSCULAR; INTRAVENOUS at 09:55

## 2019-11-21 RX ADMIN — PHENYLEPHRINE HYDROCHLORIDE 150 MCG: 10 INJECTION INTRAVENOUS at 09:31

## 2019-11-21 RX ADMIN — PHENYLEPHRINE HYDROCHLORIDE 100 MCG: 10 INJECTION INTRAVENOUS at 09:20

## 2019-11-21 RX ADMIN — LIDOCAINE HYDROCHLORIDE 100 MG: 20 INJECTION, SOLUTION INFILTRATION; PERINEURAL at 09:18

## 2019-11-21 RX ADMIN — FENTANYL CITRATE 25 MCG: 50 INJECTION, SOLUTION INTRAMUSCULAR; INTRAVENOUS at 10:14

## 2019-11-21 RX ADMIN — PHENYLEPHRINE HYDROCHLORIDE 150 MCG: 10 INJECTION INTRAVENOUS at 09:36

## 2019-11-21 RX ADMIN — SODIUM CHLORIDE: 9 INJECTION, SOLUTION INTRAVENOUS at 10:26

## 2019-11-21 RX ADMIN — MIDAZOLAM 2 MG: 1 INJECTION INTRAMUSCULAR; INTRAVENOUS at 09:13

## 2019-11-21 RX ADMIN — HYDRALAZINE HYDROCHLORIDE 75 MG: 25 TABLET ORAL at 21:01

## 2019-11-21 RX ADMIN — PHENYLEPHRINE HYDROCHLORIDE 150 MCG: 10 INJECTION INTRAVENOUS at 09:30

## 2019-11-21 RX ADMIN — FENTANYL CITRATE 25 MCG: 50 INJECTION, SOLUTION INTRAMUSCULAR; INTRAVENOUS at 09:18

## 2019-11-21 RX ADMIN — SODIUM CHLORIDE: 9 INJECTION, SOLUTION INTRAVENOUS at 09:13

## 2019-11-21 RX ADMIN — FENTANYL CITRATE 50 MCG: 0.05 INJECTION, SOLUTION INTRAMUSCULAR; INTRAVENOUS at 10:58

## 2019-11-21 RX ADMIN — FENTANYL CITRATE 25 MCG: 50 INJECTION, SOLUTION INTRAMUSCULAR; INTRAVENOUS at 10:08

## 2019-11-21 RX ADMIN — PHENYLEPHRINE HYDROCHLORIDE 100 MCG: 10 INJECTION INTRAVENOUS at 09:24

## 2019-11-21 RX ADMIN — Medication 10 MG: at 09:38

## 2019-11-21 RX ADMIN — PROPOFOL 100 MG: 10 INJECTION, EMULSION INTRAVENOUS at 09:20

## 2019-11-21 RX ADMIN — ONDANSETRON 4 MG: 2 INJECTION INTRAMUSCULAR; INTRAVENOUS at 10:26

## 2019-11-21 ASSESSMENT — ACTIVITIES OF DAILY LIVING (ADL)
TOILETING: 0-->INDEPENDENT
RETIRED_COMMUNICATION: 0-->UNDERSTANDS/COMMUNICATES WITHOUT DIFFICULTY
COGNITION: 0 - NO COGNITION ISSUES REPORTED
NUMBER_OF_TIMES_PATIENT_HAS_FALLEN_WITHIN_LAST_SIX_MONTHS: 1
SWALLOWING: 0-->SWALLOWS FOODS/LIQUIDS WITHOUT DIFFICULTY
DRESS: 0-->INDEPENDENT
AMBULATION: 0-->INDEPENDENT
BATHING: 0-->INDEPENDENT
RETIRED_EATING: 0-->INDEPENDENT
FALL_HISTORY_WITHIN_LAST_SIX_MONTHS: YES
PRIOR_FUNCTIONAL_LEVEL_COMMENT: IND
TRANSFERRING: 0-->INDEPENDENT

## 2019-11-21 ASSESSMENT — LIFESTYLE VARIABLES: TOBACCO_USE: 1

## 2019-11-21 ASSESSMENT — MIFFLIN-ST. JEOR: SCORE: 1891.25

## 2019-11-21 NOTE — ANESTHESIA POSTPROCEDURE EVALUATION
Patient: Ras Esparza    Procedure(s):  GREEN LIGHT LASER VAPORIZATION OF THE PROSTATE    Diagnosis:Urinary retention [R33.9]  Diagnosis Additional Information: No value filed.    Anesthesia Type:  General, LMA    Note:  Anesthesia Post Evaluation    Patient location during evaluation: PACU  Patient participation: Able to fully participate in evaluation  Level of consciousness: awake and alert  Pain management: adequate  Airway patency: patent  Cardiovascular status: acceptable  Respiratory status: acceptable and unassisted  Hydration status: acceptable  PONV: none             Last vitals:  Vitals:    11/21/19 1145 11/21/19 1200 11/21/19 1215   BP: (!) 140/83 (!) 142/83 (!) 143/88   Pulse: 62 61    Resp: 12 9 12   Temp: 36.1  C (97  F) 36.2  C (97.2  F) 36.3  C (97.3  F)   SpO2: 100% 99% 99%         Electronically Signed By: Taylor Thakkar MD  November 21, 2019  1:00 PM

## 2019-11-21 NOTE — OP NOTE
UROLOGY OPERATIVE REPORT    Ras Esparza  1955, 64 year old    SURGEON  Surgeon(s):  Lencho Germain MD    PREOP DIAGNOSIS  BPH, Urinary retention, milligan dependent    POSTOP DIAGNOSIS  Same    PROCEDURE  Procedure(s):  GREEN LIGHT LASER VAPORIZATION OF THE PROSTATE    FINDINGS  Bilobar hypertrophy, wide open at the end of the case.     ANESTHESIA  General    STAFF  Circulator: Rika Andrade RN  Relief Circulator: Elle Núñez RN  Relief Scrub: Lluvia Colón  Scrub Person: Tiffanie Vines    COMPLICATIONS  None    SPECIMEN  none    PROSTHESIS/ GRAFTS/ DEVICES  None    EBL  5cc    TECHNIQUE  The patient was taken to the cysto suite and placed in th dorsal lithotomy positron. The external genitalia were prepped and draped in a sterile fashion and a time out was taken to verify the correct procedure and patient.     The 22F continuous flow laser sheath was gently passed through the urethra without force or trauma. The bladder was viewed with the above findings. The greenlight laser fiber was used to vaporize the prostate. Resection was started at the bladder neck in the lower left quadrant. Each quadrant was sequentially resected. The prostatic channel was opened to the external sphincter.     The bladder was the irrigated. A 22F milligan was placed. It was then connected to a drainage bag.    PLAN  Admit for bladder irrigation due to hematuria.   TOV tomorrow am (if irrigant light pink).   F/u in office in 4-6 weeks for a post op visit.         Lencho Germain MD

## 2019-11-21 NOTE — PROGRESS NOTES
Admission medication history interview status for the 11/21/2019  admission is complete. See EPIC admission navigator for prior to admission medications     Medication history source reliability:Good    Medication history interview source(s):Patient    Medication history resources (including written lists, pill bottles, clinic record):None    Primary pharmacy.[facility]    Additional medication history information not noted on PTA med list :none    Time spent in this activity: 40 minutes    Prior to Admission medications    Medication Sig Last Dose Taking? Auth Provider   aspirin (ASA) 81 MG chewable tablet Take 1 tablet (81 mg) by mouth daily 11/14/2019 at am Yes Eloy Ceron MD   carvedilol (COREG) 12.5 MG tablet Take 1 tablet (12.5 mg) by mouth 2 times daily (with meals) 11/21/2019 at 1201 Yes Raquel Tran APRN CNP   Ferrous Gluconate 240 (27 Fe) MG TABS Take 1 tablet by mouth daily  11/14/2019 at am Yes Reported, Patient   hydrALAZINE (APRESOLINE) 50 MG tablet Take 1.5 tablets (75 mg) by mouth 3 times daily 11/21/2019 at 0600 Yes Raquel Tran APRN CNP   isosorbide mononitrate (IMDUR) 30 MG 24 hr tablet Take 1 tablet (30 mg) by mouth daily 11/21/2019 at 1201 Yes Raquel Tran APRN CNP   magnesium oxide (MAG-OX) 400 MG tablet Take 1 tablet (400 mg) by mouth 2 times daily 11/14/2019 at am Yes Eloy Ceron MD

## 2019-11-21 NOTE — ANESTHESIA PREPROCEDURE EVALUATION
Anesthesia Pre-Procedure Evaluation    Patient: Ras Esparza   MRN: [id number] : 1955          Preoperative Diagnosis: Urinary retention [R33.9]    Procedure(s):  CYSTOSCOPY, WITH TRANSURETHRAL RESECTION PROSTATE  POSSIBLE GREEN LIGHT LASER VAPORIZATION OF THE PROSTATE    Past Medical History:   Diagnosis Date     Anemia, unspecified type 2019     Benign essential hypertension 2019     Bladder outflow obstruction 2019     Cardiomyopathy, unspecified type (H) 2019     CKD (chronic kidney disease) stage 4, GFR 15-29 ml/min (H) 2019     Hyperlipidemia LDL goal <70 2019     Tobacco abuse      No past surgical history on file.    Anesthesia Evaluation     . Pt has had prior anesthetic.     No history of anesthetic complications          ROS/MED HX    ENT/Pulmonary:     (+)tobacco use, Past use , . .   (-) sleep apnea   Neurologic:  - neg neurologic ROS     Cardiovascular:     (+) hypertension--CAD, --. : . CHF etiology: ischemic . RAMON, . :. . Previous cardiac testing Echodate:10/2019results:Left ventricular systolic function is mildly reduced.  LVEF 46% based on biplane 2D tracing. Mild global hypokinesia of the left  ventricle.  The right ventricle is normal in structure, function and size.  No significant valvular abnormalities.  Mild aortic root dilatation. Max diameter of the visualized portion 4.2 cm.     This study was compared to a prior TTE from 2019. Global left ventricular  systolic funtion has improved. The aortic root is stable in size, previously  4.3cm, mildly dilated. The ascending aorta was previously measured at 4.1cm,  however it was measured at 3.7cm (within normal limits) on this present studyStress Testdate:10/2019 results:1.  Myocardial perfusion imaging using single isotope technique  demonstrated partially reversible inferior and basal inferolateral  defect.   The small, reversible basal inferolateral defect suggests mild  ischemia in the circumflex  "distribution.   The fixed inferior defect is likely due to diaphragmatic attenuation.   2. Gated images demonstrated mildly dilated left ventricle with  borderline Global hypokinesis.  The left ventricular systolic function  is mildly reduced at 40% post stress.  3. Compared to the prior study from na date: results: date: results:          METS/Exercise Tolerance:     Hematologic:         Musculoskeletal:         GI/Hepatic:        (-) GERD   Renal/Genitourinary:     (+) chronic renal disease, type: CRI, BPH,       Endo:  - neg endo ROS       Psychiatric:         Infectious Disease:         Malignancy:         Other:                          Physical Exam  Normal systems: pulmonary    Airway   Mallampati: III  TM distance: >3 FB  Neck ROM: full    Dental   (+) caps    Cardiovascular   Rhythm and rate: regular      Pulmonary             Lab Results   Component Value Date    WBC 8.2 10/31/2019    HGB 12.3 (L) 10/31/2019    HCT 37.7 (L) 10/31/2019     10/31/2019     10/31/2019    POTASSIUM 5.2 10/31/2019    CHLORIDE 112 (H) 10/31/2019    CO2 20 10/31/2019    BUN 50 (H) 10/31/2019    CR 3.04 (H) 10/31/2019    GLC 84 10/31/2019    ELICIA 8.3 (L) 10/31/2019    PHOS 3.4 10/31/2019    MAG 2.0 06/28/2019    ALBUMIN 3.5 10/31/2019    PROTTOTAL 7.1 06/21/2019    ALT 38 06/21/2019    AST 15 06/21/2019    ALKPHOS 80 06/21/2019    BILITOTAL 0.6 06/21/2019    TSH 0.85 06/20/2019       Preop Vitals  BP Readings from Last 3 Encounters:   11/05/19 (!) 140/80   10/31/19 124/78   10/03/19 120/78    Pulse Readings from Last 3 Encounters:   11/05/19 66   10/31/19 63   09/30/19 67      Resp Readings from Last 3 Encounters:   10/31/19 16   07/18/19 16   07/01/19 16    SpO2 Readings from Last 3 Encounters:   10/31/19 99%   07/18/19 99%   07/01/19 99%      Temp Readings from Last 1 Encounters:   10/31/19 36.8  C (98.3  F) (Temporal)    Ht Readings from Last 1 Encounters:   11/05/19 1.816 m (5' 11.5\")      Wt Readings from Last 1 " "Encounters:   11/05/19 103.7 kg (228 lb 9.6 oz)    Estimated body mass index is 31.44 kg/m  as calculated from the following:    Height as of 11/5/19: 1.816 m (5' 11.5\").    Weight as of 11/5/19: 103.7 kg (228 lb 9.6 oz).       Anesthesia Plan      History & Physical Review  History and physical reviewed and following examination; no interval change.    ASA Status:  3 .    NPO Status:  > 8 hours    Plan for General and LMA with Intravenous and Propofol induction. Maintenance will be Inhalation.    PONV prophylaxis:  Ondansetron (or other 5HT-3)       Postoperative Care  Postoperative pain management:  Multi-modal analgesia.      Consents  Anesthetic plan, risks, benefits and alternatives discussed with:  Patient..                 Taylor Thakkar MD  "

## 2019-11-21 NOTE — PLAN OF CARE
Arrived from PACU around 1400. A&Ox4; VSS on RA. Denies pain/nausea. Mayberry draining yellow urine. CMS intact. Plan to discharge home tomorrow.

## 2019-11-21 NOTE — OR NURSING
Report called to [address], Chiqui COELHO.  Pt to room via cart with NA in escort.  All belongings sent with pt.  Family inga notified of transfer.

## 2019-11-21 NOTE — ANESTHESIA CARE TRANSFER NOTE
Patient: Ras Esparza    Procedure(s):  CYSTOSCOPY, WITH TRANSURETHRAL RESECTION PROSTATE  GREEN LIGHT LASER VAPORIZATION OF THE PROSTATE    Diagnosis: Urinary retention [R33.9]  Diagnosis Additional Information: No value filed.    Anesthesia Type:   General, LMA     Note:  Airway :Nasal Cannula  Patient transferred to:PACU  Handoff Report: Identifed the Patient, Identified the Reponsible Provider, Reviewed the pertinent medical history, Discussed the surgical course, Reviewed Intra-OP anesthesia mangement and issues during anesthesia, Set expectations for post-procedure period and Allowed opportunity for questions and acknowledgement of understanding      Vitals: (Last set prior to Anesthesia Care Transfer)    CRNA VITALS  11/21/2019 1005 - 11/21/2019 1040      11/21/2019             Pulse:  82    SpO2:  99 %    Resp Rate (set):  10                Electronically Signed By: MAGNOLIA Sanchez CRNA  November 21, 2019  10:40 AM

## 2019-11-21 NOTE — CONSULTS
[facility]  Consult Note - Hospitalist Service     Date of Admission:  11/21/2019  Consult Requested by: Dr. Germain  Reason for Consult: medical comanagement    Assessment & Plan   Ras Esparza is a 64 year old male with a past medical history of milligan dependent bladder outlet obstruction and acute renal failure, cardiomyopathy, hypertension, and hyperlipidemia who underwent previously scheduled TURP with Dr. Germain. Hospitalist service was consulted postoperatively for co-medical management.    Bladder outlet obstruction, milligan dependent s/p TURP on 11/21/2019  CKD-IV  Admitted in 06/2019 with bladder outlet obstruction and Cr to 9.59, following milligan placement pt with improvement in Cr values to ~3. Follows closely with nephrology, urology as outpatient. Cr remains stable in ambulatory setting.  - BMP in AM  - Further postoperative management per primary service    Cardiomyopathy   Hypertension  Hyperlipidemia  Diagnosed during admission 06/2019. Seen and follows with cardiology as outpatient. TTE during that admission with reduced LV systolic function and EF 30%, moderately dilated LV and global hypokinesia. Seen in follow up with improvement in EF to 46%. Etiology of cardiomyopathy remains unknown, presumed ischemic. Nuclear stress 10/3/19 indicated partially reversible inferior and basal inferolateral defect suggestive of mild ischemia in Cx. As pt was asymptomatic, elected for med management.  - Continues on PTA hydralazine 75mg TID, Coreg, Imdur 30mg daily    Anemia  Due to CKD. Hgb 12.3 preoperatively.  - Monitor    The patient's care was discussed with the Attending Physician, Dr. Dyer, Bedside Nurse and Patient.    Phu Ann PA-C  [facility]    ______________________________________________________________________    History of Present Illness   Ras Esparza is a 64 year old male with a past medical history of milligan dependent bladder outlet obstruction and  acute renal failure, cardiomyopathy, hypertension, and hyperlipidemia who underwent previously scheduled TURP with Dr. Germain on 2019. Surgery was performed using general anesthesia, EBL 5. Postoperatively, hospitalist service was consulted for comedical management.    Pt seen & evaluated. Presently resting comfortably in bed, reassured to see he has had minimal blood in his urine. No cp, sob. Tolerating clear liquids. Denies significant pain or spasming. No acute concerns.    Review of Systems   The 10 point Review of Systems is negative other than noted in the HPI or here.     Past Medical History    I have reviewed this patient's medical history and updated it with pertinent information if needed.   Past Medical History:   Diagnosis Date     Anemia, unspecified type 2019     Benign essential hypertension 2019     Bladder outflow obstruction 2019     Cardiomyopathy, unspecified type (H) 2019     CKD (chronic kidney disease) stage 4, GFR 15-29 ml/min (H) 2019     Hyperlipidemia LDL goal <70 2019     Tobacco abuse        Past Surgical History   I have reviewed this patient's surgical history and updated it with pertinent information if needed.  History reviewed. No pertinent surgical history.    Social History   I have reviewed this patient's social history and updated it with pertinent information if needed.  Social History     Tobacco Use     Smoking status: Former Smoker     Packs/day: 0.50     Years: 30.00     Pack years: 15.00     Types: Cigarettes     Last attempt to quit: 2019     Years since quittin.5     Smokeless tobacco: Never Used     Tobacco comment: 12 cigarettes per day   Substance Use Topics     Alcohol use: Not Currently     Alcohol/week: 0.0 standard drinks     Comment: quit in 2018.      Drug use: Never       Family History   I have reviewed this patient's family history and updated it with pertinent information if needed.   Family History   Problem  Relation Age of Onset     Glaucoma Mother      Throat cancer Father      Rheumatoid Arthritis Sister      Alcoholism Brother      Liver Disease Brother        Medications   Medications Prior to Admission   Medication Sig Dispense Refill Last Dose     aspirin (ASA) 81 MG chewable tablet Take 1 tablet (81 mg) by mouth daily 30 tablet 11 11/11/2019 at am     carvedilol (COREG) 12.5 MG tablet Take 1 tablet (12.5 mg) by mouth 2 times daily (with meals) 180 tablet 3 11/21/2019 at 1201     Ferrous Gluconate 240 (27 Fe) MG TABS Take 1 tablet by mouth daily    11/14/2019 at am     hydrALAZINE (APRESOLINE) 50 MG tablet Take 1.5 tablets (75 mg) by mouth 3 times daily 270 tablet 3 11/21/2019 at 0600     isosorbide mononitrate (IMDUR) 30 MG 24 hr tablet Take 1 tablet (30 mg) by mouth daily 90 tablet 3 11/21/2019 at 1201     magnesium oxide (MAG-OX) 400 MG tablet Take 1 tablet (400 mg) by mouth 2 times daily 60 tablet 0 11/14/2019 at am       Allergies   No Known Allergies    Physical Exam   Vital Signs: Temp: 96.8  F (36  C) Temp src: Oral BP: (!) 142/92 Pulse: 70 Heart Rate: 65 Resp: 14 SpO2: 99 % O2 Device: None (Room air) Oxygen Delivery: 2 LPM  Weight: 232 lbs 3.2 oz    GEN: well-developed, well-nourished, appears comfortable  HEENT: NCAT, PERRL, EOM intact bilaterally, sclera clear, conjunctiva normal, nose & mouth patent, mucous membranes moist  CHEST: lungs CTA bilaterally, no increased work of breathing, no wheeze, rales, rhonchi  HEART: RRR, S1 & S2, no murmur  ABD: soft, nontender, nondistended, no guarding or rigidity, +BS in all 4 quadrants  : milligan patent with clear urine, +small amount of sediment  MSK: full AROM bilateral UE/LE, pedal & radial pulses 2+ bilaterally  SKIN: warm & dry without rash, no pedal edema    Data   Results for orders placed or performed during the hospital encounter of 11/21/19 (from the past 24 hour(s))   Basic metabolic panel   Result Value Ref Range    Sodium 142 133 - 144 mmol/L     Potassium 4.8 3.4 - 5.3 mmol/L    Chloride 116 (H) 94 - 109 mmol/L    Carbon Dioxide 20 20 - 32 mmol/L    Anion Gap 6 3 - 14 mmol/L    Glucose 96 70 - 99 mg/dL    Urea Nitrogen 68 (H) 7 - 30 mg/dL    Creatinine 3.38 (H) 0.66 - 1.25 mg/dL    GFR Estimate 18 (L) >60 mL/min/[1.73_m2]    GFR Estimate If Black 21 (L) >60 mL/min/[1.73_m2]    Calcium 8.8 8.5 - 10.1 mg/dL

## 2019-11-22 VITALS
TEMPERATURE: 97.3 F | SYSTOLIC BLOOD PRESSURE: 123 MMHG | BODY MASS INDEX: 31.45 KG/M2 | WEIGHT: 232.2 LBS | RESPIRATION RATE: 16 BRPM | OXYGEN SATURATION: 98 % | HEIGHT: 72 IN | DIASTOLIC BLOOD PRESSURE: 80 MMHG | HEART RATE: 92 BPM

## 2019-11-22 LAB
ANION GAP SERPL CALCULATED.3IONS-SCNC: 6 MMOL/L (ref 3–14)
BUN SERPL-MCNC: 52 MG/DL (ref 7–30)
CALCIUM SERPL-MCNC: 8.2 MG/DL (ref 8.5–10.1)
CHLORIDE SERPL-SCNC: 116 MMOL/L (ref 94–109)
CO2 SERPL-SCNC: 18 MMOL/L (ref 20–32)
CREAT SERPL-MCNC: 3.11 MG/DL (ref 0.66–1.25)
GFR SERPL CREATININE-BSD FRML MDRD: 20 ML/MIN/{1.73_M2}
GLUCOSE SERPL-MCNC: 104 MG/DL (ref 70–99)
HGB BLD-MCNC: 12.1 G/DL (ref 13.3–17.7)
POTASSIUM SERPL-SCNC: 4.8 MMOL/L (ref 3.4–5.3)
SODIUM SERPL-SCNC: 140 MMOL/L (ref 133–144)

## 2019-11-22 PROCEDURE — 25000132 ZZH RX MED GY IP 250 OP 250 PS 637: Performed by: UROLOGY

## 2019-11-22 PROCEDURE — 80048 BASIC METABOLIC PNL TOTAL CA: CPT | Performed by: PHYSICIAN ASSISTANT

## 2019-11-22 PROCEDURE — 85018 HEMOGLOBIN: CPT | Performed by: HOSPITALIST

## 2019-11-22 PROCEDURE — 99207 ZZC CDG-CODE CATEGORY CHANGED: CPT | Performed by: HOSPITALIST

## 2019-11-22 PROCEDURE — 99214 OFFICE O/P EST MOD 30 MIN: CPT | Performed by: HOSPITALIST

## 2019-11-22 PROCEDURE — 36415 COLL VENOUS BLD VENIPUNCTURE: CPT | Performed by: PHYSICIAN ASSISTANT

## 2019-11-22 RX ORDER — IBUPROFEN 600 MG/1
600 TABLET, FILM COATED ORAL EVERY 6 HOURS PRN
Qty: 12 TABLET | Refills: 0 | Status: SHIPPED | OUTPATIENT
Start: 2019-11-22 | End: 2019-12-10

## 2019-11-22 RX ORDER — ASPIRIN 81 MG/1
81 TABLET, CHEWABLE ORAL DAILY
Qty: 30 TABLET | Refills: 11
Start: 2019-11-29 | End: 2019-12-16

## 2019-11-22 RX ORDER — ACETAMINOPHEN 325 MG/1
650 TABLET ORAL EVERY 4 HOURS PRN
Qty: 100 TABLET | Refills: 0 | Status: SHIPPED | OUTPATIENT
Start: 2019-11-22 | End: 2023-01-16

## 2019-11-22 RX ORDER — METHOCARBAMOL 750 MG/1
750 TABLET, FILM COATED ORAL EVERY 6 HOURS PRN
Qty: 60 TABLET | Refills: 0 | Status: SHIPPED | OUTPATIENT
Start: 2019-11-22 | End: 2019-12-13

## 2019-11-22 RX ORDER — AMOXICILLIN 250 MG
1-2 CAPSULE ORAL 2 TIMES DAILY
Qty: 30 TABLET | Refills: 0 | Status: SHIPPED | OUTPATIENT
Start: 2019-11-22 | End: 2019-12-13

## 2019-11-22 RX ADMIN — HYDRALAZINE HYDROCHLORIDE 75 MG: 25 TABLET ORAL at 08:13

## 2019-11-22 RX ADMIN — CARVEDILOL 12.5 MG: 12.5 TABLET, FILM COATED ORAL at 08:13

## 2019-11-22 RX ADMIN — Medication 400 MG: at 08:13

## 2019-11-22 RX ADMIN — ISOSORBIDE MONONITRATE 30 MG: 30 TABLET, EXTENDED RELEASE ORAL at 08:13

## 2019-11-22 NOTE — PROGRESS NOTES
[facility]    Urology Progress Note     Assessment & Plan  Ras Esparza is a 64 year old male POD#1 KTP laser TURP for urinary retention. AVSS, afebrile. Urine clear overnight without clots and minimal irrigation. Ambulated. Tolerating diet.    Plan:    TOV this AM: Voiding trial today: Fill bladder with 2-300cc NS (until patient feels urge to void. Remove milligan catheter. Encourage double void and ambulation. Bladder scan for post void residual and chart in flow sheet. If residual is >350cc and/or patient is uncomfortable, page urology.     Likely discharge later today    Follow up in 4-6 weeks for PVR recheck      Prosper Colón PA-C 11/22/2019 9:19 AM  [facility]  Mon-Fri, 7am - 4pm  Office: [phone]      Interval History   Catheter running clear pink overnight without any irrigation. Ambulated last night without issue. No pain. Tolerating diet.     Physical Exam   Temp: 97.3  F (36.3  C) Temp src: Oral BP: 123/80 Pulse: 92 Heart Rate: 70 Resp: 16 SpO2: 98 % O2 Device: None (Room air) Oxygen Delivery: 2 LPM  Vitals:    11/21/19 0731   Weight: 105.3 kg (232 lb 3.2 oz)     Vital Signs with Ranges  Temp:  [96.5  F (35.8  C)-98.1  F (36.7  C)] 97.3  F (36.3  C)  Pulse:  [61-92] 92  Heart Rate:  [62-71] 70  Resp:  [9-18] 16  BP: (123-157)/() 123/80  SpO2:  [97 %-100 %] 98 %  I/O last 3 completed shifts:  In: 990 [P.O.:240; I.V.:750]  Out: 1750 [Urine:1750]    General: Patient awake, alert, NAD  Head: Normocephalic, atraumatic  Eyes: No icterus  Neck: Symmetric  Respiratory: Breathing unlabored  Cardiac: Skin well-perfused  GI: Soft, NT, non-distended, no SP tenderness, no CVA tenderness  Genitourinary: Milligan in place draining clear pink urine, No penoscrotal edema  Skin: No visible rashes   Extremities: No LE edema, no calf pain  Neurologic: No focal deficits  Neuropsychiatric: A&O x 3, responding appropriately      Medications       carvedilol  12.5 mg Oral BID w/meals      hydrALAZINE  75 mg Oral TID     isosorbide mononitrate  30 mg Oral Daily     magnesium oxide  400 mg Oral BID     sodium chloride (PF)  3 mL Intracatheter Q8H       Data   Results for orders placed or performed during the hospital encounter of 11/21/19 (from the past 24 hour(s))   Internal Medicine IP Consult: Patient to be seen: Routine - within 24 hours; post op HTN management; Consultant may enter orders: Yes; Requesting provider? Hospitalist (if different from attending physician)    Phu Vázquez PA-C     11/21/2019  3:37 PM  [facility]  Consult Note - Hospitalist Service     Date of Admission:  11/21/2019  Consult Requested by: Dr. Germain  Reason for Consult: medical comanagement    Assessment & Plan   Ras Esparza is a 64 year old male with a past medical history   of milligan dependent bladder outlet obstruction and acute renal   failure, cardiomyopathy, hypertension, and hyperlipidemia who   underwent previously scheduled TURP with Dr. Germain. Hospitalist   service was consulted postoperatively for co-medical management.    Bladder outlet obstruction, milligan dependent s/p TURP on   11/21/2019  CKD-IV  Admitted in 06/2019 with bladder outlet obstruction and Cr to   9.59, following milligan placement pt with improvement in Cr values   to ~3. Follows closely with nephrology, urology as outpatient. Cr   remains stable in ambulatory setting.  - BMP in AM  - Further postoperative management per primary service    Cardiomyopathy   Hypertension  Hyperlipidemia  Diagnosed during admission 06/2019. Seen and follows with   cardiology as outpatient. TTE during that admission with reduced   LV systolic function and EF 30%, moderately dilated LV and global   hypokinesia. Seen in follow up with improvement in EF to 46%.   Etiology of cardiomyopathy remains unknown, presumed ischemic.   Nuclear stress 10/3/19 indicated partially reversible inferior   and basal inferolateral defect suggestive of  mild ischemia in Cx.   As pt was asymptomatic, elected for med management.  - Continues on PTA hydralazine 75mg TID, Coreg, Imdur 30mg daily    Anemia  Due to CKD. Hgb 12.3 preoperatively.  - Monitor    The patient's care was discussed with the Attending Physician,   Dr. Dyer, Bedside Nurse and Patient.    Phu Ann PA-C  [facility]    __________________________________________________________________  ____    History of Present Illness   Ras Esparza is a 64 year old male with a past medical history   of milligan dependent bladder outlet obstruction and acute renal   failure, cardiomyopathy, hypertension, and hyperlipidemia who   underwent previously scheduled TURP with Dr. Germain on   11/21/2019. Surgery was performed using general anesthesia, EBL   5. Postoperatively, hospitalist service was consulted for   comedical management.    Pt seen & evaluated. Presently resting comfortably in bed,   reassured to see he has had minimal blood in his urine. No cp,   sob. Tolerating clear liquids. Denies significant pain or   spasming. No acute concerns.    Review of Systems   The 10 point Review of Systems is negative other than noted in   the HPI or here.     Past Medical History    I have reviewed this patient's medical history and updated it   with pertinent information if needed.   Past Medical History:   Diagnosis Date     Anemia, unspecified type 7/2/2019     Benign essential hypertension 11/5/2019     Bladder outflow obstruction 7/2/2019     Cardiomyopathy, unspecified type (H) 7/2/2019     CKD (chronic kidney disease) stage 4, GFR 15-29 ml/min (H)   7/2/2019     Hyperlipidemia LDL goal <70 8/25/2019     Tobacco abuse        Past Surgical History   I have reviewed this patient's surgical history and updated it   with pertinent information if needed.  History reviewed. No pertinent surgical history.    Social History   I have reviewed this patient's social history and updated it with    pertinent information if needed.  Social History     Tobacco Use     Smoking status: Former Smoker     Packs/day: 0.50     Years: 30.00     Pack years: 15.00     Types: Cigarettes     Last attempt to quit: 2019     Years since quittin.5     Smokeless tobacco: Never Used     Tobacco comment: 12 cigarettes per day   Substance Use Topics     Alcohol use: Not Currently     Alcohol/week: 0.0 standard drinks     Comment: quit in 2018.      Drug use: Never       Family History   I have reviewed this patient's family history and updated it with   pertinent information if needed.   Family History   Problem Relation Age of Onset     Glaucoma Mother      Throat cancer Father      Rheumatoid Arthritis Sister      Alcoholism Brother      Liver Disease Brother        Medications   Medications Prior to Admission   Medication Sig Dispense Refill Last Dose     aspirin (ASA) 81 MG chewable tablet Take 1 tablet (81 mg) by   mouth daily 30 tablet 11 2019 at am     carvedilol (COREG) 12.5 MG tablet Take 1 tablet (12.5 mg) by   mouth 2 times daily (with meals) 180 tablet 3 2019 at 1201     Ferrous Gluconate 240 (27 Fe) MG TABS Take 1 tablet by mouth   daily    2019 at am     hydrALAZINE (APRESOLINE) 50 MG tablet Take 1.5 tablets (75 mg)   by mouth 3 times daily 270 tablet 3 2019 at 0600     isosorbide mononitrate (IMDUR) 30 MG 24 hr tablet Take 1 tablet   (30 mg) by mouth daily 90 tablet 3 2019 at 1201     magnesium oxide (MAG-OX) 400 MG tablet Take 1 tablet (400 mg)   by mouth 2 times daily 60 tablet 0 2019 at am       Allergies   No Known Allergies    Physical Exam   Vital Signs: Temp: 96.8  F (36  C) Temp src: Oral BP: (!) 142/92   Pulse: 70 Heart Rate: 65 Resp: 14 SpO2: 99 % O2 Device: None   (Room air) Oxygen Delivery: 2 LPM  Weight: 232 lbs 3.2 oz    GEN: well-developed, well-nourished, appears comfortable  HEENT: NCAT, PERRL, EOM intact bilaterally, sclera clear,   conjunctiva normal,  nose & mouth patent, mucous membranes moist  CHEST: lungs CTA bilaterally, no increased work of breathing, no   wheeze, rales, rhonchi  HEART: RRR, S1 & S2, no murmur  ABD: soft, nontender, nondistended, no guarding or rigidity, +BS   in all 4 quadrants  : milligan patent with clear urine, +small amount of sediment  MSK: full AROM bilateral UE/LE, pedal & radial pulses 2+   bilaterally  SKIN: warm & dry without rash, no pedal edema    Data   Results for orders placed or performed during the hospital   encounter of 11/21/19 (from the past 24 hour(s))   Basic metabolic panel   Result Value Ref Range    Sodium 142 133 - 144 mmol/L    Potassium 4.8 3.4 - 5.3 mmol/L    Chloride 116 (H) 94 - 109 mmol/L    Carbon Dioxide 20 20 - 32 mmol/L    Anion Gap 6 3 - 14 mmol/L    Glucose 96 70 - 99 mg/dL    Urea Nitrogen 68 (H) 7 - 30 mg/dL    Creatinine 3.38 (H) 0.66 - 1.25 mg/dL    GFR Estimate 18 (L) >60 mL/min/[1.73_m2]    GFR Estimate If Black 21 (L) >60 mL/min/[1.73_m2]    Calcium 8.8 8.5 - 10.1 mg/dL      Basic metabolic panel   Result Value Ref Range    Sodium 140 133 - 144 mmol/L    Potassium 4.8 3.4 - 5.3 mmol/L    Chloride 116 (H) 94 - 109 mmol/L    Carbon Dioxide 18 (L) 20 - 32 mmol/L    Anion Gap 6 3 - 14 mmol/L    Glucose 104 (H) 70 - 99 mg/dL    Urea Nitrogen 52 (H) 7 - 30 mg/dL    Creatinine 3.11 (H) 0.66 - 1.25 mg/dL    GFR Estimate 20 (L) >60 mL/min/[1.73_m2]    GFR Estimate If Black 23 (L) >60 mL/min/[1.73_m2]    Calcium 8.2 (L) 8.5 - 10.1 mg/dL   Hemoglobin   Result Value Ref Range    Hemoglobin 12.1 (L) 13.3 - 17.7 g/dL

## 2019-11-22 NOTE — PLAN OF CARE
Pt discharged home around 2pm today. A&Ox4, pleasant. VSS, on RA. Denies pain/nausea. Mayberry removed at 10am, voided ~300mL-red w/ small clots (urology aware); PVR <300. +BS and flatus, tolerating regular diet w/o difficulty. Up independently, ambulating in hallway. PIV removed. No meds filled at [facility]. Plans on following up w/ urology in 4-6weeks.

## 2019-11-22 NOTE — PLAN OF CARE
POD#0. Vitals stable. A&O. Ambulated halls x1 w/ SBA, independent in room. Denies pain/nausea. Tolerating regular diet. Mayberry patent w/ clear yellow out put, clear pink w/ ambulating. Plan for TOV tomorrow and discharge to home.

## 2019-11-22 NOTE — PLAN OF CARE
VSS. Refused capno- cont pulse ox on. Denied pain. Tolerating diet. BS hypoactive. Passing gas. Mayberry with pale pink-yellow UOP. Plan for TOV and discharge today.

## 2019-11-22 NOTE — PROVIDER NOTIFICATION
JEANETTE Cheng paged regarding no diet order. Also asked about CBI orders. Per nazanin, okay to not start CBI if urine remains clear pink/yellow. Orders placed for CBI if needed.

## 2019-11-22 NOTE — PROGRESS NOTES
[facility]    Medicine Progress Note - Hospitalist Service       Date of Admission:  11/21/2019  Assessment & Plan    Ras Esparza is a 64 year old male with a past medical history of milligan dependent bladder outlet obstruction and acute renal failure, cardiomyopathy, hypertension, and hyperlipidemia who underwent previously scheduled TURP with Dr. Germain. Hospitalist service was consulted postoperatively for co-medical management.     Bladder outlet obstruction, Milligan dependent   Stage 4 chronic kidney disease   Postoperative day # 1TURP   Admitted in 06/2019 with bladder outlet obstruction and Cr to 9.59, following milligan placement pt with improvement in Cr values to ~3. Follows closely with nephrology, urology as outpatient.  Creatinine is stable overnight, Milligan in place.  Postoperative management per primary service     Cardiomyopathy, presumed ischemic   Hypertension  Hyperlipidemia  Diagnosed during admission 06/2019. Seen and follows with cardiology as outpatient. TTE during that admission with reduced LV systolic function and EF 30%, moderately dilated LV and global hypokinesia. Seen in follow up with improvement in EF to 46%. Etiology of cardiomyopathy remains unknown, presumed ischemic. Nuclear stress 10/3/19 indicated partially reversible inferior and basal inferolateral defect suggestive of mild ischemia in Cx. As pt was asymptomatic, elected for med management.  Continues on PTA hydralazine 75mg TID, Coreg, Imdur 30mg daily     Anemia  Hemoglobin   Date Value Ref Range Status   10/31/2019 12.3 (L) 13.3 - 17.7 g/dL Final   Due to CKD. Hgb 12.3 preoperatively.    Repeat hemoglobin this morning     Diet: Regular Diet Adult    DVT Prophylaxis: Defer to primary service  Milligan Catheter: in place, indication: /GI/GYN Pelvic Procedure  Code Status: Full Code      Disposition Plan   Expected discharge:  Per primary service  Entered: Ernst Dyer MD 11/22/2019, 7:37 AM       The  patient's care was discussed with the Patient.    Ernst Dyer MD  Hospitalist Service  [facility]    ______________________________________________________________________    Interval History   No pain or complaints.    Data reviewed today: I reviewed all medications, new labs and imaging results over the last 24 hours. I personally reviewed no images or EKG's today.    Physical Exam   Vital Signs: Temp: 96.5  F (35.8  C) Temp src: Oral BP: 124/75 Pulse: 92 Heart Rate: 66 Resp: 18 SpO2: 98 % O2 Device: None (Room air) Oxygen Delivery: 2 LPM  Weight: 232 lbs 3.2 oz  Constitutional: awake, alert, cooperative, no apparent distress, and appears stated age  Respiratory: No increased work of breathing, good air exchange, clear to auscultation bilaterally, no crackles or wheezing  Cardiovascular: regular rate and rhythm, normal S1 and S2, no S3 or S4, and no murmur noted  GI: normal bowel sounds, soft, non-distended, non-tender  Neuropsychiatric: General: normal, calm and normal eye contact  +Mayberry     Data   Recent Labs   Lab 11/22/19  0642 11/21/19  0750    142   POTASSIUM 4.8 4.8   CHLORIDE 116* 116*   CO2 18* 20   BUN 52* 68*   CR 3.11* 3.38*   ANIONGAP 6 6   ELICIA 8.2* 8.8   * 96     No results found for this or any previous visit (from the past 24 hour(s)).  Medications       carvedilol  12.5 mg Oral BID w/meals     hydrALAZINE  75 mg Oral TID     isosorbide mononitrate  30 mg Oral Daily     magnesium oxide  400 mg Oral BID     sodium chloride (PF)  3 mL Intracatheter Q8H

## 2019-12-10 ENCOUNTER — OFFICE VISIT (OUTPATIENT)
Dept: CARDIOLOGY | Facility: CLINIC | Age: 64
End: 2019-12-10
Attending: NURSE PRACTITIONER
Payer: COMMERCIAL

## 2019-12-10 VITALS
HEIGHT: 72 IN | SYSTOLIC BLOOD PRESSURE: 131 MMHG | WEIGHT: 234 LBS | BODY MASS INDEX: 31.69 KG/M2 | DIASTOLIC BLOOD PRESSURE: 84 MMHG | HEART RATE: 65 BPM

## 2019-12-10 DIAGNOSIS — I50.22 CHRONIC SYSTOLIC HEART FAILURE (H): ICD-10-CM

## 2019-12-10 DIAGNOSIS — I10 BENIGN ESSENTIAL HYPERTENSION: ICD-10-CM

## 2019-12-10 DIAGNOSIS — I42.9 CARDIOMYOPATHY, UNSPECIFIED TYPE (H): Primary | ICD-10-CM

## 2019-12-10 DIAGNOSIS — N18.4 CKD (CHRONIC KIDNEY DISEASE) STAGE 4, GFR 15-29 ML/MIN (H): ICD-10-CM

## 2019-12-10 DIAGNOSIS — E78.5 HYPERLIPIDEMIA LDL GOAL <70: ICD-10-CM

## 2019-12-10 PROCEDURE — 99214 OFFICE O/P EST MOD 30 MIN: CPT | Performed by: NURSE PRACTITIONER

## 2019-12-10 RX ORDER — HYDRALAZINE HYDROCHLORIDE 100 MG/1
100 TABLET, FILM COATED ORAL 3 TIMES DAILY
Qty: 270 TABLET | Refills: 1 | Status: SHIPPED | OUTPATIENT
Start: 2019-12-10 | End: 2020-07-13

## 2019-12-10 ASSESSMENT — MIFFLIN-ST. JEOR: SCORE: 1891.48

## 2019-12-10 NOTE — PROGRESS NOTES
Cardiology Clinic Progress Note  Ras Esparza MRN# [id number]   YOB: 1955 Age: 64 year old   Primary Cardiologist: Dr. Adam  Reason for visit: cardiology follow up            Assessment and Plan:   Ras Esparza is a very pleasant 64 year old male with history of hypertension and tobacco use, who has not sought out routine care, recently hospitalizated 6/21/19-6/25/19 where he was diagnosed with HFrEF (likely ischemic), hypertension, acute kidney injury secondary to bladder outlet obstruction with bilateral hydronephrosis and hydroureter and anemia. Patient here today for cardiology follow up.     1.  Chronic systolic heart failure/HFrEF - Echo 10/15 shows improvement in LV function, EF 30% -> 46%, LV size normal, RV size and function. Etiology of cardiomyopathy unknown nuclear stress test completed which showed mild ischemia, no anginal symptoms, plan for continued medical management. Patient has been on optimal HF therapy. No ACEI/ARB or aldactone antagonist due to renal failure. Patient appears compensated and euvolemic today, weight has remained stable at home around 230#, but paitent did experience increase in weight over past few months which appears related to increased caloric intake now that patient is feeling better. Patient is not in clinical HF on exam. Will continue to optimize HF medications to ensure blood pressure control, increasing hydralazine to 100mg TID.   - NYHA class II, stage C   - Etiology : unknown, ? CAD/ICM mild ischemia on nuclear stress test, no anginal symptoms.    - Fluid status : euvolemic    - Goal weight: ~ 230#   - Diuretic regimen : none   - Last RHC : none   - Ischemic evaluation : Nuclear stress test showed mild ischemia, given no anginal symptoms and elevated creatinine decision made to continue medical management.    - Guideline directed medical therapy    - Betablocker: carvedilol 12.5mg BID    - ACEI/ARB/ARNI: none r/t LEIGHA/CKD    - Aldactone antagonist:  none r/t LEIGHA/CKD    - Continue isosorbide mononitrate 30mg daily    - INCREASE hydralazine 100mg TID   - Sudden cardiac death prophylaxis : N/A EF > 35%   - Reinforced HF education today, reviewed low sodium diet, reviewed when to notify clinic.     2. Chronic renal failure - secondary to bladder outlet obstruction with bilateral hydronephrosis and hydroureter, his initial creatinine was 9.59. Creatinine improved to 3.04 10/31/19 without needing dialysis. S/p TURP.    - Following with nephrology, saw nephrology today, recommended consideration for increase in hydralazine.     3. Hypertension - controlled, but elevated earlier at nephrology visit 150/99, blood pressure in clinic today 131/84. Plan to increase hydralazine today to optimize HF medications and control blood pressure.    - INCREASE hydralazine to 100mg TID   - Advised patient to bring BP log to follow up appointment   - Counseled patient on lifestyle modifications to help manage BP    4. Former tobacco use - quit tobacco use February 2019. Has remained smoking cessation.     5. Hyperlipidemia - patient notes he stopped taking his atorvastatin 2 months ago, lipid panel 9/2019 showed total cholesterol 116, HDL 46, LDL 57 and triglycerides 67. No known coronary disease. Patient wishes to remain off atorvastatin, plan to recheck cholesterol panel at next f/u appt, if cholesterol levels elevated will further consider restarting atorvastatin.    - Check lipid panel prior to next clinic visit        Changes today: INCREASE hydralazine to 100mg TID    Follow up plan:     Follow up with me 1 month        History of Presenting Illness:    Ras Esparza is a very pleasant 64 year old male with history of hypertension and tobacco use, who has not sought out routine care, recently hospitalization where he was diagnosed with HFrEF (likely ischemic), hypertension, acute kidney injury secondary to bladder outlet obstruction with bilateral hydronephrosis and  hydroureter and anemia.     Patient hospitalized 6/21/19-6/25/19 related to acute kidney injury secondary to bladder outlet obstruction with bilateral hydronephrosis and hydroureter, his initial creatinine was 9.59. Creatinine improved to 4.73 6/28/19 without needing dialysis. NTproBNP 45,492. Complaints of worsening exertional dyspnea, lower extremity edema and one episode of typical chest pain/diaphoresis lasting 2hrs approx 1-2 months ago. Echocardiogram shows EF 30% with severe global hypokinesis, LV moderately dilated, RV normal size/function, no significant valvular disease. Etiology of cardiomyopathy unknown at this time, ischemic cardiomyopathy not excluded. Coronary angiogram not pursued during hospital stay due to LEIGHA. Patient started on carvedilol, hydralazine and imdur. No ACEI/ARB or aldactone antagonist due to renal failure. Vascular calcifications noted on CT of abdomen/pelvis- started on statin therapy. Admission weight 256# Discharge weight 237#. Patient was discharged with milligan catheter and has been following with urology, underwent TURP on 11/21/19.    Patient was seen by Dr. Adam 9/30/19 at which point he was having no ischemic symptoms, clinically not in HF, did note presyncopal episodes for which event monitor was placed. Consideration for coronary angiogram was reviewed and ultimately decided to proceed with nuclear stress test. Blood pressure was elevated and hydralazine was increased. Echocardiogram and nuclear stress ordered.     Nuclear stress test 10/3 demonstrated partially reversible inferior and basal inferolateral defect. Small, reversible basal inferolateral defect suggests mild ischemia in the circumflex distribution. Dr. Adam recommends continued medical management given mild basal inferolateral ischemia, no anginal symptoms, and continued elevated creatinine. Echocardiogram 10/15 showed improvement in LV function with an EF of 46%, RV normal size and function, no significant  "valvular disease, mild aortic root dilatation (stable). Event monitor showed no arrhythmias.     I first met patient in November 2019 for cardiology follow up, at which point patient was doing well from a heart failure standpoint. His blood pressure was elevated in the 140s systolically, hydralazine was increased to 75mg TID. Saw nephrology today, no medication changes, blood pressure 150/90 at that clinic visit.     Patient is here today for cardiology follow up.     Patient reports feeling good. Weight has stabled out, reports typically 230#. Weight had increased but this was correlated with increased appetite. Energy levels are good, which is an improvement. Patient denies chest pain or chest tightness. Denies shortness of breath at rest. Denies exertional dyspnea, able to go up stairs and household chores with no difficulty. Denies lower extremity edema. Denies abdominal distention/bloating. Sleeping good. Denies orthopnea or PND. Denies dizziness, lightheadedness or other presyncopal symptoms, notes no recurrent symptoms. Denies tachycardia or palpitations. Denies episodes of bleeding, some blood in urine since TURP. Taking medications daily.     Labs from 11/22/19 show stable kidney function, creatinine 3.11, stable electrolytes. Hemoglobin 12.1. Blood pressure 131/84 and HR 65 in clinic today. Reports blood pressure at home typically 130s, but did not bring log with for review today.     Appetite good, \"too good\". Following renal diet closely. Limiting sodium intake. Eating meals at home, bringing lunch to work. Drinking 3 large cups coffee daily. No set exercise routine, getting activities with ADLs. Hope to join health club this next year after his urologic procedure. Denies alcohol use - quit > 1 year ago. Quit tobacco use February 2019. Has resumed working in IT.         Recent Hospitalizations   11/21/19-11/22/19 s/p TURP  6/21/19-6/25/19 related to acute kidney injury secondary to bladder outlet " obstruction with bilateral hydronephrosis and hydroureter, his initial creatinine was 9.59. Creatinine improved to 4.73 19 without needing dialysis. NTproBNP 45,492. New diagnosis of cardiomyopathy with LVEF 30%.         Social History    , 1 daughter, on disability. Enjoys fishing. Enjoy the outdoors.   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[employer])   Social Needs     Financial resource strain: Not on file     Food insecurity:     Worry: Not on file     Inability: Not on file     Transportation needs:     Medical: Not on file     Non-medical: Not on file   Tobacco Use     Smoking status: Former Smoker     Packs/day: 0.50     Years: 30.00     Pack years: 15.00     Types: Cigarettes     Last attempt to quit: 2019     Years since quittin.5     Smokeless tobacco: Never Used     Tobacco comment: 12 cigarettes per day   Substance and Sexual Activity     Alcohol use: Not Currently     Alcohol/week: 0.0 standard drinks     Comment: quit in 2018.      Drug use: Never     Sexual activity: Yes     Partners: Female   Lifestyle     Physical activity:     Days per week: Not on file     Minutes per session: Not on file     Stress: Not on file   Relationships     Social connections:     Talks on phone: Not on file     Gets together: Not on file     Attends Quaker service: Not on file     Active member of club or organization: Not on file     Attends meetings of clubs or organizations: Not on file     Relationship status: Not on file     Intimate partner violence:     Fear of current or ex partner: Not on file     Emotionally abused: Not on file     Physically abused: Not on file     Forced sexual activity: Not on file   Other Topics Concern     Parent/sibling w/ CABG, MI or angioplasty before 65F 55M?  "Not Asked   Social History Narrative     Not on file            Review of Systems:   Skin:  Negative     Eyes:  Positive for glasses  ENT:  Positive for postnasal drainage  Respiratory:  Negative    Cardiovascular:  Negative    Gastroenterology: Negative    Genitourinary:  Positive for    Musculoskeletal:  Negative back pain  Neurologic:  Negative    Psychiatric:  Negative    Heme/Lymph/Imm:  Negative weight loss  Endocrine:  Negative           Physical Exam:   Vitals: /84   Pulse 65   Ht 1.816 m (5' 11.5\")   Wt 106.1 kg (234 lb)   BMI 32.18 kg/m     Wt Readings from Last 4 Encounters:   12/10/19 106.1 kg (234 lb)   11/21/19 105.3 kg (232 lb 3.2 oz)   11/05/19 103.7 kg (228 lb 9.6 oz)   10/31/19 103.4 kg (228 lb)     GEN: well nourished, in no acute distress.  HEENT:  Pupils equal, round. Sclerae nonicteric.   NECK: Supple, no masses appreciated. No JVD appreciated on exam.   C/V:  Regular rate and rhythm, no murmur, rub or gallop.    RESP: Respirations are unlabored. Clear to auscultation bilaterally without wheezing, rales, or rhonchi.  GI: Abdomen soft, nontender.  : milligan catheter in place  EXTREM: No LE edema.  NEURO: Alert and oriented, cooperative.  SKIN: Warm and dry.        Data:   ECHO 10/15/19  Left ventricular systolic function is mildly reduced.  LVEF 46% based on biplane 2D tracing. Mild global hypokinesia of the left  ventricle.  The right ventricle is normal in structure, function and size.  No significant valvular abnormalities.  Mild aortic root dilatation. Max diameter of the visualized portion 4.2 cm.     This study was compared to a prior TTE from 6/22/2019. Global left ventricular  systolic funtion has improved. The aortic root is stable in size, previously  4.3cm, mildly dilated. The ascending aorta was previously measured at 4.1cm,  however it was measured at 3.7cm (within normal limits) on this present study.    Cardiac Event monitor - still in place    Nuclear stress test " 10/3/2019  1.  Myocardial perfusion imaging using single isotope technique  demonstrated partially reversible inferior and basal inferolateral  defect.   The small, reversible basal inferolateral defect suggests mild  ischemia in the circumflex distribution.   The fixed inferior defect is likely due to diaphragmatic attenuation.   2. Gated images demonstrated mildly dilated left ventricle with  borderline Global hypokinesis.  The left ventricular systolic function  is mildly reduced at 40% post stress.  3. Compared to the prior study from  .      LIVER ENZYME RESULTS:  Lab Results   Component Value Date    AST 15 06/21/2019    ALT 38 06/21/2019     CBC RESULTS:  Lab Results   Component Value Date    WBC 8.2 10/31/2019    RBC 3.94 (L) 10/31/2019    HGB 12.1 (L) 11/22/2019    HCT 37.7 (L) 10/31/2019    MCV 96 10/31/2019    MCH 31.2 10/31/2019    MCHC 32.6 10/31/2019    RDW 13.8 10/31/2019     10/31/2019     BMP RESULTS:  Lab Results   Component Value Date     11/22/2019    POTASSIUM 4.8 11/22/2019    CHLORIDE 116 (H) 11/22/2019    CO2 18 (L) 11/22/2019    ANIONGAP 6 11/22/2019     (H) 11/22/2019    BUN 52 (H) 11/22/2019    CR 3.11 (H) 11/22/2019    GFRESTIMATED 20 (L) 11/22/2019    GFRESTBLACK 23 (L) 11/22/2019    ELICIA 8.2 (L) 11/22/2019             Medications     Current Outpatient Medications   Medication Sig Dispense Refill     acetaminophen (TYLENOL) 325 MG tablet Take 2 tablets (650 mg) by mouth every 4 hours as needed for other (mild pain) 100 tablet 0     carvedilol (COREG) 12.5 MG tablet Take 1 tablet (12.5 mg) by mouth 2 times daily (with meals) 180 tablet 3     Ferrous Gluconate 240 (27 Fe) MG TABS Take 1 tablet by mouth daily        hydrALAZINE (APRESOLINE) 100 MG tablet Take 1 tablet (100 mg) by mouth 3 times daily 270 tablet 1     isosorbide mononitrate (IMDUR) 30 MG 24 hr tablet Take 1 tablet (30 mg) by mouth daily 90 tablet 3     magnesium oxide (MAG-OX) 400 MG tablet Take 1 tablet  (400 mg) by mouth 2 times daily 60 tablet 0     senna-docusate (SENOKOT-S/PERICOLACE) 8.6-50 MG tablet Take 1-2 tablets by mouth 2 times daily Take while on oral narcotics to prevent or treat constipation. 30 tablet 0     aspirin (ASA) 81 MG chewable tablet Take 1 tablet (81 mg) by mouth daily (Patient not taking: Reported on 12/10/2019) 30 tablet 11     methocarbamol (ROBAXIN) 750 MG tablet Take 1 tablet (750 mg) by mouth every 6 hours as needed for muscle spasms (muscle spasm) (Patient not taking: Reported on 12/10/2019) 60 tablet 0          Past Medical History     Past Medical History:   Diagnosis Date     Anemia, unspecified type 7/2/2019     Benign essential hypertension 11/5/2019     Bladder outflow obstruction 7/2/2019     Cardiomyopathy, unspecified type (H) 7/2/2019     CKD (chronic kidney disease) stage 4, GFR 15-29 ml/min (H) 7/2/2019     Hyperlipidemia LDL goal <70 8/25/2019     Tobacco abuse      Past Surgical History:   Procedure Laterality Date     LASER KTP GREEN LIGHT PHOTOSELECTIVE VAPORIZATION PROSTATE N/A 11/21/2019    Procedure: GREEN LIGHT LASER VAPORIZATION OF THE PROSTATE;  Surgeon: Lencho Germain MD;  Location:  OR     Family History   Problem Relation Age of Onset     Glaucoma Mother      Throat cancer Father      Rheumatoid Arthritis Sister      Alcoholism Brother      Liver Disease Brother             Allergies   Patient has no known allergies.        MAGNOLIA Sheikh [facility]  Pager: [phone]

## 2019-12-10 NOTE — PATIENT INSTRUCTIONS
1. INCREASE hydralazine to 100mg 3 x a day   - Current prescription, 100mg = 2 tablets, take 2 tablets 3 x a day   - New prescription, 100mg = 1 tablet, take 1 tablet 3 x a day  2. Monitor blood pressure at home, bring log with to clinic visit  3. Keep monitoring weight, call if worsening swelling or difficulty breathing  4. Follow up with Raquel in 1 month with labs prior  5. Please call with any additional questions/concerns [phone]

## 2019-12-10 NOTE — LETTER
12/10/2019    Krzysztof Thakur MD  [address] [facility] [address]    RE: Ras Esparza       Dear Colleague,    I had the pleasure of seeing Ras Esparza in the [facility].    Cardiology Clinic Progress Note  Ras Esparza MRN# [id number]   YOB: 1955 Age: 64 year old   Primary Cardiologist: Dr. Adam  Reason for visit: cardiology follow up            Assessment and Plan:   Ras Esparza is a very pleasant 64 year old male with history of hypertension and tobacco use, who has not sought out routine care, recently hospitalizated 6/21/19-6/25/19 where he was diagnosed with HFrEF (likely ischemic), hypertension, acute kidney injury secondary to bladder outlet obstruction with bilateral hydronephrosis and hydroureter and anemia. Patient here today for cardiology follow up.     1.  Chronic systolic heart failure/HFrEF - Echo 10/15 shows improvement in LV function, EF 30% -> 46%, LV size normal, RV size and function. Etiology of cardiomyopathy unknown nuclear stress test completed which showed mild ischemia, no anginal symptoms, plan for continued medical management. Patient has been on optimal HF therapy. No ACEI/ARB or aldactone antagonist due to renal failure. Patient appears compensated and euvolemic today, weight has remained stable at home around 230#, but paitent did experience increase in weight over past few months which appears related to increased caloric intake now that patient is feeling better. Patient is not in clinical HF on exam. Will continue to optimize HF medications to ensure blood pressure control, increasing hydralazine to 100mg TID.   - NYHA class II, stage C   - Etiology : unknown, ? CAD/ICM mild ischemia on nuclear stress test, no anginal symptoms.    - Fluid status : euvolemic    - Goal weight: ~ 230#   - Diuretic regimen : none   - Last RHC : none   - Ischemic evaluation : Nuclear stress test showed mild ischemia, given no anginal symptoms  and elevated creatinine decision made to continue medical management.    - Guideline directed medical therapy    - Betablocker: carvedilol 12.5mg BID    - ACEI/ARB/ARNI: none r/t LEIGHA/CKD    - Aldactone antagonist: none r/t LEIGHA/CKD    - Continue isosorbide mononitrate 30mg daily    - INCREASE hydralazine 100mg TID   - Sudden cardiac death prophylaxis : N/A EF > 35%   - Reinforced HF education today, reviewed low sodium diet, reviewed when to notify clinic.     2. Chronic renal failure - secondary to bladder outlet obstruction with bilateral hydronephrosis and hydroureter, his initial creatinine was 9.59. Creatinine improved to 3.04 10/31/19 without needing dialysis. S/p TURP.    - Following with nephrology, saw nephrology today, recommended consideration for increase in hydralazine.     3. Hypertension - controlled, but elevated earlier at nephrology visit 150/99, blood pressure in clinic today 131/84. Plan to increase hydralazine today to optimize HF medications and control blood pressure.    - INCREASE hydralazine to 100mg TID   - Advised patient to bring BP log to follow up appointment   - Counseled patient on lifestyle modifications to help manage BP    4. Former tobacco use - quit tobacco use February 2019. Has remained smoking cessation.     5. Hyperlipidemia - patient notes he stopped taking his atorvastatin 2 months ago, lipid panel 9/2019 showed total cholesterol 116, HDL 46, LDL 57 and triglycerides 67. No known coronary disease. Patient wishes to remain off atorvastatin, plan to recheck cholesterol panel at next f/u appt, if cholesterol levels elevated will further consider restarting atorvastatin.    - Check lipid panel prior to next clinic visit        Changes today: INCREASE hydralazine to 100mg TID    Follow up plan:     Follow up with me 1 month        History of Presenting Illness:    Ras Esparza is a very pleasant 64 year old male with history of hypertension and tobacco use, who has not sought  out routine care, recently hospitalization where he was diagnosed with HFrEF (likely ischemic), hypertension, acute kidney injury secondary to bladder outlet obstruction with bilateral hydronephrosis and hydroureter and anemia.     Patient hospitalized 6/21/19-6/25/19 related to acute kidney injury secondary to bladder outlet obstruction with bilateral hydronephrosis and hydroureter, his initial creatinine was 9.59. Creatinine improved to 4.73 6/28/19 without needing dialysis. NTproBNP 45,492. Complaints of worsening exertional dyspnea, lower extremity edema and one episode of typical chest pain/diaphoresis lasting 2hrs approx 1-2 months ago. Echocardiogram shows EF 30% with severe global hypokinesis, LV moderately dilated, RV normal size/function, no significant valvular disease. Etiology of cardiomyopathy unknown at this time, ischemic cardiomyopathy not excluded. Coronary angiogram not pursued during hospital stay due to LEIGHA. Patient started on carvedilol, hydralazine and imdur. No ACEI/ARB or aldactone antagonist due to renal failure. Vascular calcifications noted on CT of abdomen/pelvis- started on statin therapy. Admission weight 256# Discharge weight 237#. Patient was discharged with milligan catheter and has been following with urology, underwent TURP on 11/21/19.    Patient was seen by Dr. Adam 9/30/19 at which point he was having no ischemic symptoms, clinically not in HF, did note presyncopal episodes for which event monitor was placed. Consideration for coronary angiogram was reviewed and ultimately decided to proceed with nuclear stress test. Blood pressure was elevated and hydralazine was increased. Echocardiogram and nuclear stress ordered.     Nuclear stress test 10/3 demonstrated partially reversible inferior and basal inferolateral defect. Small, reversible basal inferolateral defect suggests mild ischemia in the circumflex distribution. Dr. Adam recommends continued medical management given mild  "basal inferolateral ischemia, no anginal symptoms, and continued elevated creatinine. Echocardiogram 10/15 showed improvement in LV function with an EF of 46%, RV normal size and function, no significant valvular disease, mild aortic root dilatation (stable). Event monitor showed no arrhythmias.     I first met patient in November 2019 for cardiology follow up, at which point patient was doing well from a heart failure standpoint. His blood pressure was elevated in the 140s systolically, hydralazine was increased to 75mg TID. Saw nephrology today, no medication changes, blood pressure 150/90 at that clinic visit.     Patient is here today for cardiology follow up.     Patient reports feeling good. Weight has stabled out, reports typically 230#. Weight had increased but this was correlated with increased appetite. Energy levels are good, which is an improvement. Patient denies chest pain or chest tightness. Denies shortness of breath at rest. Denies exertional dyspnea, able to go up stairs and household chores with no difficulty. Denies lower extremity edema. Denies abdominal distention/bloating. Sleeping good. Denies orthopnea or PND. Denies dizziness, lightheadedness or other presyncopal symptoms, notes no recurrent symptoms. Denies tachycardia or palpitations. Denies episodes of bleeding, some blood in urine since TURP. Taking medications daily.     Labs from 11/22/19 show stable kidney function, creatinine 3.11, stable electrolytes. Hemoglobin 12.1. Blood pressure 131/84 and HR 65 in clinic today. Reports blood pressure at home typically 130s, but did not bring log with for review today.     Appetite good, \"too good\". Following renal diet closely. Limiting sodium intake. Eating meals at home, bringing lunch to work. Drinking 3 large cups coffee daily. No set exercise routine, getting activities with ADLs. Hope to join health club this next year after his urologic procedure. Denies alcohol use - quit > 1 year ago. " Quit tobacco use 2019. Has resumed working in IT.         Recent Hospitalizations   19-19 s/p TURP  19-19 related to acute kidney injury secondary to bladder outlet obstruction with bilateral hydronephrosis and hydroureter, his initial creatinine was 9.59. Creatinine improved to 4.73 19 without needing dialysis. NTproBNP 45,492. New diagnosis of cardiomyopathy with LVEF 30%.         Social History    , 1 daughter, on disability. Enjoys fishing. Enjoy the outdoors.   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IT)   Social Needs     Financial resource strain: Not on file     Food insecurity:     Worry: Not on file     Inability: Not on file     Transportation needs:     Medical: Not on file     Non-medical: Not on file   Tobacco Use     Smoking status: Former Smoker     Packs/day: 0.50     Years: 30.00     Pack years: 15.00     Types: Cigarettes     Last attempt to quit: 2019     Years since quittin.5     Smokeless tobacco: Never Used     Tobacco comment: 12 cigarettes per day   Substance and Sexual Activity     Alcohol use: Not Currently     Alcohol/week: 0.0 standard drinks     Comment: quit in .      Drug use: Never     Sexual activity: Yes     Partners: Female   Lifestyle     Physical activity:     Days per week: Not on file     Minutes per session: Not on file     Stress: Not on file   Relationships     Social connections:     Talks on phone: Not on file     Gets together: Not on file     Attends L[employer]-day service: Not on file     Active member of club or organization: Not on file     Attends meetings of clubs or organizations: Not on file     Relationship status: Not on file     Intimate partner violence:     Fear of current or ex partner: Not on file  "    Emotionally abused: Not on file     Physically abused: Not on file     Forced sexual activity: Not on file   Other Topics Concern     Parent/sibling w/ CABG, MI or angioplasty before 65F 55M? Not Asked   Social History Narrative     Not on file            Review of Systems:   Skin:  Negative     Eyes:  Positive for glasses  ENT:  Positive for postnasal drainage  Respiratory:  Negative    Cardiovascular:  Negative    Gastroenterology: Negative    Genitourinary:  Positive for    Musculoskeletal:  Negative back pain  Neurologic:  Negative    Psychiatric:  Negative    Heme/Lymph/Imm:  Negative weight loss  Endocrine:  Negative           Physical Exam:   Vitals: /84   Pulse 65   Ht 1.816 m (5' 11.5\")   Wt 106.1 kg (234 lb)   BMI 32.18 kg/m      Wt Readings from Last 4 Encounters:   12/10/19 106.1 kg (234 lb)   11/21/19 105.3 kg (232 lb 3.2 oz)   11/05/19 103.7 kg (228 lb 9.6 oz)   10/31/19 103.4 kg (228 lb)     GEN: well nourished, in no acute distress.  HEENT:  Pupils equal, round. Sclerae nonicteric.   NECK: Supple, no masses appreciated. No JVD appreciated on exam.   C/V:  Regular rate and rhythm, no murmur, rub or gallop.    RESP: Respirations are unlabored. Clear to auscultation bilaterally without wheezing, rales, or rhonchi.  GI: Abdomen soft, nontender.  : milligan catheter in place  EXTREM: No LE edema.  NEURO: Alert and oriented, cooperative.  SKIN: Warm and dry.        Data:   ECHO 10/15/19  Left ventricular systolic function is mildly reduced.  LVEF 46% based on biplane 2D tracing. Mild global hypokinesia of the left  ventricle.  The right ventricle is normal in structure, function and size.  No significant valvular abnormalities.  Mild aortic root dilatation. Max diameter of the visualized portion 4.2 cm.     This study was compared to a prior TTE from 6/22/2019. Global left ventricular  systolic funtion has improved. The aortic root is stable in size, previously  4.3cm, mildly dilated. The " ascending aorta was previously measured at 4.1cm,  however it was measured at 3.7cm (within normal limits) on this present study.    Cardiac Event monitor - still in place    Nuclear stress test 10/3/2019  1.  Myocardial perfusion imaging using single isotope technique  demonstrated partially reversible inferior and basal inferolateral  defect.   The small, reversible basal inferolateral defect suggests mild  ischemia in the circumflex distribution.   The fixed inferior defect is likely due to diaphragmatic attenuation.   2. Gated images demonstrated mildly dilated left ventricle with  borderline Global hypokinesis.  The left ventricular systolic function  is mildly reduced at 40% post stress.  3. Compared to the prior study from  .      LIVER ENZYME RESULTS:  Lab Results   Component Value Date    AST 15 06/21/2019    ALT 38 06/21/2019     CBC RESULTS:  Lab Results   Component Value Date    WBC 8.2 10/31/2019    RBC 3.94 (L) 10/31/2019    HGB 12.1 (L) 11/22/2019    HCT 37.7 (L) 10/31/2019    MCV 96 10/31/2019    MCH 31.2 10/31/2019    MCHC 32.6 10/31/2019    RDW 13.8 10/31/2019     10/31/2019     BMP RESULTS:  Lab Results   Component Value Date     11/22/2019    POTASSIUM 4.8 11/22/2019    CHLORIDE 116 (H) 11/22/2019    CO2 18 (L) 11/22/2019    ANIONGAP 6 11/22/2019     (H) 11/22/2019    BUN 52 (H) 11/22/2019    CR 3.11 (H) 11/22/2019    GFRESTIMATED 20 (L) 11/22/2019    GFRESTBLACK 23 (L) 11/22/2019    ELICIA 8.2 (L) 11/22/2019             Medications     Current Outpatient Medications   Medication Sig Dispense Refill     acetaminophen (TYLENOL) 325 MG tablet Take 2 tablets (650 mg) by mouth every 4 hours as needed for other (mild pain) 100 tablet 0     carvedilol (COREG) 12.5 MG tablet Take 1 tablet (12.5 mg) by mouth 2 times daily (with meals) 180 tablet 3     Ferrous Gluconate 240 (27 Fe) MG TABS Take 1 tablet by mouth daily        hydrALAZINE (APRESOLINE) 100 MG tablet Take 1 tablet (100 mg)  by mouth 3 times daily 270 tablet 1     isosorbide mononitrate (IMDUR) 30 MG 24 hr tablet Take 1 tablet (30 mg) by mouth daily 90 tablet 3     magnesium oxide (MAG-OX) 400 MG tablet Take 1 tablet (400 mg) by mouth 2 times daily 60 tablet 0     senna-docusate (SENOKOT-S/PERICOLACE) 8.6-50 MG tablet Take 1-2 tablets by mouth 2 times daily Take while on oral narcotics to prevent or treat constipation. 30 tablet 0     aspirin (ASA) 81 MG chewable tablet Take 1 tablet (81 mg) by mouth daily (Patient not taking: Reported on 12/10/2019) 30 tablet 11     methocarbamol (ROBAXIN) 750 MG tablet Take 1 tablet (750 mg) by mouth every 6 hours as needed for muscle spasms (muscle spasm) (Patient not taking: Reported on 12/10/2019) 60 tablet 0          Past Medical History     Past Medical History:   Diagnosis Date     Anemia, unspecified type 7/2/2019     Benign essential hypertension 11/5/2019     Bladder outflow obstruction 7/2/2019     Cardiomyopathy, unspecified type (H) 7/2/2019     CKD (chronic kidney disease) stage 4, GFR 15-29 ml/min (H) 7/2/2019     Hyperlipidemia LDL goal <70 8/25/2019     Tobacco abuse      Past Surgical History:   Procedure Laterality Date     LASER KTP GREEN LIGHT PHOTOSELECTIVE VAPORIZATION PROSTATE N/A 11/21/2019    Procedure: GREEN LIGHT LASER VAPORIZATION OF THE PROSTATE;  Surgeon: Lencho Germain MD;  Location:  OR     Family History   Problem Relation Age of Onset     Glaucoma Mother      Throat cancer Father      Rheumatoid Arthritis Sister      Alcoholism Brother      Liver Disease Brother             Allergies   Patient has no known allergies.        MAGNOLIA Sheikh CNP  [facility]  Pager: [phone]      Thank you for allowing me to participate in the care of your patient.    Sincerely,     MAGNOLIA Sheikh CNP     [facility]

## 2019-12-11 ENCOUNTER — HOSPITAL ENCOUNTER (OUTPATIENT)
Dept: ULTRASOUND IMAGING | Facility: CLINIC | Age: 64
Discharge: HOME | End: 2019-12-11
Attending: FAMILY MEDICINE | Admitting: FAMILY MEDICINE
Payer: COMMERCIAL

## 2019-12-11 ENCOUNTER — OFFICE VISIT (OUTPATIENT)
Dept: URGENT CARE | Facility: URGENT CARE | Age: 64
End: 2019-12-11
Payer: COMMERCIAL

## 2019-12-11 VITALS
SYSTOLIC BLOOD PRESSURE: 162 MMHG | OXYGEN SATURATION: 100 % | DIASTOLIC BLOOD PRESSURE: 90 MMHG | WEIGHT: 234 LBS | RESPIRATION RATE: 16 BRPM | TEMPERATURE: 97.8 F | BODY MASS INDEX: 32.18 KG/M2 | HEART RATE: 71 BPM

## 2019-12-11 DIAGNOSIS — I10 BENIGN ESSENTIAL HYPERTENSION: ICD-10-CM

## 2019-12-11 DIAGNOSIS — K40.90 UNILATERAL INGUINAL HERNIA WITHOUT OBSTRUCTION OR GANGRENE, RECURRENCE NOT SPECIFIED: ICD-10-CM

## 2019-12-11 DIAGNOSIS — I10 BENIGN ESSENTIAL HYPERTENSION: Primary | ICD-10-CM

## 2019-12-11 DIAGNOSIS — N50.819 TESTICULAR PAIN: ICD-10-CM

## 2019-12-11 DIAGNOSIS — N50.89 SWELLING OF LEFT TESTICLE: ICD-10-CM

## 2019-12-11 PROCEDURE — 93976 VASCULAR STUDY: CPT

## 2019-12-11 PROCEDURE — 99214 OFFICE O/P EST MOD 30 MIN: CPT | Performed by: FAMILY MEDICINE

## 2019-12-11 RX ORDER — CIPROFLOXACIN 250 MG/1
250 TABLET, FILM COATED ORAL DAILY
Qty: 5 TABLET | Refills: 0 | Status: SHIPPED | OUTPATIENT
Start: 2019-12-11 | End: 2019-12-11

## 2019-12-11 NOTE — PROGRESS NOTES
SUBJECTIVE:   Ras Esparza is a 64 year old male with history of anemia, hypertension, chronic kidney disease, hyperlipidemia, recently had TURP 2 weeks back just he was told that he would continue noticing bleeding for at least 4 weeks.  But for last 1 day he suddenly noticed that his left testicle is swollen and then there is tenderness in the inferior aspect of the left testicle.  He denies any injury denies any pain with urination more than his baseline he does have some pain following his surgery but denies any worsening of the pain.  Patient has been otherwise feeling fine.  Has no fever or chills.  His blood pressure was noted to be elevated patient does have history of high blood pressure current  elevated blood pressure to the pain presenting could be related to the pain  He is an established patient of [facility].  Onset of symptoms was 1 day(s) ago.  Course of illness is same.    Severity moderate  Current and Associated symptoms: left testicular pain and swelling   Treatment measures tried include None tried.  Predisposing factors include None.    Past Medical History:   Diagnosis Date     Anemia, unspecified type 7/2/2019     Benign essential hypertension 11/5/2019     Bladder outflow obstruction 7/2/2019     Cardiomyopathy, unspecified type (H) 7/2/2019     CKD (chronic kidney disease) stage 4, GFR 15-29 ml/min (H) 7/2/2019     Hyperlipidemia LDL goal <70 8/25/2019     Tobacco abuse      Current Outpatient Medications   Medication Sig Dispense Refill     acetaminophen (TYLENOL) 325 MG tablet Take 2 tablets (650 mg) by mouth every 4 hours as needed for other (mild pain) 100 tablet 0     carvedilol (COREG) 12.5 MG tablet Take 1 tablet (12.5 mg) by mouth 2 times daily (with meals) 180 tablet 3     Ferrous Gluconate 240 (27 Fe) MG TABS Take 1 tablet by mouth daily        hydrALAZINE (APRESOLINE) 100 MG tablet Take 1 tablet (100 mg) by mouth 3 times daily 270 tablet 1     isosorbide mononitrate (IMDUR) 30  MG 24 hr tablet Take 1 tablet (30 mg) by mouth daily 90 tablet 3     magnesium oxide (MAG-OX) 400 MG tablet Take 1 tablet (400 mg) by mouth 2 times daily 60 tablet 0     senna-docusate (SENOKOT-S/PERICOLACE) 8.6-50 MG tablet Take 1-2 tablets by mouth 2 times daily Take while on oral narcotics to prevent or treat constipation. 30 tablet 0     aspirin (ASA) 81 MG chewable tablet Take 1 tablet (81 mg) by mouth daily (Patient not taking: Reported on 12/10/2019) 30 tablet 11     methocarbamol (ROBAXIN) 750 MG tablet Take 1 tablet (750 mg) by mouth every 6 hours as needed for muscle spasms (muscle spasm) (Patient not taking: Reported on 12/10/2019) 60 tablet 0     Social History     Tobacco Use     Smoking status: Former Smoker     Packs/day: 0.50     Years: 30.00     Pack years: 15.00     Types: Cigarettes     Last attempt to quit: 2019     Years since quittin.5     Smokeless tobacco: Never Used     Tobacco comment: 12 cigarettes per day   Substance Use Topics     Alcohol use: Not Currently     Alcohol/week: 0.0 standard drinks     Comment: quit in 2018.      Family History   Problem Relation Age of Onset     Glaucoma Mother      Throat cancer Father      Rheumatoid Arthritis Sister      Alcoholism Brother      Liver Disease Brother          ROS:    10 point ROS of systems including Constitutional, Eyes, Respiratory, Cardiovascular, Gastroenterology, ntegumentary, Muscularskeletal, Psychiatric were all negative except for pertinent positives noted in my HPI       OBJECTIVE:  BP (!) 162/90   Pulse 71   Temp 97.8  F (36.6  C) (Oral)   Resp 16   Wt 106.1 kg (234 lb)   SpO2 100%   BMI 32.18 kg/m    GENERAL APPEARANCE: healthy, alert and no distress  EYES: EOMI,  PERRL, conjunctiva clear  HENT: ear canals and TM's normal.  Nose and mouth without ulcers, erythema or lesions  NECK: supple, nontender, no lymphadenopathy  RESP: lungs clear to auscultation - no rales, rhonchi or wheezes  CV: regular rates and  rhythm, normal S1 S2, no murmur noted  GU_male: testicles normal without  masses but left testicle looks swollen and there is definite tenderness in the inferior aspect of the left testicle over the epididymis area penis normal without urethral discharge, there was also tenderness in the left groin area   SKIN: no suspicious lesions or rashes  Physical Exam      X-Ray was not done.    Medical Decision Making:    Differential Diagnosis:  Testicular torsion/testicular tumor UTI/kidney stone/      ASSESSMENT:  Ras was seen today for groin swelling.    Diagnoses and all orders for this visit:    Benign essential hypertension  -     Cancel: US Testicular and Scrotum; Future  -     US Testicular & Scrotum w Doppler Ltd; Future    Testicular pain  -     Cancel: US Testicular and Scrotum; Future  -     US Testicular & Scrotum w Doppler Ltd; Future    Swelling of left testicle  -     Cancel: US Testicular and Scrotum; Future  -     US Testicular & Scrotum w Doppler Ltd; Future          PLAN:  .  I did discuss with patient it seems to be related to epididymitis but will get an ultrasound to rule out any torsion or any tumor.  Patient did understand and agree with plan an appointment is being scheduled for the patient at [facility] and patient will be following up there for it  See orders in Epic

## 2019-12-13 ENCOUNTER — PREP FOR PROCEDURE (OUTPATIENT)
Dept: SURGERY | Facility: CLINIC | Age: 64
End: 2019-12-13

## 2019-12-13 ENCOUNTER — TELEPHONE (OUTPATIENT)
Dept: SURGERY | Facility: CLINIC | Age: 64
End: 2019-12-13

## 2019-12-13 ENCOUNTER — OFFICE VISIT (OUTPATIENT)
Dept: SURGERY | Facility: CLINIC | Age: 64
End: 2019-12-13
Payer: COMMERCIAL

## 2019-12-13 VITALS
HEART RATE: 68 BPM | HEIGHT: 72 IN | WEIGHT: 234 LBS | SYSTOLIC BLOOD PRESSURE: 128 MMHG | BODY MASS INDEX: 31.69 KG/M2 | OXYGEN SATURATION: 99 % | DIASTOLIC BLOOD PRESSURE: 88 MMHG | RESPIRATION RATE: 16 BRPM

## 2019-12-13 DIAGNOSIS — K40.90 LEFT INGUINAL HERNIA: Primary | ICD-10-CM

## 2019-12-13 PROCEDURE — 99203 OFFICE O/P NEW LOW 30 MIN: CPT | Performed by: SURGERY

## 2019-12-13 ASSESSMENT — MIFFLIN-ST. JEOR: SCORE: 1891.48

## 2019-12-13 NOTE — LETTER
"2019    RE: Ras Esparza, : 1955      Ras is a 64 year old male who presents for evaluation for inguinal hernia.  Symptoms began earlier this month when he noticed swelling in the left scrotum after having a TURP. This is described as dull and aching and is located in the left groin.  The patient has noticed a bulge. He was seen at an urgent care and was told that he has a hernia by ultrasound with a possible hydrocele. Pt has not had previous abdominal surgery. 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 He states that he was likely straining often before his recent TURP.     Constipation No   Dysuria No  Cough No     Pt's chart has been reviewed for PMH, PSH, allergies, medications, social history and family history.     ROS:  Pulm:  No shortness of breath, dyspnea on exertion, cough, or hemoptysis  CV:  negative  ABD:  See chief complaint  :  Negative  All other systems negative/normal     /88 (BP Location: Left arm, Patient Position: Sitting, Cuff Size: Adult Regular)   Pulse 68   Resp 16   Ht 1.816 m (5' 11.5\")   Wt 106.1 kg (234 lb)   SpO2 99%   BMI 32.18 kg/m    Physical exam:  Patient able to get up on table without difficulty.  Abdomen is abdomen is soft without significant tenderness, masses, organomegaly or guarding  bowel sounds are positive and no caput medusa noted.  Hernia  Left inguinal hernia is present with valsalva              Right inguinal hernia is not present with valsalva              The hernia is reducible              Testicles are normal     Assesment: left inguinal hernia     Plan: The nature of hernias, anatomic considerations, indications and approaches to repair were discussed.  Risks of operative intervention were discussed including infection, bleeding, harm to structures including vessels and nerves as well as recurrence, which is about 5% per decade of life.  Post operative recuperation and activity limitations were " reviewed as well.  The patient is interested in pursuing repair and we will assist in scheduling this with him.        Rao Berry MD

## 2019-12-13 NOTE — TELEPHONE ENCOUNTER
Type of surgery: OPEN REPAIR LEFT INGUIANL HERNIA WITH MESH   Location of surgery: [facility]  Date and time of surgery: 12/19/2019 @ 12:30 PM   Surgeon: MEHDI HOUSE MD    Pre-Op Appt Date: PATIENT TO SCHEDULE    Post-Op Appt Date: PATIENT TO SCHEDULE     Packet sent out: Yes PACKET AND SOAP GIVEN TO PATIENT    Pre-cert/Authorization completed:  Not Applicable  Date: 12/13/2019       OPEN REPAIR LEFT INGUIANL HERNIA WITH MESH    GENERAL PT INST TO HAVE H&P WITH DR LEWIS  60 MIN REQ PA ASSIST RMO  NMS

## 2019-12-13 NOTE — PROGRESS NOTES
"Ras is a 64 year old male who presents for evaluation for inguinal hernia.  Symptoms began earlier this month when he noticed swelling in the left scrotum after having a TURP. This is described as dull and aching and is located in the left groin.  The patient has noticed a bulge. He was seen at an urgent care and was told that he has a hernia by ultrasound with a possible hydrocele. Pt has not had previous abdominal surgery. 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 He states that he was likely straining often before his recent TURP.    Constipation No   Dysuria No  Cough No    Pt's chart has been reviewed for PMH, PSH, allergies, medications, social history and family history.    ROS:  Pulm:  No shortness of breath, dyspnea on exertion, cough, or hemoptysis  CV:  negative  ABD:  See chief complaint  :  Negative  All other systems negative/normal    /88 (BP Location: Left arm, Patient Position: Sitting, Cuff Size: Adult Regular)   Pulse 68   Resp 16   Ht 1.816 m (5' 11.5\")   Wt 106.1 kg (234 lb)   SpO2 99%   BMI 32.18 kg/m    Physical exam:  Patient able to get up on table without difficulty.  Abdomen is abdomen is soft without significant tenderness, masses, organomegaly or guarding  bowel sounds are positive and no caput medusa noted.  Hernia  Left inguinal hernia is present with valsalva              Right inguinal hernia is not present with valsalva              The hernia is reducible              Testicles are normal    Assesment: left inguinal hernia    Plan: The nature of hernias, anatomic considerations, indications and approaches to repair were discussed.  Risks of operative intervention were discussed including infection, bleeding, harm to structures including vessels and nerves as well as recurrence, which is about 5% per decade of life.  Post operative recuperation and activity limitations were reviewed as well.  The patient is interested in pursuing repair and we will " assist in scheduling this with him.      Rao Berry MD  12/13/2019 1:18 PM    Please route or send letter to:  Primary Care Provider (PCP)

## 2019-12-17 ENCOUNTER — OFFICE VISIT (OUTPATIENT)
Dept: INTERNAL MEDICINE | Facility: CLINIC | Age: 64
End: 2019-12-17
Payer: COMMERCIAL

## 2019-12-17 VITALS
RESPIRATION RATE: 18 BRPM | TEMPERATURE: 97.6 F | OXYGEN SATURATION: 99 % | BODY MASS INDEX: 32.18 KG/M2 | WEIGHT: 234 LBS | DIASTOLIC BLOOD PRESSURE: 74 MMHG | HEART RATE: 62 BPM | SYSTOLIC BLOOD PRESSURE: 112 MMHG

## 2019-12-17 DIAGNOSIS — D64.9 ANEMIA, UNSPECIFIED TYPE: ICD-10-CM

## 2019-12-17 DIAGNOSIS — K40.90 LEFT INGUINAL HERNIA: ICD-10-CM

## 2019-12-17 DIAGNOSIS — I42.9 CARDIOMYOPATHY, UNSPECIFIED TYPE (H): ICD-10-CM

## 2019-12-17 DIAGNOSIS — Z01.818 PREOP GENERAL PHYSICAL EXAM: Primary | ICD-10-CM

## 2019-12-17 DIAGNOSIS — E78.5 HYPERLIPIDEMIA LDL GOAL <70: ICD-10-CM

## 2019-12-17 DIAGNOSIS — I10 BENIGN ESSENTIAL HYPERTENSION: ICD-10-CM

## 2019-12-17 DIAGNOSIS — N18.4 CKD (CHRONIC KIDNEY DISEASE) STAGE 4, GFR 15-29 ML/MIN (H): ICD-10-CM

## 2019-12-17 PROCEDURE — 99215 OFFICE O/P EST HI 40 MIN: CPT | Performed by: PHYSICIAN ASSISTANT

## 2019-12-17 NOTE — PATIENT INSTRUCTIONS
Stop any aspirin, advil ( ibuprofen )or aleve one week prior to surgery     Take your usual morning medications with a sip of water.    Do not take any supplements or vitamins the morning of surgery     Before Your Surgery      Call your surgeon if there is any change in your health. This includes signs of a cold or flu (such as a sore throat, runny nose, cough, rash or fever).    Do not smoke, drink alcohol or take over the counter medicine (unless your surgeon or primary care doctor tells you to) for the 24 hours before and after surgery.    If you take prescribed drugs: Follow your doctor s orders about which medicines to take and which to stop until after surgery.    Eating and drinking prior to surgery: follow the instructions from your surgeon    Take a shower or bath the night before surgery. Use the soap your surgeon gave you to gently clean your skin. If you do not have soap from your surgeon, use your regular soap. Do not shave or scrub the surgery site.  Wear clean pajamas and have clean sheets on your bed.

## 2019-12-17 NOTE — PROGRESS NOTES
[address] [address]  [phone]  Dept: [phone]    PRE-OP EVALUATION:  Today's date: 2019    Ras Esparza (: 1955) presents for pre-operative evaluation assessment as requested by Dr. Schmidt.  He requires evaluation and anesthesia risk assessment prior to undergoing surgery/procedure for treatment of Left Inguinal Hernia repair .    Fax number for surgical facility: [facility]  Primary Physician: Krzysztof Thakur  Type of Anesthesia Anticipated: General    Patient has a Health Care Directive or Living Will:  NO    Preop Questions 2019   Who is doing your surgery? [email or web address]   What are you having done? hernia   Date of Surgery/Procedure: 2019   Facility or Hospital where procedure/surgery will be performed: [facility]   1.  Do you have a history of Heart attack, stroke, stent, coronary bypass surgery, or other heart surgery? YES  - Was told he might have had one-  Followed by cardiology with recent visit    2.  Do you ever have any pain or discomfort in your chest? No   3.  Do you have a history of  Heart Failure? No   4.   Are you troubled by shortness of breath when:  walking on a level surface, or up a slight hill, or at night? No   5.  Do you currently have a cold, bronchitis or other respiratory infection? No   6.  Do you have a cough, shortness of breath, or wheezing? No   7.  Do you sometimes get pains in the calves of your legs when you walk? No   8. Do you or anyone in your family have previous history of blood clots? No   9.  Do you or does anyone in your family have a serious bleeding problem such as prolonged bleeding following surgeries or cuts? No   10. Have you ever had problems with anemia or been told to take iron pills? YES - Related to CKD   11. Have you had any abnormal blood loss such as black, tarry or bloody stools? No   12. Have you ever had a blood transfusion? No   13. Have you or any of  your relatives ever had problems with anesthesia? No   14. Do you have sleep apnea, excessive snoring or daytime drowsiness? No   15. Do you have any prosthetic heart valves? No   16. Do you have prosthetic joints? No         HPI:     HPI related to upcoming procedure: left inguinal hernia - dx with UC visit and ultrasound.Referral to general surgery         Cardiomyopathy - Patient has a history of cardiomyopathy. Currently the patient's condition is same. Current treatment regimen includes long acting nitrate and Coreg. The patient denies chest pain, edema, orthopnea, SOB or recent weight gain. Last Echocardiogram 10/15/2019, EKG 10/31/2019.     HYPERLIPIDEMIA - Patient has a long history of significant Hyperlipidemia requiring medication for treatment with recent good control. Patient reports no problems or side effects with the medication.     HYPERTENSION - Patient has longstanding history of HTN , currently denies any symptoms referable to elevated blood pressure. Specifically denies chest pain, palpitations, dyspnea, orthopnea, PND or peripheral edema. Blood pressure readings have been in normal range. Current medication regimen is as listed below. Patient denies any side effects of medication.     RENAL INSUFFICIENCY - Patient has a longstanding history of moderate-severe chronic renal insufficiency. Last Cr last week with Nephrology   See labs below .       MEDICAL HISTORY:     Patient Active Problem List    Diagnosis Date Noted     Left inguinal hernia 12/13/2019     Priority: Medium     Added automatically from request for surgery [id number]       Hematuria 11/21/2019     Priority: Medium     Benign essential hypertension 11/05/2019     Priority: Medium     Hyperlipidemia LDL goal <70 08/25/2019     Priority: Medium     Cardiomyopathy, unspecified type (H) 07/02/2019     Priority: Medium     Bladder outflow obstruction 07/02/2019     Priority: Medium     CKD (chronic kidney disease) stage 4, GFR 15-29 ml/min  (H) 2019     Priority: Medium     Anemia, unspecified type 2019     Priority: Medium      Past Medical History:   Diagnosis Date     Anemia, unspecified type 2019     Benign essential hypertension 2019     Bladder outflow obstruction 2019     Cardiomyopathy, unspecified type (H) 2019     CKD (chronic kidney disease) stage 4, GFR 15-29 ml/min (H) 2019     Hyperlipidemia LDL goal <70 2019     Tobacco abuse      Past Surgical History:   Procedure Laterality Date     LASER KTP GREEN LIGHT PHOTOSELECTIVE VAPORIZATION PROSTATE N/A 2019    Procedure: GREEN LIGHT LASER VAPORIZATION OF THE PROSTATE;  Surgeon: Lencho Germain MD;  Location: [facility] OR     Current Outpatient Medications   Medication Sig Dispense Refill     acetaminophen (TYLENOL) 325 MG tablet Take 2 tablets (650 mg) by mouth every 4 hours as needed for other (mild pain) 100 tablet 0     carvedilol (COREG) 12.5 MG tablet Take 1 tablet (12.5 mg) by mouth 2 times daily (with meals) 180 tablet 3     Ferrous Gluconate 240 (27 Fe) MG TABS Take 1 tablet by mouth daily        hydrALAZINE (APRESOLINE) 100 MG tablet Take 1 tablet (100 mg) by mouth 3 times daily 270 tablet 1     isosorbide mononitrate (IMDUR) 30 MG 24 hr tablet Take 1 tablet (30 mg) by mouth daily 90 tablet 3     magnesium oxide (MAG-OX) 400 MG tablet Take 1 tablet (400 mg) by mouth 2 times daily 60 tablet 0     OTC products: no recent use of OTC ASA, NSAIDS or Steroids    No Known Allergies   Latex Allergy: NO    Social History     Tobacco Use     Smoking status: Former Smoker     Packs/day: 0.50     Years: 30.00     Pack years: 15.00     Types: Cigarettes     Last attempt to quit: 2019     Years since quittin.5     Smokeless tobacco: Never Used     Tobacco comment: 12 cigarettes per day   Substance Use Topics     Alcohol use: Not Currently     Alcohol/week: 0.0 standard drinks     Comment: quit in 2018.      History   Drug Use Unknown       REVIEW  OF SYSTEMS:   CONSTITUTIONAL: NEGATIVE for fever, chills, change in weight  INTEGUMENTARY/SKIN: NEGATIVE for worrisome rashes, moles or lesions  ENT/MOUTH: NEGATIVE for ear, mouth and throat problems  RESP: NEGATIVE for significant cough or SOB  CV: NEGATIVE for chest pain, palpitations or peripheral edema  GI: NEGATIVE for nausea, abdominal pain, heartburn, or change in bowel habits  MUSCULOSKELETAL: NEGATIVE for significant arthralgias or myalgia  NEURO: NEGATIVE for weakness, dizziness or paresthesias  ENDOCRINE: NEGATIVE for temperature intolerance, skin/hair changes  HEME/ALLERGY/IMMUNE: NEGATIVE for bleeding problems  PSYCHIATRIC: NEGATIVE for changes in mood or affect  ROS otherwise negative    EXAM:   /74 (BP Location: Left arm, Patient Position: Chair, Cuff Size: Adult Large)   Pulse 62   Temp 97.6  F (36.4  C) (Oral)   Resp 18   Wt 106.1 kg (234 lb)   SpO2 99%   BMI 32.18 kg/m    GENERAL APPEARANCE: healthy, alert and no distress  HENT: ear canals and TM's normal and nose and mouth without ulcers or lesions  RESP: lungs clear to auscultation - no rales, rhonchi or wheezes  CV: regular rate and rhythm, normal S1 S2, no S3 or S4 and no murmur, click or rub   ABDOMEN: soft, nontender, no HSM or masses and bowel sounds normal  MS: extremities normal- no gross deformities noted  SKIN: no suspicious lesions or rashes  NEURO: Normal strength and tone, sensory exam grossly normal, mentation intact and speech normal  PSYCH: mentation appears normal and affect normal/bright    DIAGNOSTICS:   EKG: Not indicated due to non-vascular surgery and last ekg on 10/31/2019 (  Component Name Value Ref Range   Leukocytes, Blood 11.2 (H) 3.8 - 10.8 Thousand/uL   Erythrocytes (RBC) 3.85 (L) 4.2 - 5.8 Million/uL   Hemoglobin, Blood 12.3 (L) 13.2 - 17.1 g/dL   Hematocrit of Blood 35.7 (L) 38.5 - 50 %   MCV 92.7 80 - 100 fL   MCH 31.9 27 - 33 pg   MCHC 34.5 32 - 36 g/dL   Erythrocyte distribution width 12.9 11 - 15 %    Platelets, Blood 349 140 - 400 Thousand/uL   Platelet mean volume, Blood 9.4 7.5 - 12.5 fL   Neutrophils, Blood 9,117 (H) 1,500 - 7,800 cells/uL           Glucose, Serum/Plasma 97   Comment:               Fasting reference interval 65 - 99 mg/dL   Urea nitrogen, Serum/Plasma (BUN) 50 (H) 7 - 25 mg/dL   Creatinine, Serum/Plasma 2.77 (H)   Comment:  For patients >49 years of age, the reference limit  for Creatinine is approximately 13% higher for people  identified as -American. 0.7 - 1.25 mg/dL   eGFR, non  23 (L) > OR = 60 mL/min/1.73m2   eGFR, African American 27 (L) > OR = 60 mL/min/1.73m2   Urea nitrogen/Creatinine, Serum/Plasma 18 6 - 22 (calc)   Sodium, Serum/Plasma 137 135 - 146 mmol/L   Potassium, Serum/Plasma 5.5 (H) 3.5 - 5.3 mmol/L   Chloride, Serum/Plasma 110 98 - 110 mmol/L   Carbon dioxide, total, Serum/Plasma 20 20 - 32 mmol/L   Calcium, Serum/Plasma 9.2 8.6 - 10.3 mg/dL   Phosphate, Serum/Plasma 2.8 2.5 - 4.5 mg/dL   Albumin, Serum/Plasma 3.8 3.6 - 5.1 g/dL   Other Result Information   Performing Organization Information:      Site ID: [id number]      [facility]      Address: [address] [place] [id number]      Director: Juanito Clay M.D.         Recent Labs   Lab Test 11/22/19  0642 11/21/19  0750 10/31/19  1719  06/22/19  0730   HGB 12.1*  --  12.3*   < > 9.0*   PLT  --   --  282  --  225    142 138   < > 142   POTASSIUM 4.8 4.8 5.2   < > 4.4   CR 3.11* 3.38* 3.04*   < > 7.50*    < > = values in this interval not displayed.        IMPRESSION:   Reason for surgery/procedure: left inguinal hernia   Diagnosis/reason for consult: cardiomyopathy, HTN, hyperlipidemia, CKD, Anemia     The proposed surgical procedure is considered INTERMEDIATE risk.    REVISED CARDIAC RISK INDEX  The patient has the following serious cardiovascular risks for perioperative complications such as (MI, PE, VFib and 3  AV Block):  No serious cardiac  risks  INTERPRETATION: 0 risks: Class I (very low risk - 0.4% complication rate)    The patient has the following additional risks for perioperative complications:  No identified additional risks      ICD-10-CM    1. Preop general physical exam Z01.818    2. Left inguinal hernia K40.90    3. Cardiomyopathy, unspecified type (H) I42.9    4. Benign essential hypertension I10    5. Hyperlipidemia LDL goal <70 E78.5    6. CKD (chronic kidney disease) stage 4, GFR 15-29 ml/min (H) N18.4    7. Anemia, unspecified type D64.9        RECOMMENDATIONS:       Cardiovascular Risk  Patient is already on a Beta Blocker. Continue Betablocker therapy after surgery, using Beta blocker order set as necessary for NPO status.      Anemia  Anemia and does not require treatment prior to surgery.  Monitor Hemoglobin postoperatively.      --Patient is to take all scheduled medications on the day of surgery    APPROVAL GIVEN to proceed with proposed procedure, without further diagnostic evaluation       Signed Electronically by: Lluvia Lynch PA-C    Copy of this evaluation report is provided to requesting physician.    [facility] Preop Guidelines    Revised Cardiac Risk Index

## 2019-12-18 ENCOUNTER — ANESTHESIA EVENT (OUTPATIENT)
Dept: SURGERY | Facility: CLINIC | Age: 64
End: 2019-12-18
Payer: COMMERCIAL

## 2019-12-19 ENCOUNTER — ANESTHESIA (OUTPATIENT)
Dept: SURGERY | Facility: CLINIC | Age: 64
End: 2019-12-19
Payer: COMMERCIAL

## 2019-12-19 ENCOUNTER — APPOINTMENT (OUTPATIENT)
Dept: SURGERY | Facility: PHYSICIAN GROUP | Age: 64
End: 2019-12-19
Payer: COMMERCIAL

## 2019-12-19 ENCOUNTER — HOSPITAL ENCOUNTER (OUTPATIENT)
Facility: CLINIC | Age: 64
Discharge: HOME | End: 2019-12-19
Attending: SURGERY | Admitting: SURGERY
Payer: COMMERCIAL

## 2019-12-19 VITALS
BODY MASS INDEX: 32.19 KG/M2 | TEMPERATURE: 97.8 F | SYSTOLIC BLOOD PRESSURE: 161 MMHG | HEIGHT: 71 IN | HEART RATE: 60 BPM | RESPIRATION RATE: 16 BRPM | DIASTOLIC BLOOD PRESSURE: 94 MMHG | OXYGEN SATURATION: 100 %

## 2019-12-19 DIAGNOSIS — K40.90 LEFT INGUINAL HERNIA: ICD-10-CM

## 2019-12-19 PROCEDURE — 25000125 ZZHC RX 250: Performed by: SURGERY

## 2019-12-19 PROCEDURE — 25000128 H RX IP 250 OP 636: Performed by: SURGERY

## 2019-12-19 PROCEDURE — 25800030 ZZH RX IP 258 OP 636: Performed by: ANESTHESIOLOGY

## 2019-12-19 PROCEDURE — 71000027 ZZH RECOVERY PHASE 2 EACH 15 MINS: Performed by: SURGERY

## 2019-12-19 PROCEDURE — 27210794 ZZH OR GENERAL SUPPLY STERILE: Performed by: SURGERY

## 2019-12-19 PROCEDURE — 25800030 ZZH RX IP 258 OP 636: Performed by: NURSE ANESTHETIST, CERTIFIED REGISTERED

## 2019-12-19 PROCEDURE — 36000052 ZZH SURGERY LEVEL 2 EA 15 ADDTL MIN: Performed by: SURGERY

## 2019-12-19 PROCEDURE — 36000050 ZZH SURGERY LEVEL 2 1ST 30 MIN: Performed by: SURGERY

## 2019-12-19 PROCEDURE — 25000132 ZZH RX MED GY IP 250 OP 250 PS 637: Performed by: ANESTHESIOLOGY

## 2019-12-19 PROCEDURE — 25000125 ZZHC RX 250: Performed by: NURSE ANESTHETIST, CERTIFIED REGISTERED

## 2019-12-19 PROCEDURE — C1781 MESH (IMPLANTABLE): HCPCS | Performed by: SURGERY

## 2019-12-19 PROCEDURE — 49505 PRP I/HERN INIT REDUC >5 YR: CPT | Mod: LT | Performed by: SURGERY

## 2019-12-19 PROCEDURE — 40000306 ZZH STATISTIC PRE PROC ASSESS II: Performed by: SURGERY

## 2019-12-19 PROCEDURE — 25000128 H RX IP 250 OP 636: Performed by: NURSE ANESTHETIST, CERTIFIED REGISTERED

## 2019-12-19 PROCEDURE — 37000008 ZZH ANESTHESIA TECHNICAL FEE, 1ST 30 MIN: Performed by: SURGERY

## 2019-12-19 PROCEDURE — 25000128 H RX IP 250 OP 636: Performed by: ANESTHESIOLOGY

## 2019-12-19 PROCEDURE — 71000015 ZZH RECOVERY PHASE 1 LEVEL 2 EA ADDTL HR: Performed by: SURGERY

## 2019-12-19 PROCEDURE — 49505 PRP I/HERN INIT REDUC >5 YR: CPT | Mod: AS | Performed by: PHYSICIAN ASSISTANT

## 2019-12-19 PROCEDURE — 37000009 ZZH ANESTHESIA TECHNICAL FEE, EACH ADDTL 15 MIN: Performed by: SURGERY

## 2019-12-19 PROCEDURE — 71000014 ZZH RECOVERY PHASE 1 LEVEL 2 FIRST HR: Performed by: SURGERY

## 2019-12-19 DEVICE — MESH ULTRAPRO HERNIA 10CM UHSL6: Type: IMPLANTABLE DEVICE | Site: INGUINAL | Status: FUNCTIONAL

## 2019-12-19 RX ORDER — FENTANYL CITRATE 50 UG/ML
INJECTION, SOLUTION INTRAMUSCULAR; INTRAVENOUS PRN
Status: DISCONTINUED | OUTPATIENT
Start: 2019-12-19 | End: 2019-12-19

## 2019-12-19 RX ORDER — HYDROCODONE BITARTRATE AND ACETAMINOPHEN 5; 325 MG/1; MG/1
1 TABLET ORAL
Status: DISCONTINUED | OUTPATIENT
Start: 2019-12-19 | End: 2019-12-19 | Stop reason: HOSPADM

## 2019-12-19 RX ORDER — TAMSULOSIN HYDROCHLORIDE 0.4 MG/1
0.4 CAPSULE ORAL
Status: DISCONTINUED | OUTPATIENT
Start: 2019-12-19 | End: 2019-12-19 | Stop reason: HOSPADM

## 2019-12-19 RX ORDER — FENTANYL CITRATE 50 UG/ML
25-50 INJECTION, SOLUTION INTRAMUSCULAR; INTRAVENOUS
Status: DISCONTINUED | OUTPATIENT
Start: 2019-12-19 | End: 2019-12-19 | Stop reason: HOSPADM

## 2019-12-19 RX ORDER — CEFAZOLIN SODIUM 2 G/100ML
2 INJECTION, SOLUTION INTRAVENOUS
Status: COMPLETED | OUTPATIENT
Start: 2019-12-19 | End: 2019-12-19

## 2019-12-19 RX ORDER — BUPIVACAINE HYDROCHLORIDE 5 MG/ML
INJECTION, SOLUTION EPIDURAL; INTRACAUDAL PRN
Status: DISCONTINUED | OUTPATIENT
Start: 2019-12-19 | End: 2019-12-19 | Stop reason: HOSPADM

## 2019-12-19 RX ORDER — ONDANSETRON 2 MG/ML
INJECTION INTRAMUSCULAR; INTRAVENOUS PRN
Status: DISCONTINUED | OUTPATIENT
Start: 2019-12-19 | End: 2019-12-19

## 2019-12-19 RX ORDER — HYDROMORPHONE HYDROCHLORIDE 1 MG/ML
.3-.5 INJECTION, SOLUTION INTRAMUSCULAR; INTRAVENOUS; SUBCUTANEOUS EVERY 10 MIN PRN
Status: DISCONTINUED | OUTPATIENT
Start: 2019-12-19 | End: 2019-12-19 | Stop reason: HOSPADM

## 2019-12-19 RX ORDER — NALOXONE HYDROCHLORIDE 0.4 MG/ML
.1-.4 INJECTION, SOLUTION INTRAMUSCULAR; INTRAVENOUS; SUBCUTANEOUS
Status: DISCONTINUED | OUTPATIENT
Start: 2019-12-19 | End: 2019-12-19 | Stop reason: HOSPADM

## 2019-12-19 RX ORDER — CEFAZOLIN SODIUM 1 G/3ML
1 INJECTION, POWDER, FOR SOLUTION INTRAMUSCULAR; INTRAVENOUS SEE ADMIN INSTRUCTIONS
Status: DISCONTINUED | OUTPATIENT
Start: 2019-12-19 | End: 2019-12-19 | Stop reason: HOSPADM

## 2019-12-19 RX ORDER — SODIUM CHLORIDE, SODIUM LACTATE, POTASSIUM CHLORIDE, CALCIUM CHLORIDE 600; 310; 30; 20 MG/100ML; MG/100ML; MG/100ML; MG/100ML
INJECTION, SOLUTION INTRAVENOUS CONTINUOUS
Status: DISCONTINUED | OUTPATIENT
Start: 2019-12-19 | End: 2019-12-19 | Stop reason: HOSPADM

## 2019-12-19 RX ORDER — GLYCINE 1.5 G/100ML
SOLUTION IRRIGATION PRN
Status: DISCONTINUED | OUTPATIENT
Start: 2019-12-19 | End: 2019-12-19 | Stop reason: HOSPADM

## 2019-12-19 RX ORDER — ALBUTEROL SULFATE 0.83 MG/ML
2.5 SOLUTION RESPIRATORY (INHALATION) EVERY 4 HOURS PRN
Status: DISCONTINUED | OUTPATIENT
Start: 2019-12-19 | End: 2019-12-19 | Stop reason: HOSPADM

## 2019-12-19 RX ORDER — PROPOFOL 10 MG/ML
INJECTION, EMULSION INTRAVENOUS PRN
Status: DISCONTINUED | OUTPATIENT
Start: 2019-12-19 | End: 2019-12-19

## 2019-12-19 RX ORDER — HYDROCODONE BITARTRATE AND ACETAMINOPHEN 5; 325 MG/1; MG/1
1-2 TABLET ORAL EVERY 4 HOURS PRN
Qty: 10 TABLET | Refills: 0 | Status: SHIPPED | OUTPATIENT
Start: 2019-12-19 | End: 2020-07-13

## 2019-12-19 RX ORDER — ONDANSETRON 2 MG/ML
4 INJECTION INTRAMUSCULAR; INTRAVENOUS EVERY 30 MIN PRN
Status: DISCONTINUED | OUTPATIENT
Start: 2019-12-19 | End: 2019-12-19 | Stop reason: HOSPADM

## 2019-12-19 RX ORDER — EPHEDRINE SULFATE 50 MG/ML
INJECTION, SOLUTION INTRAMUSCULAR; INTRAVENOUS; SUBCUTANEOUS PRN
Status: DISCONTINUED | OUTPATIENT
Start: 2019-12-19 | End: 2019-12-19

## 2019-12-19 RX ORDER — ACETAMINOPHEN 325 MG/1
975 TABLET ORAL ONCE
Status: COMPLETED | OUTPATIENT
Start: 2019-12-19 | End: 2019-12-19

## 2019-12-19 RX ORDER — MAGNESIUM HYDROXIDE 1200 MG/15ML
LIQUID ORAL PRN
Status: DISCONTINUED | OUTPATIENT
Start: 2019-12-19 | End: 2019-12-19 | Stop reason: HOSPADM

## 2019-12-19 RX ORDER — FENTANYL CITRATE 50 UG/ML
25-50 INJECTION, SOLUTION INTRAMUSCULAR; INTRAVENOUS EVERY 5 MIN PRN
Status: DISCONTINUED | OUTPATIENT
Start: 2019-12-19 | End: 2019-12-19 | Stop reason: HOSPADM

## 2019-12-19 RX ORDER — ONDANSETRON 4 MG/1
4 TABLET, ORALLY DISINTEGRATING ORAL EVERY 30 MIN PRN
Status: DISCONTINUED | OUTPATIENT
Start: 2019-12-19 | End: 2019-12-19 | Stop reason: HOSPADM

## 2019-12-19 RX ORDER — MEPERIDINE HYDROCHLORIDE 50 MG/ML
12.5 INJECTION INTRAMUSCULAR; INTRAVENOUS; SUBCUTANEOUS
Status: DISCONTINUED | OUTPATIENT
Start: 2019-12-19 | End: 2019-12-19 | Stop reason: HOSPADM

## 2019-12-19 RX ORDER — GABAPENTIN 100 MG/1
100 CAPSULE ORAL ONCE
Status: COMPLETED | OUTPATIENT
Start: 2019-12-19 | End: 2019-12-19

## 2019-12-19 RX ORDER — LIDOCAINE 40 MG/G
CREAM TOPICAL
Status: DISCONTINUED | OUTPATIENT
Start: 2019-12-19 | End: 2019-12-19 | Stop reason: HOSPADM

## 2019-12-19 RX ORDER — DEXAMETHASONE SODIUM PHOSPHATE 4 MG/ML
INJECTION, SOLUTION INTRA-ARTICULAR; INTRALESIONAL; INTRAMUSCULAR; INTRAVENOUS; SOFT TISSUE PRN
Status: DISCONTINUED | OUTPATIENT
Start: 2019-12-19 | End: 2019-12-19

## 2019-12-19 RX ORDER — LIDOCAINE HYDROCHLORIDE 10 MG/ML
INJECTION, SOLUTION INFILTRATION; PERINEURAL PRN
Status: DISCONTINUED | OUTPATIENT
Start: 2019-12-19 | End: 2019-12-19

## 2019-12-19 RX ADMIN — GABAPENTIN 100 MG: 100 CAPSULE ORAL at 11:49

## 2019-12-19 RX ADMIN — FENTANYL CITRATE 50 MCG: 50 INJECTION, SOLUTION INTRAMUSCULAR; INTRAVENOUS at 14:10

## 2019-12-19 RX ADMIN — PHENYLEPHRINE HYDROCHLORIDE 100 MCG: 10 INJECTION INTRAVENOUS at 13:03

## 2019-12-19 RX ADMIN — ONDANSETRON HYDROCHLORIDE 4 MG: 2 INJECTION, SOLUTION INTRAVENOUS at 12:54

## 2019-12-19 RX ADMIN — PHENYLEPHRINE HYDROCHLORIDE 50 MCG: 10 INJECTION INTRAVENOUS at 13:07

## 2019-12-19 RX ADMIN — PHENYLEPHRINE HYDROCHLORIDE 200 MCG: 10 INJECTION INTRAVENOUS at 12:43

## 2019-12-19 RX ADMIN — PHENYLEPHRINE HYDROCHLORIDE 100 MCG: 10 INJECTION INTRAVENOUS at 12:46

## 2019-12-19 RX ADMIN — FENTANYL CITRATE 100 MCG: 50 INJECTION, SOLUTION INTRAMUSCULAR; INTRAVENOUS at 12:43

## 2019-12-19 RX ADMIN — SODIUM CHLORIDE, POTASSIUM CHLORIDE, SODIUM LACTATE AND CALCIUM CHLORIDE: 600; 310; 30; 20 INJECTION, SOLUTION INTRAVENOUS at 13:37

## 2019-12-19 RX ADMIN — ACETAMINOPHEN 975 MG: 325 TABLET, FILM COATED ORAL at 11:49

## 2019-12-19 RX ADMIN — PHENYLEPHRINE HYDROCHLORIDE 100 MCG: 10 INJECTION INTRAVENOUS at 12:53

## 2019-12-19 RX ADMIN — PROPOFOL 170 MG: 10 INJECTION, EMULSION INTRAVENOUS at 12:43

## 2019-12-19 RX ADMIN — MIDAZOLAM 2 MG: 1 INJECTION INTRAMUSCULAR; INTRAVENOUS at 12:36

## 2019-12-19 RX ADMIN — SODIUM CHLORIDE, POTASSIUM CHLORIDE, SODIUM LACTATE AND CALCIUM CHLORIDE: 600; 310; 30; 20 INJECTION, SOLUTION INTRAVENOUS at 12:36

## 2019-12-19 RX ADMIN — Medication 5 MG: at 13:24

## 2019-12-19 RX ADMIN — LIDOCAINE HYDROCHLORIDE 50 MG: 10 INJECTION, SOLUTION INFILTRATION; PERINEURAL at 12:43

## 2019-12-19 RX ADMIN — DEXAMETHASONE SODIUM PHOSPHATE 4 MG: 4 INJECTION, SOLUTION INTRA-ARTICULAR; INTRALESIONAL; INTRAMUSCULAR; INTRAVENOUS; SOFT TISSUE at 12:43

## 2019-12-19 RX ADMIN — CEFAZOLIN SODIUM 2 G: 2 INJECTION, SOLUTION INTRAVENOUS at 12:36

## 2019-12-19 ASSESSMENT — LIFESTYLE VARIABLES: TOBACCO_USE: 1

## 2019-12-19 NOTE — BRIEF OP NOTE
[facility]    Brief Operative Note    Pre-operative diagnosis: Left inguinal hernia [K40.90]  Post-operative diagnosis Left inguinal hernia    Procedure: Procedure(s):  OPEN REPAIR LEFT INGUINAL HERNIA WITH MESH  Surgeon: Surgeon(s) and Role:     * Rao Schmidt MD - Primary     * Ni Jimenez PA-C - Assisting  Anesthesia: General   Estimated blood loss: Less than 10 ml  Drains: None  Specimens: * No specimens in log *  Findings:   Indirect defect containing fat and sac, repaired with UltraPro hernia system..  Complications: None.  Implants:   Implant Name Type Inv. Item Serial No.  Lot No. LRB No. Used   MESH ULTRAPRO HERNIA 10CM [id number] Mesh MESH ULTRAPRO HERNIA 10CM [id number]  J [id number] Left 1

## 2019-12-19 NOTE — DISCHARGE INSTRUCTIONS
HOME CARE FOLLOWING INGUINAL/FEMORAL HERNIA REPAIR  NOHELIA Carlson, RASHI Carrasco R. O Donnell, JEFFRY Taylor    DIET:  No restrictions.  Increased fluid intake is recommended. While taking pain medications, increase dietary fiber or add a fiber supplementation like Metamucil or Citrucel to help prevent constipation - a possible side effect of pain medications.    NAUSEA:  If nauseated from the anesthetic/pain meds; rest in bed, get up cautiously with assistance, and drink clear liquids (juice, tea, broth).    ACTIVITY:  Light Activity -- you may immediately be up and about as tolerated.      Driving -- you may drive when comfortable and off narcotic pain medications.  Light Work -- resume when comfortable off pain medications.  (If you can drive, you probably can work.)  Strenuous Work/Activity -- limit lifting to 20 pounds for 3 weeks.  Active Sports (running, biking, etc.) -- cautiously resume after 4 weeks.    INCISIONAL CARE:    If you have a dressing in place, keep clean and dry for 48 hours; you may replace the gauze if it becomes soiled.    After 48 hours you may remove the dressing and shower.  Do not submerse incision in water for 1 week.    If you have a Dermabond dressing (a type of skin glue), you may shower immediately.    Sutures will absorb and need not be removed.    If present, leave the steri-strips (white paper tapes) in place for 14 days after surgery.    If present, leave Dermabond glue in place until it wears/flakes off.    Expect a variable amount of swelling/black and blue discoloration that may involve the penis/scrotum or labia.    Some numbness around the incision is common.    A lump/ridge under the incision is normal and will gradually resolve.    DISCOMFORT:  Local anesthetic placed at surgery should provide relief for 4-8 hours.  Begin taking pain pills before discomfort is severe.  Take the pain medication with some food, when possible, to minimize side effects.   Intermittent use of ice packs to the hernia repair site may help during the first 1-3 weeks after surgery.  Expect gradual improvement.    Over-the-counter anti-inflammatory medications (i.e. Ibuprofen/Advil/Motrin or Naprosyn/Aleve) may be used per package instructions in addition to or while tapering off the narcotic pain medications to decrease swelling and sensitivity at the repair site.  DO NOT TAKE these Anti-inflammatory medications if your primary physician has advised against doing so, or if you have acid reflux, ulcer, or bleeding disorder, or take blood-thinner medications.  Call your primary physician or the surgery office if you have medication questions.    RETURN APPOINTMENT:  Schedule a follow-up visit 2-3 weeks post-op.  Office Phone:  [phone]     CONTACT US IF THE FOLLOWING DEVELOPS:   1. A fever that is above 101     2. If there is a large amount of drainage, bleeding, or swelling.   3. Severe pain that is not relieved by your prescription.   4. Drainage that is thick, cloudy, yellow, green or white.   5. Any other questions not answered by  Frequently Asked Questions  sheet.      FREQUENTLY ASKED QUESTIONS:    Q:  How should my incision look?    A:  Normally your incision will appear slightly swollen with light redness directly along the incision itself as it heals.  It may feel like a bump or ridge as the healing/scarring happens, and over time (3-4 months) this bump or ridge feeling should slowly go away.  In general, clear or pink watery drainage can be normal at first as your incision heals, but should decrease over time.    Q:  How do I know if my incision is infected?  A:  Look at your incision for signs of infection, like redness around the incision spreading to surrounding skin, or drainage of cloudy or foul-smelling drainage.  If you feel warm, check your temperature to see if you are running a fever.    **If any of these things occur, please notify the nurse at our office.  We may  need you to come into the office for an incision check.      Q:  How do I take care of my incision?  A:  If you have a dressing in place - Starting the day after surgery, replace the dressing 1-2 times a day until there is no further drainage from the incision.  At that time, a dressing is no longer needed.  Try to minimize tape on the skin if irritation is occurring at the tape sites.  If you have significant irritation from tape on the skin, please call the office to discuss other method of dressing your incision.    Small pieces of tape called  steri-strips  may be present directly overlying your incision; these may be removed 10 days after surgery unless otherwise specified by your surgeon.  If these tapes start to loosen at the ends, you may trim them back until they fall off or are removed.    A:  If you had  Dermabond  tissue glue used as a dressing (this causes your incision to look shiny with a clear covering over it) - This type of dressing wears off with time and does not require more dressings over the top unless it is draining around the glue as it wears off.  Do not apply ointments or lotions over the incisions until the glue has completely worn off.    Q:  There is a piece of tape or a sticky  lead  still on my skin.  Can I remove this?  A:  Sometimes the sticky  leads  used for monitoring during surgery or for evaluation in the emergency department are not all removed while you are in the hospital.  These sometimes have a tab or metal dot on them.  You can easily remove these on your own, like taking off a band-aid.  If there is a gel substance under the  lead , simply wipe/clean it off with a washcloth or paper towel.      Q:  What can I do to minimize constipation (very hard stools, or lack of stools)?  A:  Stay well hydrated.  Increase your dietary fiber intake or take a fiber supplement -with plenty of water.  Walk around frequently.  You may consider an over-the-counter stool-softener.  Your  Pharmacist can assist you with choosing one that is stocked at your pharmacy.  Constipation is also one of the most common side effects of pain medication.  If you are using pain medication, be pro-active and try to PREVENT problems with constipation by taking the steps above BEFORE constipation becomes a problem.    Q:  What do I do if I need more pain medications?  A:  Call the office to receive refills.  Be aware that certain pain meds cannot be called into a pharmacy and actually require a paper prescription.  A change may be made in your pain med as you progress thru your recovery period or if you have side effects to certain meds.    --Pain meds are NOT refilled after 5pm on weekdays, and NOT AT ALL on the weekends, so please look ahead to prevent problems.      Q:  Why am I having a hard time sleeping now that I am at home?  A:  Many medications you receive while you are in the hospital can impact your sleep for a number of days after your surgery/hospitalization.  Decreased level of activity and naps during the day may also make sleeping at night difficult.  Try to minimize day-time naps, and get up frequently during the day to walk around your home during your recovery time.  Sleep aides may be of some help, but are not recommended for long-term use.      Q:  I am having some back discomfort.  What should I do?  A:  This may be related to certain positioning that was required for your surgery, extended periods of time in bed, or other changes in your overall activity level.  You may try ice, heat, acetaminophen, or ibuprofen to treat this temporarily.  Note that many pain medications have acetaminophen in them and would state this on the prescription bottle.  Be sure not to exceed the maximum of 4000mg per day of acetaminophen.     **If the pain you are having does not resolve, is severe, or is a flare of back pain you have had on other occasions prior to surgery, please contact your primary physician for  further recommendations or for an appointment to be examined at their office.    Q:  Why am I having headaches?  A:  Headaches can be caused by many things:  caffeine withdrawal, use of pain meds, dehydration, high blood pressure, lack of sleep, over-activity/exhaustion, flare-up of usual migraine headaches.  If you feel this is related to muscle tension (a band-like feeling around the head, or a pressure at the low-back of the head) you may try ice or heat to this area.  You may need to drink more fluids (try electrolyte drink like Gatorade), rest, or take your usual migraine medications.   **If your headaches do not resolve, worsen, are accompanied by other symptoms, or if your blood pressure is high, please call your primary physician for recommendation and/or examination.    Q:  I am unable to urinate.  What do I do?  A:  A small percentage of people can have difficulty urinating initially after surgery.  This includes being able to urinate only a very small amount at a time and feeling discomfort or pressure in the very low abdomen.  This is called  urinary retention , and is actually an urgent situation.  Proceed to your nearest Emergency department for evaluation (not an Urgent Care Center).  Sometimes the bladder does not work correctly after certain medications you receive during surgery, or related to certain procedures.  You may need to have a catheter placed until your bladder recovers.  When planning to go to an Emergency department, it may help to call the ER to let them know you are coming in for this problem after a surgery.  This may help you get in quicker to be evaluated.  **If you have symptoms of a urinary tract infection, please contact your primary physician for the proper evaluation and treatment.    If you have other questions, please call the office Monday thru Friday between 8am and 5pm to discuss with the nurse or physician assistant.  #[phone]    There is a surgeon ON CALL on  weekday evenings and over the weekend in case of urgent need only, and may be contacted at the same number.    If you are having an emergency, call 911 or proceed to your nearest emergency department.    GENERAL ANESTHESIA OR SEDATION ADULT DISCHARGE INSTRUCTIONS   SPECIAL PRECAUTIONS FOR 24 HOURS AFTER SURGERY    IT IS NOT UNUSUAL TO FEEL LIGHT-HEADED OR FAINT, UP TO 24 HOURS AFTER SURGERY OR WHILE TAKING PAIN MEDICATION.  IF YOU HAVE THESE SYMPTOMS; SIT FOR A FEW MINUTES BEFORE STANDING AND HAVE SOMEONE ASSIST YOU WHEN YOU GET UP TO WALK OR USE THE BATHROOM.    YOU SHOULD REST AND RELAX FOR THE NEXT 24 HOURS AND YOU MUST MAKE ARRANGEMENTS TO HAVE SOMEONE STAY WITH YOU FOR AT LEAST 24 HOURS AFTER YOUR DISCHARGE.  AVOID HAZARDOUS AND STRENUOUS ACTIVITIES.  DO NOT MAKE IMPORTANT DECISIONS FOR 24 HOURS.    DO NOT DRIVE ANY VEHICLE OR OPERATE MECHANICAL EQUIPMENT FOR 24 HOURS FOLLOWING THE END OF YOUR SURGERY.  EVEN THOUGH YOU MAY FEEL NORMAL, YOUR REACTIONS MAY BE AFFECTED BY THE MEDICATION YOU HAVE RECEIVED.    DO NOT DRINK ALCOHOLIC BEVERAGES FOR 24 HOURS FOLLOWING YOUR SURGERY.    DRINK CLEAR LIQUIDS (APPLE JUICE, GINGER ALE, 7-UP, BROTH, ETC.).  PROGRESS TO YOUR REGULAR DIET AS YOU FEEL ABLE.    YOU MAY HAVE A DRY MOUTH, A SORE THROAT, MUSCLES ACHES OR TROUBLE SLEEPING.  THESE SHOULD GO AWAY AFTER 24 HOURS.    CALL YOUR DOCTOR FOR ANY OF THE FOLLOWING:  SIGNS OF INFECTION (FEVER, GROWING TENDERNESS AT THE SURGERY SITE, A LARGE AMOUNT OF DRAINAGE OR BLEEDING, SEVERE PAIN, FOUL-SMELLING DRAINAGE, REDNESS OR SWELLING.    IT HAS BEEN OVER 8 TO 10 HOURS SINCE SURGERY AND YOU ARE STILL NOT ABLE TO URINATE (PASS WATER).

## 2019-12-19 NOTE — ANESTHESIA PREPROCEDURE EVALUATION
Anesthesia Pre-Procedure Evaluation    Patient: Ras Esparza   MRN: [id number] : 1955          Preoperative Diagnosis: Left inguinal hernia [K40.90]    Procedure(s):  OPEN REPAIR LEFT INGUINAL HERNIA WITH MESH    Past Medical History:   Diagnosis Date     Anemia, unspecified type 2019     Benign essential hypertension 2019     Bladder outflow obstruction 2019     Cardiomyopathy, unspecified type (H) 2019     CKD (chronic kidney disease) stage 4, GFR 15-29 ml/min (H) 2019     Hyperlipidemia LDL goal <70 2019     Tobacco abuse      Past Surgical History:   Procedure Laterality Date     LASER KTP GREEN LIGHT PHOTOSELECTIVE VAPORIZATION PROSTATE N/A 2019    Procedure: GREEN LIGHT LASER VAPORIZATION OF THE PROSTATE;  Surgeon: Lencho Germain MD;  Location:  OR     Anesthesia Evaluation     .             ROS/MED HX    ENT/Pulmonary:     (+)tobacco use, , . .    Neurologic:  - neg neurologic ROS    (-) Neuropathy   Cardiovascular:     (+) Dyslipidemia, hypertension--CAD, --. : . CHF Last EF: 40-45 . . :. . Previous cardiac testing Echodate:10/2019results:Left ventricular systolic function is mildly reduced.  LVEF 46% based on biplane 2D tracing. Mild global hypokinesia of the left  ventricle.  The right ventricle is normal in structure, function and size.  No significant valvular abnormalities.  Mild aortic root dilatation. Max diameter of the visualized portion 4.2 cm.Stress Testdate:10/2019 results:1. Myocardial perfusion imaging using single isotope technique  demonstrated partially reversible inferior and basal inferolateral  defect.   The small, reversible basal inferolateral defect suggests mild  ischemia in the circumflex distribution.   The fixed inferior defect is likely due to diaphragmatic attenuation.   2. Gated images demonstrated mildly dilated left ventricle with  borderline Global hypokinesis. The left ventricular systolic function  is mildly reduced at 40%  "post stress. date: results: date: results:         (-) angina, past MI, taking anticoagulants/antiplatelets, irregular heartbeat/palpitations, stent, angina, past MI and CABG   METS/Exercise Tolerance:     Hematologic:     (+) Anemia ( 12.1), -      Musculoskeletal:  - neg musculoskeletal ROS       GI/Hepatic:  - neg GI/hepatic ROS       Renal/Genitourinary:     (+) chronic renal disease, type: CRI, Pt does not require dialysis, Pt has no history of transplant,       Endo:  - neg endo ROS   (+) Obesity ( 34), .      Psychiatric:  - neg psychiatric ROS       Infectious Disease:  - neg infectious disease ROS       Malignancy:      - no malignancy   Other:                          Physical Exam  Normal systems: cardiovascular, pulmonary and dental    Airway   Mallampati: I  TM distance: >3 FB  Neck ROM: full    Dental     Cardiovascular       Pulmonary             Lab Results   Component Value Date    WBC 8.2 10/31/2019    HGB 12.1 (L) 11/22/2019    HCT 37.7 (L) 10/31/2019     10/31/2019     11/22/2019    POTASSIUM 4.8 11/22/2019    CHLORIDE 116 (H) 11/22/2019    CO2 18 (L) 11/22/2019    BUN 52 (H) 11/22/2019    CR 3.11 (H) 11/22/2019     (H) 11/22/2019    ELICIA 8.2 (L) 11/22/2019    PHOS 3.4 10/31/2019    MAG 2.0 06/28/2019    ALBUMIN 3.5 10/31/2019    PROTTOTAL 7.1 06/21/2019    ALT 38 06/21/2019    AST 15 06/21/2019    ALKPHOS 80 06/21/2019    BILITOTAL 0.6 06/21/2019    TSH 0.85 06/20/2019       Preop Vitals  BP Readings from Last 3 Encounters:   12/17/19 112/74   12/13/19 128/88   12/11/19 (!) 162/90    Pulse Readings from Last 3 Encounters:   12/17/19 62   12/13/19 68   12/11/19 71      Resp Readings from Last 3 Encounters:   12/17/19 18   12/13/19 16   12/11/19 16    SpO2 Readings from Last 3 Encounters:   12/17/19 99%   12/13/19 99%   12/11/19 100%      Temp Readings from Last 1 Encounters:   12/17/19 97.6  F (36.4  C) (Oral)    Ht Readings from Last 1 Encounters:   12/13/19 1.816 m (5' 11.5\") " "     Wt Readings from Last 1 Encounters:   12/17/19 106.1 kg (234 lb)    Estimated body mass index is 32.18 kg/m  as calculated from the following:    Height as of 12/13/19: 1.816 m (5' 11.5\").    Weight as of 12/17/19: 106.1 kg (234 lb).       Anesthesia Plan      History & Physical Review  History and physical reviewed and following examination; no interval change.    ASA Status:  3 .    NPO Status:  > 8 hours    Plan for General and LMA with Intravenous induction. Maintenance will be Inhalation.    PONV prophylaxis:  Ondansetron (or other 5HT-3) and Dexamethasone or Solumedrol       Postoperative Care  Postoperative pain management:  IV analgesics, Oral pain medications and Multi-modal analgesia.      Consents  Anesthetic plan, risks, benefits and alternatives discussed with:  Patient..                 Britton Colón MD                    .  "

## 2019-12-19 NOTE — OP NOTE
General Surgery Operative Note      Pre-operative diagnosis: Left inguinal hernia   Post-operative diagnosis: Indirect left inguinal hernia   Procedure: Left Inguinal Herniorrhaphy with Ethicon Ultrapro Hernia System    Surgeon: Rao Schmidt MD   Assistant(s): Ni Jimenez PA-C  The Physician Assistant was medically necessary for their expertise in prepping, suctioning, suturing and retraction.   Anesthesia: General    Estimated blood loss:   5 cc     Specimens: * No specimens in log *       DESCRIPTION OF PROCEDURE:  The patient was placed on the table in supine position. General anesthetic was administered and the abdomen and left groin were prepped and draped in standard sterile fashion. An incision was made over the left inguinal canal. The incision was carried through the subcutaneous tissue to the external oblique aponeurosis. The external oblique was incised with a #15 blade and divided medially to the external ring with Metzenbaum scissors. Each of the leaflets of the external oblique was grasped with a Elsy clamp. The undersurface of the external oblique was swept clear with the surgeon's finger.  We  the hernia and cord structures from the pubic tubercle bluntly and placed a Penrose drain around them. We then commenced with a careful exploration of the cord and hernia.  The cremasters were  bluntly.  The hernia sac and fat were  from the cord structures, down to the level of the internal ring.  The floor of the inguinal canal medial to the epigastric vessels was intact. We commenced with dissection of the preperitoneal space through the internal ring by reducing the hernia sac and dissecting it away from the cord structures. We developed the preperitoneal space down to Kash's ligament medially and posterior to the pubic tubercle. We then developed the space cephalad and lateral to the inguinal canal. The peritoneum was dissected carefully from the cord  structures, iliac vessels and lymphatics. Hemostasis was maintained throughout with the Bovie electrocautery.  The UltraPro Hernia System was soaked in Irricept and rinsed with sterile saline.  The inner leaflet was introduced through the internal ring into the preperitoneal space. The mesh was adjusted such that it lay smoothly. The external leaflet was approximated to Kash's ligament and the shelving edge of the inguinal ligament with a running 2-0 PDS suture.  A slit was made for passage of the spermatic cord.  Interrupted 2-0 PDS suture was used to secure the mesh to the conjoined tendon medially, caphalad and laterally. Local anesthetic was injected at each of the suture sites, as well as in the subcutaneous tissue and skin.  The Doppler was used to confirm arterial and venous flow in the spermatic cord. We confirmed hemostasis and irrigated with Irricept followed by sterile saline. The external oblique aponeurosis was closed with a running 2-0 Vicryl suture. The subcutaneous tissue was closed with interrupted 3-0 Vicryl suture. The skin was closed with a running 4-0 Vicryl subcuticular suture and skin glue. The patient tolerated the procedure well and was transferred to the recovery room in good condition. All sponge and needle counts were correct at the end the case.   Rao Schmidt MD

## 2019-12-19 NOTE — ANESTHESIA POSTPROCEDURE EVALUATION
Patient: Ras Esparza    Procedure(s):  OPEN REPAIR LEFT INGUINAL HERNIA WITH MESH    Diagnosis:Left inguinal hernia [K40.90]  Diagnosis Additional Information: No value filed.    Anesthesia Type:  General, LMA    Note:  Anesthesia Post Evaluation    Patient location during evaluation: PACU  Patient participation: Able to fully participate in evaluation  Level of consciousness: awake and alert  Pain management: adequate  Airway patency: patent  Cardiovascular status: acceptable  Respiratory status: acceptable  Hydration status: acceptable  PONV: controlled             Last vitals:  Vitals:    12/19/19 1350 12/19/19 1355 12/19/19 1400   BP: (!) 143/86 (!) 141/86 (!) 142/86   Pulse: 53 53 56   Resp: 10 8 9   Temp:      SpO2: 100% 100% 100%         Electronically Signed By: Britton Colón MD  December 19, 2019  2:04 PM

## 2019-12-19 NOTE — ANESTHESIA CARE TRANSFER NOTE
Patient: Ras Esparza    Procedure(s):  OPEN REPAIR LEFT INGUINAL HERNIA WITH MESH    Diagnosis: Left inguinal hernia [K40.90]  Diagnosis Additional Information: No value filed.    Anesthesia Type:   General, LMA     Note:  Airway :Face Mask  Patient transferred to:PACU  Comments: To PACU, report to RN, oxygen per face mask.      Vitals: (Last set prior to Anesthesia Care Transfer)    CRNA VITALS  12/19/2019 1307 - 12/19/2019 1351      12/19/2019             Pulse:  64    SpO2:  100 %    Resp Rate (observed):  (!) 2                Electronically Signed By: MAGNOLIA Mckeon CRNA  December 19, 2019  1:51 PM

## 2020-01-16 ENCOUNTER — OFFICE VISIT (OUTPATIENT)
Dept: CARDIOLOGY | Facility: CLINIC | Age: 65
End: 2020-01-16
Attending: NURSE PRACTITIONER
Payer: COMMERCIAL

## 2020-01-16 VITALS
BODY MASS INDEX: 32.43 KG/M2 | HEIGHT: 72 IN | HEART RATE: 60 BPM | SYSTOLIC BLOOD PRESSURE: 136 MMHG | WEIGHT: 239.4 LBS | DIASTOLIC BLOOD PRESSURE: 84 MMHG

## 2020-01-16 DIAGNOSIS — E87.5 HYPERKALEMIA: ICD-10-CM

## 2020-01-16 DIAGNOSIS — N18.4 CKD (CHRONIC KIDNEY DISEASE) STAGE 4, GFR 15-29 ML/MIN (H): ICD-10-CM

## 2020-01-16 DIAGNOSIS — E78.5 HYPERLIPIDEMIA LDL GOAL <70: ICD-10-CM

## 2020-01-16 DIAGNOSIS — I42.9 CARDIOMYOPATHY, UNSPECIFIED TYPE (H): ICD-10-CM

## 2020-01-16 DIAGNOSIS — I50.22 CHRONIC SYSTOLIC HEART FAILURE (H): Primary | ICD-10-CM

## 2020-01-16 DIAGNOSIS — I10 BENIGN ESSENTIAL HYPERTENSION: ICD-10-CM

## 2020-01-16 LAB
ANION GAP SERPL CALCULATED.3IONS-SCNC: 14.8 MMOL/L (ref 6–17)
BUN SERPL-MCNC: 57 MG/DL (ref 7–30)
CALCIUM SERPL-MCNC: 9.2 MG/DL (ref 8.5–10.5)
CHLORIDE SERPL-SCNC: 110 MMOL/L (ref 98–107)
CHOLEST SERPL-MCNC: 112 MG/DL
CO2 SERPL-SCNC: 21 MMOL/L (ref 23–29)
CREAT SERPL-MCNC: 3.41 MG/DL (ref 0.7–1.3)
GFR SERPL CREATININE-BSD FRML MDRD: 18 ML/MIN/{1.73_M2}
GLUCOSE SERPL-MCNC: 111 MG/DL (ref 70–105)
HDLC SERPL-MCNC: 41 MG/DL
LDLC SERPL CALC-MCNC: 58 MG/DL
NONHDLC SERPL-MCNC: 71 MG/DL
POTASSIUM SERPL-SCNC: 5.8 MMOL/L (ref 3.5–5.1)
SODIUM SERPL-SCNC: 140 MMOL/L (ref 136–145)
TRIGL SERPL-MCNC: 67 MG/DL

## 2020-01-16 PROCEDURE — 80048 BASIC METABOLIC PNL TOTAL CA: CPT | Performed by: NURSE PRACTITIONER

## 2020-01-16 PROCEDURE — 99214 OFFICE O/P EST MOD 30 MIN: CPT | Performed by: NURSE PRACTITIONER

## 2020-01-16 PROCEDURE — 36415 COLL VENOUS BLD VENIPUNCTURE: CPT | Performed by: NURSE PRACTITIONER

## 2020-01-16 PROCEDURE — 80061 LIPID PANEL: CPT | Performed by: NURSE PRACTITIONER

## 2020-01-16 ASSESSMENT — MIFFLIN-ST. JEOR: SCORE: 1915.97

## 2020-01-16 NOTE — LETTER
1/16/2020     Krzysztof Thakur MD  [address] [facility] [address]    RE: Ras Esparza       Dear Colleague,    I had the pleasure of seeing Ras Esparza in the [facility].    Cardiology Clinic Progress Note  Ras Esparza MRN# [id number]   YOB: 1955 Age: 64 year old   Primary Cardiologist: Dr. Adam  Reason for visit: cardiology follow up            Assessment and Plan:   Ras Esparza is a very pleasant 64 year old male with history of hypertension and tobacco use, who has not sought out routine care, recently hospitalizated 6/21/19-6/25/19 where he was diagnosed with HFrEF (likely ischemic), hypertension, acute kidney injury secondary to bladder outlet obstruction with bilateral hydronephrosis and hydroureter and anemia. Patient here today for cardiology follow up.     1.  Chronic systolic heart failure/HFrEF - Echo 10/15 shows improvement in LV function, EF 30% -> 46%, LV size normal, RV size and function. Etiology of cardiomyopathy unknown nuclear stress test completed which showed mild ischemia, no anginal symptoms, plan for continued medical management. Patient has been on optimal HF therapy. No ACEI/ARB or aldactone antagonist due to renal failure. Patient appears compensated and euvolemic today, weight has slightly increased since last OV (approx 5#), reports typically 235# at home, this weight gain appears related to increased caloric intake and decreased activity after hernia surgery. Patient is not in clinical HF on exam.   - NYHA class II, stage C   - Etiology : unknown, ? CAD/ICM mild ischemia on nuclear stress test, no anginal symptoms.    - Fluid status : euvolemic    - Goal weight: ~ 230#   - Diuretic regimen : none   - Last RHC : none   - Ischemic evaluation : Nuclear stress test showed mild ischemia, given no anginal symptoms and elevated creatinine decision made to continue medical management.    - Guideline directed medical therapy    -  Betablocker: carvedilol 12.5mg BID    - ACEI/ARB/ARNI: none r/t LEIGHA/CKD    - Aldactone antagonist: none r/t LEIGHA/CKD    - Continue isosorbide mononitrate 30mg daily    - Continue hydralazine 100mg TID   - Sudden cardiac death prophylaxis : N/A EF > 35%   - Reinforced HF education today, reviewed low sodium diet, reviewed when to notify clinic.     2. Chronic renal failure - secondary to bladder outlet obstruction with bilateral hydronephrosis and hydroureter, his initial creatinine was 9.59. Creatinine improved to ~ 3, kidney function today 3.41. S/p TURP.    - Following with nephrology   - Plan to recheck BMP in 1-2 weeks to ensure potassium level decreases and to monitor kidney function.     3. Hypertension - controlled, patient has only taken his hydralazine this morning, encouraged him to start taking his carvedilol in the morning. Continue current medication regimen.    - Advised to monitor BP at home and bring log to follow up appointment   - Counseled patient on lifestyle modifications to help manage BP    4. Former tobacco use - quit tobacco use February 2019. Has remained smoking cessation.     5. Hyperlipidemia - patient notes he stopped taking his atorvastatin 2 months ago, lipid panel 9/2019 showed total cholesterol 116, HDL 46, LDL 57 and triglycerides 67. Lipid panel today stable, total cholesterol 112, HDL 41, LDL 58 and triglycerides 67. No known coronary disease. Patient wishes to remain off atorvastatin, which is reasonable given controlled cholesterol levels.     6. Hyperkalemia - appears secondary to dietary indiscretions with high sodium foods. Counseled patient on high and low sodium foods.    - Recheck BMP in 1-2 weeks at [facility].         Changes today: none    Follow up plan:     Recheck labs (BMP) in 1 - 2 weeks     Follow up with me 3 months with labs prior, sooner as needed.         History of Presenting Illness:    Ras Esparza is a very pleasant 64 year old male with history of  hypertension and tobacco use, who has not sought out routine care, recently hospitalization where he was diagnosed with HFrEF (likely ischemic), hypertension, acute kidney injury secondary to bladder outlet obstruction with bilateral hydronephrosis and hydroureter and anemia.     Patient hospitalized 6/21/19-6/25/19 related to acute kidney injury secondary to bladder outlet obstruction with bilateral hydronephrosis and hydroureter, his initial creatinine was 9.59. Creatinine improved to 4.73 6/28/19 without needing dialysis. NTproBNP 45,492. Complaints of worsening exertional dyspnea, lower extremity edema and one episode of typical chest pain/diaphoresis lasting 2hrs approx 1-2 months ago. Echocardiogram shows EF 30% with severe global hypokinesis, LV moderately dilated, RV normal size/function, no significant valvular disease. Etiology of cardiomyopathy unknown at this time, ischemic cardiomyopathy not excluded. Coronary angiogram not pursued during hospital stay due to LEIGHA. Patient started on carvedilol, hydralazine and imdur. No ACEI/ARB or aldactone antagonist due to renal failure. Vascular calcifications noted on CT of abdomen/pelvis- started on statin therapy. Admission weight 256# Discharge weight 237#. Patient was discharged with milligan catheter and has been following with urology, underwent TURP on 11/21/19.    Patient was seen by Dr. Adam 9/30/19 at which point he was having no ischemic symptoms, clinically not in HF, did note presyncopal episodes for which event monitor was placed. Consideration for coronary angiogram was reviewed and ultimately decided to proceed with nuclear stress test. Blood pressure was elevated and hydralazine was increased. Echocardiogram and nuclear stress ordered.     Nuclear stress test 10/3 demonstrated partially reversible inferior and basal inferolateral defect. Small, reversible basal inferolateral defect suggests mild ischemia in the circumflex distribution. Dr. Adam  recommends continued medical management given mild basal inferolateral ischemia, no anginal symptoms, and continued elevated creatinine. Echocardiogram 10/15 showed improvement in LV function with an EF of 46%, RV normal size and function, no significant valvular disease, mild aortic root dilatation (stable). Event monitor showed no arrhythmias.     I first met patient in November 2019 for cardiology follow up, at which point patient was doing well from a heart failure standpoint. During his cardiology follow up with me his hydralazine has been optimized to control blood pressure and for management of his heart failure.     Patient is here today for cardiology follow up.     Patient reports feeling good. Had hernia surgery the end of December, states taking a while to recover from that. Weight has stabled out, reports typically ~ 235#, which is up about 5# since last clinic visit. Weight had increased but this was correlated with increased appetite. He also notes activity levels have been limited due to hernia surgery. Energy levels are good. Patient denies chest pain or chest tightness. Denies shortness of breath at rest. Denies exertional dyspnea, able to go up stairs and household chores with no difficulty. Denies lower extremity edema. Denies abdominal distention/bloating. Sleeping good. Denies orthopnea or PND. Denies dizziness, lightheadedness or other presyncopal symptoms, notes no recurrent symptoms. Denies tachycardia or palpitations. Denies hematochezia, hematuria, melena or hemoptysis. Has only taken his hydralazine this morning.     Labs from today show slight bump in kidney function, creatinine 3.41, potassium elevated at 5.8. States he has been drinking a lot of orange juice yesterday and today, which has been new, this could explain elevated potassium. Lipid panel today showed  total cholesterol 116, HDL 46, LDL 57 and triglycerides 67. Blood pressure 136/84 nd HR 60 in clinic today. Reports blood  "pressure at home occasionally, unable to recall readings.     Appetite good, \"too good\". Following renal diet closely. Limiting sodium intake. Eating meals at home, bringing lunch to work. Drinking 3 large cups coffee daily. No set exercise routine, getting activities with ADLs. Hope to join health club this next year after his is fully recovered from hernia surgery. Denies alcohol use - quit > 1 year ago. Quit tobacco use 2019. Has resumed working in IT.         Recent Hospitalizations   19-19 s/p TURP  19-19 related to acute kidney injury secondary to bladder outlet obstruction with bilateral hydronephrosis and hydroureter, his initial creatinine was 9.59. Creatinine improved to 4.73 19 without needing dialysis. NTproBNP 45,492. New diagnosis of cardiomyopathy with LVEF 30%.         Social History    , 1 daughter, on disability. Enjoys fishing. Enjoy the outdoors.   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[employer])   Social Needs     Financial resource strain: Not on file     Food insecurity:     Worry: Not on file     Inability: Not on file     Transportation needs:     Medical: Not on file     Non-medical: Not on file   Tobacco Use     Smoking status: Former Smoker     Packs/day: 0.50     Years: 30.00     Pack years: 15.00     Types: Cigarettes     Last attempt to quit: 2019     Years since quittin.6     Smokeless tobacco: Never Used     Tobacco comment: 12 cigarettes per day   Substance and Sexual Activity     Alcohol use: Not Currently     Alcohol/week: 0.0 standard drinks     Comment: quit in .      Drug use: Never     Sexual activity: Yes     Partners: Female   Lifestyle     Physical activity:     Days per week: Not on file     Minutes per session: Not " "on file     Stress: Not on file   Relationships     Social connections:     Talks on phone: Not on file     Gets together: Not on file     Attends [employer] service: Not on file     Active member of club or organization: Not on file     Attends meetings of clubs or organizations: Not on file     Relationship status: Not on file     Intimate partner violence:     Fear of current or ex partner: Not on file     Emotionally abused: Not on file     Physically abused: Not on file     Forced sexual activity: Not on file   Other Topics Concern     Parent/sibling w/ CABG, MI or angioplasty before 65F 55M? Not Asked   Social History Narrative     Not on file            Review of Systems:   Skin:  Negative     Eyes:  Positive for glasses  ENT:  Negative    Respiratory:  Negative    Cardiovascular:  Negative    Gastroenterology: Negative    Genitourinary:  Positive for    Musculoskeletal:  Negative    Neurologic:  Negative    Psychiatric:  Negative    Heme/Lymph/Imm:  Negative    Endocrine:  Negative           Physical Exam:   Vitals: /84   Pulse 60   Ht 1.816 m (5' 11.5\")   Wt 108.6 kg (239 lb 6.4 oz)   BMI 32.92 kg/m      Wt Readings from Last 4 Encounters:   01/16/20 108.6 kg (239 lb 6.4 oz)   12/17/19 106.1 kg (234 lb)   12/13/19 106.1 kg (234 lb)   12/11/19 106.1 kg (234 lb)     GEN: well nourished, in no acute distress.  HEENT:  Pupils equal, round. Sclerae nonicteric.   NECK: Supple, no masses appreciated. No JVD appreciated on exam.   C/V:  Regular rate and rhythm, no murmur, rub or gallop.    RESP: Respirations are unlabored. Clear to auscultation bilaterally without wheezing, rales, or rhonchi.  GI: Abdomen soft, nontender.  : milligan catheter in place  EXTREM: No LE edema.  NEURO: Alert and oriented, cooperative.  SKIN: Warm and dry.        Data:   ECHO 10/15/19  Left ventricular systolic function is mildly reduced.  LVEF 46% based on biplane 2D tracing. Mild global hypokinesia of the left  ventricle.  The " right ventricle is normal in structure, function and size.  No significant valvular abnormalities.  Mild aortic root dilatation. Max diameter of the visualized portion 4.2 cm.     This study was compared to a prior TTE from 6/22/2019. Global left ventricular  systolic funtion has improved. The aortic root is stable in size, previously  4.3cm, mildly dilated. The ascending aorta was previously measured at 4.1cm,  however it was measured at 3.7cm (within normal limits) on this present study.    Cardiac Event monitor - still in place    Nuclear stress test 10/3/2019  1.  Myocardial perfusion imaging using single isotope technique  demonstrated partially reversible inferior and basal inferolateral  defect.   The small, reversible basal inferolateral defect suggests mild  ischemia in the circumflex distribution.   The fixed inferior defect is likely due to diaphragmatic attenuation.   2. Gated images demonstrated mildly dilated left ventricle with  borderline Global hypokinesis.  The left ventricular systolic function  is mildly reduced at 40% post stress.  3. Compared to the prior study from  .      LIVER ENZYME RESULTS:  Lab Results   Component Value Date    AST 15 06/21/2019    ALT 38 06/21/2019     CBC RESULTS:  Lab Results   Component Value Date    WBC 8.2 10/31/2019    RBC 3.94 (L) 10/31/2019    HGB 12.1 (L) 11/22/2019    HCT 37.7 (L) 10/31/2019    MCV 96 10/31/2019    MCH 31.2 10/31/2019    MCHC 32.6 10/31/2019    RDW 13.8 10/31/2019     10/31/2019     BMP RESULTS:  Lab Results   Component Value Date     01/16/2020    POTASSIUM 5.8 (H) 01/16/2020    CHLORIDE 110 (H) 01/16/2020    CO2 21 (L) 01/16/2020    ANIONGAP 14.8 01/16/2020     (H) 01/16/2020    BUN 57 (H) 01/16/2020    CR 3.41 (H) 01/16/2020    GFRESTIMATED 18 (L) 01/16/2020    GFRESTBLACK 22 (L) 01/16/2020    ELICIA 9.2 01/16/2020             Medications     Current Outpatient Medications   Medication Sig Dispense Refill     acetaminophen  (TYLENOL) 325 MG tablet Take 2 tablets (650 mg) by mouth every 4 hours as needed for other (mild pain) 100 tablet 0     carvedilol (COREG) 12.5 MG tablet Take 1 tablet (12.5 mg) by mouth 2 times daily (with meals) 180 tablet 3     Ferrous Gluconate 240 (27 Fe) MG TABS Take 1 tablet by mouth daily        hydrALAZINE (APRESOLINE) 100 MG tablet Take 1 tablet (100 mg) by mouth 3 times daily 270 tablet 1     HYDROcodone-acetaminophen (NORCO) 5-325 MG tablet Take 1-2 tablets by mouth every 4 hours as needed for moderate to severe pain 10 tablet 0     isosorbide mononitrate (IMDUR) 30 MG 24 hr tablet Take 1 tablet (30 mg) by mouth daily 90 tablet 3     magnesium oxide (MAG-OX) 400 MG tablet Take 1 tablet (400 mg) by mouth 2 times daily 60 tablet 0          Past Medical History     Past Medical History:   Diagnosis Date     Anemia, unspecified type 7/2/2019     Benign essential hypertension 11/5/2019     Bladder outflow obstruction 7/2/2019     Cardiomyopathy, unspecified type (H) 7/2/2019     CKD (chronic kidney disease) stage 4, GFR 15-29 ml/min (H) 7/2/2019     Hyperlipidemia LDL goal <70 8/25/2019     Tobacco abuse      Past Surgical History:   Procedure Laterality Date     HERNIORRHAPHY INGUINAL Left 12/19/2019    Procedure: OPEN REPAIR LEFT INGUINAL HERNIA WITH MESH;  Surgeon: Rao Schmidt MD;  Location:  OR     LASER KTP GREEN LIGHT PHOTOSELECTIVE VAPORIZATION PROSTATE N/A 11/21/2019    Procedure: GREEN LIGHT LASER VAPORIZATION OF THE PROSTATE;  Surgeon: Lencho Germain MD;  Location:  OR     Family History   Problem Relation Age of Onset     Glaucoma Mother      Throat cancer Father      Rheumatoid Arthritis Sister      Alcoholism Brother      Liver Disease Brother             Allergies   Patient has no known allergies.        MAGNOLIA Sheikh [facility]  Pager: [phone]      Thank you for allowing me to participate in the care of your patient.      Sincerely,     Raquel Tran  APRN CNP     [facility]

## 2020-01-16 NOTE — PATIENT INSTRUCTIONS
1. No medication changes  2. Get labs (BMP) recheck in 1-2 weeks at [facility] to monitor potasium levels and kidney function  3. Follow up with Raquel in 3 months with labs prior  4. Please call with any additional questions/concerns [phone]

## 2020-07-10 ENCOUNTER — TELEPHONE (OUTPATIENT)
Dept: INTERNAL MEDICINE | Facility: CLINIC | Age: 65
End: 2020-07-10

## 2020-07-10 ENCOUNTER — ALLIED HEALTH/NURSE VISIT (OUTPATIENT)
Dept: NURSING | Facility: CLINIC | Age: 65
End: 2020-07-10
Payer: COMMERCIAL

## 2020-07-10 VITALS — HEART RATE: 60 BPM | DIASTOLIC BLOOD PRESSURE: 85 MMHG | SYSTOLIC BLOOD PRESSURE: 131 MMHG

## 2020-07-10 DIAGNOSIS — Z01.30 BLOOD PRESSURE CHECK: Primary | ICD-10-CM

## 2020-07-10 DIAGNOSIS — I42.9 CARDIOMYOPATHY, UNSPECIFIED TYPE (H): ICD-10-CM

## 2020-07-10 LAB
ANION GAP SERPL CALCULATED.3IONS-SCNC: 7 MMOL/L (ref 3–14)
BUN SERPL-MCNC: 43 MG/DL (ref 7–30)
CALCIUM SERPL-MCNC: 8.6 MG/DL (ref 8.5–10.1)
CHLORIDE SERPL-SCNC: 115 MMOL/L (ref 94–109)
CO2 SERPL-SCNC: 17 MMOL/L (ref 20–32)
CREAT SERPL-MCNC: 2.78 MG/DL (ref 0.66–1.25)
GFR SERPL CREATININE-BSD FRML MDRD: 23 ML/MIN/{1.73_M2}
GLUCOSE SERPL-MCNC: 87 MG/DL (ref 70–99)
POTASSIUM SERPL-SCNC: 4.7 MMOL/L (ref 3.4–5.3)
SODIUM SERPL-SCNC: 139 MMOL/L (ref 133–144)

## 2020-07-10 PROCEDURE — 80048 BASIC METABOLIC PNL TOTAL CA: CPT | Performed by: NURSE PRACTITIONER

## 2020-07-10 PROCEDURE — 36415 COLL VENOUS BLD VENIPUNCTURE: CPT | Performed by: NURSE PRACTITIONER

## 2020-07-10 NOTE — PROGRESS NOTES
I met with Ras Esparza at the request of  to recheck his blood pressure.  Blood pressure medications on the med list were reviewed with patient.    Patient has taken all medications as per usual regimen: yes  Patient reports tolerating them without any issues or concerns: yes    Vitals:    07/10/20 1541   BP: 131/85   BP Location: Left arm   Patient Position: Sitting   Cuff Size: Adult Regular   Pulse: 60       Blood pressure was taken, previous encounter was reviewed, recorded blood pressure below 140/90. Patient was discharged and the note will be sent to the provider for final review.

## 2020-07-10 NOTE — LETTER
[facility]  [address] [address]  [phone]      7/15/2020     Regarding:  Ras Esparza  [address] [facility] [address]    1955         To:   DAVIS  [address]   [facility], [address]   Attention Emilee Barrientos (Human Resources)    Fax: [phone]      Emilee,   I am asked to write this letter in regards to my patient Ras Esparza and his request for accommodations to work at home.  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  He has been working from home and states this is going well. Mr Esparza has a history of chronic kidney disease, hypertension and heart failure.  Because of his medical history, he is at high risk for medical complications if he became infected by COVID-19 (coronavirus).  Therefore, it is medically recommended that he limit exposure risk as much as possible.   Working in the call center around other employees would increase the risk of exposures to infectious diseases from those other employees even if steps are being taken in the workplace to reduce transmission vectors. Therefore I would request that he be given an accommodation to continue working from home as he is doing now rather than having to come into work at a call center   unless his job specifically could not be performed as he is currently doing from home. If you have further questions regarding this matter, please feel free to contact me at [phone]    Sincerely,          Krzysztof Thakur MD  Internal Medicine

## 2020-07-10 NOTE — TELEPHONE ENCOUNTER
DAVIS Disability form    Reason for Call:  Form, our goal is to have forms completed with 72 hours, however, some forms may require a visit or additional information.    Type of letter, form or note:  disability    Who is the form from?: Patient    Where did the form come from: Patient or family brought in       What clinic location was the form placed at?: Internal Medicine    Where the form was placed: Given to physician    What number is listed as a contact on the form?: [phone]       Additional comments:     Call taken on 7/10/2020 at 4:05 PM by Anusha Daniels

## 2020-07-13 ENCOUNTER — VIRTUAL VISIT (OUTPATIENT)
Dept: INTERNAL MEDICINE | Facility: CLINIC | Age: 65
End: 2020-07-13
Payer: COMMERCIAL

## 2020-07-13 DIAGNOSIS — N18.4 CKD (CHRONIC KIDNEY DISEASE) STAGE 4, GFR 15-29 ML/MIN (H): Primary | ICD-10-CM

## 2020-07-13 DIAGNOSIS — D64.9 ANEMIA, UNSPECIFIED TYPE: ICD-10-CM

## 2020-07-13 DIAGNOSIS — I42.9 CARDIOMYOPATHY, UNSPECIFIED TYPE (H): ICD-10-CM

## 2020-07-13 DIAGNOSIS — I10 BENIGN ESSENTIAL HYPERTENSION: ICD-10-CM

## 2020-07-13 DIAGNOSIS — E21.3 HYPERPARATHYROIDISM (H): ICD-10-CM

## 2020-07-13 PROCEDURE — 99214 OFFICE O/P EST MOD 30 MIN: CPT | Mod: GT | Performed by: INTERNAL MEDICINE

## 2020-07-13 NOTE — PROGRESS NOTES
"Ras Esparza is a 65 year old male who is being evaluated via a billable video visit.      The patient has been notified of following:     \"This video visit will be conducted via a call between you and your physician/provider. We have found that certain health care needs can be provided without the need for an in-person physical exam.  This service lets us provide the care you need with a video conversation.  If a prescription is necessary we can send it directly to your pharmacy.  If lab work is needed we can place an order for that and you can then stop by our lab to have the test done at a later time.    Video visits are billed at different rates depending on your insurance coverage.  Please reach out to your insurance provider with any questions.    If during the course of the call the physician/provider feels a video visit is not appropriate, you will not be charged for this service.\"    Patient has given verbal consent for Video visit? Yes  How would you like to obtain your AVS? [place]  Patient would like the video invitation sent by: Send to e-mail at: [email or web address]  Will anyone else be joining your video visit? No      Subjective     Ras Esparza is a 65 year old male who presents today via video visit for the following health issues:    HPI  forms        Video Start Time: 4:32pm    Pt's past medical history, family history, habits, medications and allergies were reviewed with the patient today.  Most recent lab results reviewed with pt. Problem list and histories reviewed & adjusted, as indicated.  Additional history as below:    HPI:  History of chronic kidney disease and previous heart failure.  Currently, patient denies chest pain, shortness of breath.  No orthopnea or PND.  Rare nocturia.  Patient has gained 6 pounds in the last 6 months.  Notices a trace amount of swelling in the ankles.  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  He " states this is going well for him.  His job is trying to determine who needs to start working in the office and who can stay working at home.  Because of medical risks if exposed to  Covid, patient requests a letter to his company authorizing him to maintain working from home  Denies other new acute complaints.  Blood pressure readings with recent curb side check with nurse was 131/85.  Patient states he checks blood pressures at home and all have been less than 130 systolically recently    Component      Latest Ref Rng & Units 9/30/2019 10/31/2019 11/21/2019 11/22/2019   Sodium      133 - 144 mmol/L 138  142 140   Potassium      3.4 - 5.3 mmol/L 5.0  4.8 4.8   Chloride      94 - 109 mmol/L 110 (H)  116 (H) 116 (H)   Carbon Dioxide      20 - 32 mmol/L 19 (L)  20 18 (L)   Anion Gap      3 - 14 mmol/L 14  6 6   Glucose      70 - 99 mg/dL 118 (H)  96 104 (H)   Urea Nitrogen      7 - 30 mg/dL 53 (H)  68 (H) 52 (H)   Creatinine      0.66 - 1.25 mg/dL 3.09 (H)  3.38 (H) 3.11 (H)   GFR Estimate      >60 mL/min/1.73:m2 21 (L)  18 (L) 20 (L)   GFR Estimate If Black      >60 mL/min/1.73:m2 25 (L)  21 (L) 23 (L)   Calcium      8.5 - 10.1 mg/dL 9.8  8.8 8.2 (L)   Cholesterol      <200 mg/dL       Triglycerides      <150 mg/dL       HDL Cholesterol      >39 mg/dL       LDL Cholesterol Calculated      <100 mg/dL       Non HDL Cholesterol      <130 mg/dL       Iron      35 - 180 ug/dL 92      Iron Binding Cap      240 - 430 ug/dL 294      Iron Saturation Index      15 - 46 % 31      Hemoglobin      13.3 - 17.7 g/dL 13.2 (L)   12.1 (L)   Parathyroid Hormone Intact      18 - 80 pg/mL  171 (H)       Component      Latest Ref Rng & Units 1/16/2020 7/10/2020   Sodium      133 - 144 mmol/L 140 139   Potassium      3.4 - 5.3 mmol/L 5.8 (H) 4.7   Chloride      94 - 109 mmol/L 110 (H) 115 (H)   Carbon Dioxide      20 - 32 mmol/L 21 (L) 17 (L)   Anion Gap      3 - 14 mmol/L 14.8 7   Glucose      70 - 99 mg/dL 111 (H) 87   Urea Nitrogen       7 - 30 mg/dL 57 (H) 43 (H)   Creatinine      0.66 - 1.25 mg/dL 3.41 (H) 2.78 (H)   GFR Estimate      >60 mL/min/1.73:m2 18 (L) 23 (L)   GFR Estimate If Black      >60 mL/min/1.73:m2 22 (L) 27 (L)   Calcium      8.5 - 10.1 mg/dL 9.2 8.6   Cholesterol      <200 mg/dL 112    Triglycerides      <150 mg/dL 67    HDL Cholesterol      >39 mg/dL 41    LDL Cholesterol Calculated      <100 mg/dL 58    Non HDL Cholesterol      <130 mg/dL 71    Iron      35 - 180 ug/dL     Iron Binding Cap      240 - 430 ug/dL     Iron Saturation Index      15 - 46 %     Hemoglobin      13.3 - 17.7 g/dL     Parathyroid Hormone Intact      18 - 80 pg/mL            Additional ROS:   Constitutional, HEENT, Cardiovascular, Pulmonary, GI and , Neuro, MSK and Psych review of systems/symptoms are otherwise negative or unchanged from previous, except as noted above.           Objective     See above      Physical Exam   GENERAL:   alert and no distress  EYES: Eyes grossly normal to inspection, conjunctivae and sclerae normal  RESP: no audible wheeze, cough, or visible cyanosis.  No visible retractions or increased work of breathing.  Able to speak fully in complete sentences.  NEURO: Cranial nerves grossly intact, mentation intact and speech normal  PSYCH: mentation appears normal, affect normal/bright, judgement and insight intact, normal speech and appearance well-groomed  EXT: Trace  Edema BLE     ASSESSMENT:  1. CKD (chronic kidney disease) stage 4, GFR 15-29 ml/min (H)  Improving.  Continue current medications.  Recheck labs 3 months  - Comprehensive metabolic panel; Future  - CBC with platelets; Future  - Vitamin D Deficiency; Future  - Phosphorus; Future    2. Cardiomyopathy, unspecified type (H)  Clinically euvolemic.  Patient due for follow-up cardiology appointment and echo in 3 months.  Will contact clinic earlier if any issues with shortness of breath, orthopnea, etc.    3. Hyperparathyroidism (H)  Related to #1.  Needs lab follow-up and  vitamin D levels to rule out deficiency  - Vitamin D Deficiency; Future  - Parathyroid Hormone Intact; Future    4. Benign essential hypertension  Recent nurse only blood pressure check a little bit above goal but patient reports home blood pressures all less than 130 systolically.  Continue current medication for now.  Recheck blood pressure with cardiology visit in 3 months  - Comprehensive metabolic panel; Future    5. Anemia, unspecified type  Patient denies seeing blood in his stools.  Likely related to #1.  Labs as ordered  - CBC with platelets; Future  - Iron and iron binding capacity; Future  - Vitamin B12; Future  - Reticulocyte count; Future      PLAN:  Continue medications  Letter written for patient's work requesting he be able to work from home as he is doing now without difficulty  Call  [phone] or use [employer] to schedule a future lab appointment  non-fasting in 3 months.   For fasting labs, please refrain from eating for 8 hours or more.   Drink 2 glasses of water before your lab appointment. It is fine to take your  oral medications on the morning of the lab test as usual  I would recommend you receive an influenza (flu) vaccine  this Fall (September or October)  Follow-up with cardiology in October after labs are drawn to have echo redone and general cardiology follow-up consultation with  history of cardiomyopathy  See me in 6 months in clinic or earlier as needed          Video-Visit Details    Type of service:  Video Visit    Video End Time:4:46pm    Originating Location (pt. Location): Home    Distant Location (provider location):  [facility]     Platform used for Video Visit: Mark Thakur MD  Internal Medicine Department  [facility]        (Chart documentation was completed, in part, with [employer] voice-recognition software. Even though reviewed, some grammatical, spelling, and word errors may remain.)

## 2020-07-15 NOTE — TELEPHONE ENCOUNTER
"Called Patient, he stated that he spoke with Dr. Thakur and gave the \"okay\" re: the wording of the letter. Letter faxed to [facility]. A copy has been emailed to Patient at his request.   "

## 2020-07-15 NOTE — TELEPHONE ENCOUNTER
Letter done and in [place] basket. Please fax. Discussed with pt and read letter to him and he is OK with wording.

## 2020-07-16 PROBLEM — K40.90 LEFT INGUINAL HERNIA: Status: RESOLVED | Noted: 2019-12-13 | Resolved: 2020-07-16

## 2020-07-16 PROBLEM — N32.0 BLADDER OUTFLOW OBSTRUCTION: Status: RESOLVED | Noted: 2019-07-02 | Resolved: 2020-07-16

## 2020-07-16 PROBLEM — E21.3 HYPERPARATHYROIDISM (H): Status: ACTIVE | Noted: 2020-07-16

## 2020-07-16 PROBLEM — R31.9 HEMATURIA: Status: RESOLVED | Noted: 2019-11-21 | Resolved: 2020-07-16

## 2020-07-17 NOTE — PATIENT INSTRUCTIONS
Continue medications  Letter written for patient's work requesting he be able to work from home as he is doing now without difficulty  Call  [phone] or use [facility] to schedule a future lab appointment  non-fasting in 3 months.   For fasting labs, please refrain from eating for 8 hours or more.   Drink 2 glasses of water before your lab appointment. It is fine to take your  oral medications on the morning of the lab test as usual  I would recommend you receive an influenza (flu) vaccine  this Fall (September or October)  Follow-up with cardiology in October after labs are drawn to have echo redone and general cardiology follow-up consultation with  history of cardiomyopathy  See me in 6 months in clinic or earlier as needed

## 2020-07-28 ENCOUNTER — TELEPHONE (OUTPATIENT)
Dept: INTERNAL MEDICINE | Facility: CLINIC | Age: 65
End: 2020-07-28

## 2020-07-28 NOTE — TELEPHONE ENCOUNTER
Reason for call:  Form   Our goal is to have forms completed within 72 hours, however some forms may require a visit or additional information.     Who is the form from? [employer] (if other please explain)  Where did the form come from? form was faxed in  What clinic [facility] the [facility]  Where was the form placed? Pcp's Folder  What number is listed as a contact on the form?     Phone call message - patient request for a letter, form or note:     Date needed: as soon as possible  Please fax to [phone]  Has the patient signed a consent form for release of information? NO    Additional comments:     Type of letter, form or note: [facility] Form    Phone number to reach patient:  Cell number on file:    Telephone Information:   Mobile [phone]       Best Time:      Can we leave a detailed message on this number?  YES    Travel screening: Not Applicable

## 2020-12-27 ENCOUNTER — HEALTH MAINTENANCE LETTER (OUTPATIENT)
Age: 65
End: 2020-12-27

## 2021-02-08 DIAGNOSIS — I42.9 CARDIOMYOPATHY, UNSPECIFIED TYPE (H): ICD-10-CM

## 2021-02-08 RX ORDER — ISOSORBIDE MONONITRATE 30 MG/1
30 TABLET, EXTENDED RELEASE ORAL DAILY
Qty: 90 TABLET | Refills: 0 | Status: SHIPPED | OUTPATIENT
Start: 2021-02-08 | End: 2021-03-29

## 2021-02-08 RX ORDER — HYDRALAZINE HYDROCHLORIDE 100 MG/1
100 TABLET, FILM COATED ORAL 3 TIMES DAILY
Qty: 270 TABLET | Refills: 0 | Status: SHIPPED | OUTPATIENT
Start: 2021-02-08 | End: 2021-03-29

## 2021-02-08 RX ORDER — CARVEDILOL 12.5 MG/1
12.5 TABLET ORAL 2 TIMES DAILY WITH MEALS
Qty: 180 TABLET | Refills: 0 | Status: SHIPPED | OUTPATIENT
Start: 2021-02-08 | End: 2021-03-29

## 2021-02-08 NOTE — TELEPHONE ENCOUNTER
Team 1 received message that pt requested refills. Pt of DIALLO García and Dr. Adam. Last visit on 1/16/20 with Raquel. Pt was to follow up in 3 months. Called and spoke with pt, reviewed he is overdue for follow up. Pt was agreeable to scheduling an appointment. Scheduled pt to see Dr. Adam on 3/29/21 at 10:15 am. Pt stated he will be out of medications in a few days, requested refills for carvedilol 6.25 mg twice daily, isosorbide mononitrate 30 mg daily and hydralazine 100 mg three times daily. Prescriptions sent to [facility] pharmacy for 90 day supply and 0 refills.

## 2021-03-12 ENCOUNTER — IMMUNIZATION (OUTPATIENT)
Dept: NURSING | Facility: CLINIC | Age: 66
End: 2021-03-12
Payer: COMMERCIAL

## 2021-03-12 PROCEDURE — 91300 PR COVID VAC PFIZER DIL RECON 30 MCG/0.3 ML IM: CPT

## 2021-03-12 PROCEDURE — 0001A PR COVID VAC PFIZER DIL RECON 30 MCG/0.3 ML IM: CPT

## 2021-03-29 ENCOUNTER — OFFICE VISIT (OUTPATIENT)
Dept: CARDIOLOGY | Facility: CLINIC | Age: 66
End: 2021-03-29
Payer: COMMERCIAL

## 2021-03-29 VITALS
HEIGHT: 72 IN | BODY MASS INDEX: 37.5 KG/M2 | HEART RATE: 62 BPM | DIASTOLIC BLOOD PRESSURE: 89 MMHG | SYSTOLIC BLOOD PRESSURE: 149 MMHG | WEIGHT: 276.9 LBS

## 2021-03-29 DIAGNOSIS — I42.9 CARDIOMYOPATHY, UNSPECIFIED TYPE (H): ICD-10-CM

## 2021-03-29 PROCEDURE — 99214 OFFICE O/P EST MOD 30 MIN: CPT | Performed by: INTERNAL MEDICINE

## 2021-03-29 RX ORDER — CARVEDILOL 12.5 MG/1
12.5 TABLET ORAL 2 TIMES DAILY WITH MEALS
Qty: 180 TABLET | Refills: 3 | Status: SHIPPED | OUTPATIENT
Start: 2021-03-29 | End: 2022-07-25

## 2021-03-29 RX ORDER — HYDRALAZINE HYDROCHLORIDE 100 MG/1
100 TABLET, FILM COATED ORAL 3 TIMES DAILY
Qty: 270 TABLET | Refills: 3 | Status: SHIPPED | OUTPATIENT
Start: 2021-03-29 | End: 2022-07-25

## 2021-03-29 RX ORDER — ISOSORBIDE MONONITRATE 30 MG/1
60 TABLET, EXTENDED RELEASE ORAL DAILY
Qty: 180 TABLET | Refills: 3 | Status: SHIPPED | OUTPATIENT
Start: 2021-03-29 | End: 2022-07-25

## 2021-03-29 ASSESSMENT — MIFFLIN-ST. JEOR: SCORE: 2084.01

## 2021-03-29 NOTE — PROGRESS NOTES
HPI and Plan:   Mr. Esparza is a very pleasant 66-year-old gentleman who was admitted in 06/2019 with acute renal failure secondary to bladder outlet obstruction with bilateral hydronephrosis and hydroureter and was found to have newly diagnosed cardiomyopathy with reduced LV systolic function with LVEF around 30% with moderately dilated LV and global hypokinesia.  At that time, his creatinine was 9 and it slowly down trended and has eventually has improved to around 3.  He never had any anginal symptoms like exertional chest discomfort or tightness and underwent Lexiscan stress test that showed partially visible inferior and basal inferolateral defect suggestive of mild ischemia.  Subsequent echocardiogram showed improvement LV function to 46%.  Due to mild burden of ischemia, clinically no anginal symptoms and significantly elevated creatinine medical management for CAD was adopted.  In the follow-up he was seen by Marisela and I am today seeing him for routine follow-up.  Patient tells me overall healthwise he feels well.  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  He denies any exertional symptoms or chest discomfort or shortness of breath any orthopnea any dizziness presyncope or syncope.  He is on carvedilol 25 g twice daily, Imdur 30 mg daily, hydralazine 100 g 3 times daily.  In addition is on baby aspirin lipid 4 mg daily.  LDL is well controlled at 58.  He does not use any tobacco or alcohol.  Patient tells me that he has noted his systolic blood pressure recordings and 140s to 150s.  Today's blood pressure clinic was 149/89.    Assessment plan  A pleasant 66-year-old gentleman with history of cardiomyopathy initially severe reduced LV 7 function now mildly reduced LVEF of 46% on last echocardiogram, history of obstructive uropathy now with chronic kidney disease baseline creatinine around 3, CAD on the  basis of mild inferior basal inferolateral wall ischemia clinically no anginal symptoms.  Other comas of hypertension, obesity with the gradual weight gain of around 30 pounds over the last 1 year.  Overall cardiac status wise he appears stable without any symptoms of angina or congestive heart failure.  On examination he appears euvolemic.  I feel that the weight gain is nonfluid gradual weight gain mainly due to lifestyle modifications due to pandemic here.  We talked at length about to help him lose weight by increasing physical activity and also dietary changes avoiding simple carbohydrates.  Blood pressure is elevated I recommend increasing isosorbide to 60 mg daily.  I recommend follow-up in next 2 to 3 weeks which can be with primary care physician for blood pressure control.  Further increase in Imdur can be also done or medication like amlodipine can also be added if needed.  Regarding CAD given asymptomatic status, advanced chronic kidney disease and only mild burden of ischemia on stress test we will continue medical management with aspirin, high intensity statin, beta-blocker.  We can see him back in 6 months with a repeat echocardiogram, sooner if he notes any change in clinical status especially exertion related symptoms or orthopnea or any pedal edema.    Orders Placed This Encounter   Procedures     Follow-Up with Cardiologist     Echocardiogram Complete       Orders Placed This Encounter   Medications     hydrALAZINE (APRESOLINE) 100 MG tablet     Sig: Take 1 tablet (100 mg) by mouth 3 times daily     Dispense:  270 tablet     Refill:  3     isosorbide mononitrate (IMDUR) 30 MG 24 hr tablet     Sig: Take 2 tablets (60 mg) by mouth daily     Dispense:  180 tablet     Refill:  3     carvedilol (COREG) 12.5 MG tablet     Sig: Take 1 tablet (12.5 mg) by mouth 2 times daily (with meals)     Dispense:  180 tablet     Refill:  3       Medications Discontinued During This Encounter   Medication Reason      isosorbide mononitrate (IMDUR) 30 MG 24 hr tablet Reorder     hydrALAZINE (APRESOLINE) 100 MG tablet Reorder     carvedilol (COREG) 12.5 MG tablet Reorder         Encounter Diagnosis   Name Primary?     Cardiomyopathy, unspecified type (H)        CURRENT MEDICATIONS:  Current Outpatient Medications   Medication Sig Dispense Refill     acetaminophen (TYLENOL) 325 MG tablet Take 2 tablets (650 mg) by mouth every 4 hours as needed for other (mild pain) 100 tablet 0     carvedilol (COREG) 12.5 MG tablet Take 1 tablet (12.5 mg) by mouth 2 times daily (with meals) 180 tablet 3     Ferrous Gluconate 240 (27 Fe) MG TABS Take 1 tablet by mouth daily        hydrALAZINE (APRESOLINE) 100 MG tablet Take 1 tablet (100 mg) by mouth 3 times daily 270 tablet 3     isosorbide mononitrate (IMDUR) 30 MG 24 hr tablet Take 2 tablets (60 mg) by mouth daily 180 tablet 3     magnesium oxide (MAG-OX) 400 MG tablet Take 1 tablet (400 mg) by mouth 2 times daily 60 tablet 0       ALLERGIES   No Known Allergies    PAST MEDICAL HISTORY:  Past Medical History:   Diagnosis Date     Anemia, unspecified type 7/2/2019     Benign essential hypertension 11/5/2019     Bladder outflow obstruction 7/2/2019     Cardiomyopathy, unspecified type (H) 7/2/2019     CKD (chronic kidney disease) stage 4, GFR 15-29 ml/min (H) 7/2/2019     Hyperlipidemia LDL goal <70 8/25/2019     Tobacco abuse        PAST SURGICAL HISTORY:  Past Surgical History:   Procedure Laterality Date     HERNIORRHAPHY INGUINAL Left 12/19/2019    Procedure: OPEN REPAIR LEFT INGUINAL HERNIA WITH MESH;  Surgeon: Rao Schmidt MD;  Location:  OR     LASER KTP GREEN LIGHT PHOTOSELECTIVE VAPORIZATION PROSTATE N/A 11/21/2019    Procedure: GREEN LIGHT LASER VAPORIZATION OF THE PROSTATE;  Surgeon: Lencho Germain MD;  Location:  OR       FAMILY HISTORY:  Family History   Problem Relation Age of Onset     Glaucoma Mother      Throat cancer Father      Rheumatoid Arthritis Sister       Alcoholism Brother      Liver Disease Brother        SOCIAL HISTORY:  Social History     Socioeconomic History     Marital status:      Spouse name: None     Number of children: 1     Years of education: None     Highest education level: None   Occupational History     Comment: tech support (IT)   Social Needs     Financial resource strain: None     Food insecurity     Worry: None     Inability: None     Transportation needs     Medical: None     Non-medical: None   Tobacco Use     Smoking status: Former Smoker     Packs/day: 0.50     Years: 30.00     Pack years: 15.00     Types: Cigarettes     Quit date: 2019     Years since quittin.8     Smokeless tobacco: Never Used     Tobacco comment: 12 cigarettes per day   Substance and Sexual Activity     Alcohol use: Not Currently     Alcohol/week: 0.0 standard drinks     Comment: quit in 2018.      Drug use: Never     Sexual activity: Yes     Partners: Female   Lifestyle     Physical activity     Days per week: None     Minutes per session: None     Stress: None   Relationships     Social connections     Talks on phone: None     Gets together: None     Attends Alevism service: None     Active member of club or organization: None     Attends meetings of clubs or organizations: None     Relationship status: None     Intimate partner violence     Fear of current or ex partner: None     Emotionally abused: None     Physically abused: None     Forced sexual activity: None   Other Topics Concern     Parent/sibling w/ CABG, MI or angioplasty before 65F 55M? Not Asked   Social History Narrative     None       Review of Systems:  Skin:  Negative       Eyes:  Positive for glasses    ENT:  Positive for   runny nose  Respiratory:  Negative       Cardiovascular:  Negative;chest pain;palpitations;dizziness;syncope or near-syncope;cyanosis;exercise intolerance;lightheadedness;fatigue;edema      Gastroenterology: Negative      Genitourinary:  Negative       Musculoskeletal:  Negative      Neurologic:  Negative      Psychiatric:  Negative      Heme/Lymph/Imm:  Negative      Endocrine:  Negative        Physical Exam:  Vitals: BP (!) 149/89 (BP Location: Left arm, Cuff Size: Adult Large)   Pulse 62   Ht 1.829 m (6')   Wt 125.6 kg (276 lb 14.4 oz)   BMI 37.55 kg/m      General patient appears comfortable  Neck normal JVP  Cardiovascular some S1-S2 normal no murmur rub or gallop next respite system clear to auscultation  Abdomen soft, obese  Extremities no edema  Neurological alert, oriented      CC  No referring provider defined for this encounter.

## 2021-03-29 NOTE — LETTER
3/29/2021    Krzysztof Thakur MD  [address] [facility] [address]    RE: Ras Esparza       Dear Colleague,    I had the pleasure of seeing Ras Esparza in the [facility].    HPI and Plan:   Mr. Esparza is a very pleasant 66-year-old gentleman who was admitted in 06/2019 with acute renal failure secondary to bladder outlet obstruction with bilateral hydronephrosis and hydroureter and was found to have newly diagnosed cardiomyopathy with reduced LV systolic function with LVEF around 30% with moderately dilated LV and global hypokinesia.  At that time, his creatinine was 9 and it slowly down trended and has eventually has improved to around 3.  He never had any anginal symptoms like exertional chest discomfort or tightness and underwent Lexiscan stress test that showed partially visible inferior and basal inferolateral defect suggestive of mild ischemia.  Subsequent echocardiogram showed improvement LV function to 46%.  Due to mild burden of ischemia, clinically no anginal symptoms and significantly elevated creatinine medical management for CAD was adopted.  In the follow-up he was seen by Marisela and I am today seeing him for routine follow-up.  Patient tells me overall healthwise he feels well.  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  He denies any exertional symptoms or chest discomfort or shortness of breath any orthopnea any dizziness presyncope or syncope.  He is on carvedilol 25 g twice daily, Imdur 30 mg daily, hydralazine 100 g 3 times daily.  In addition is on baby aspirin lipid 4 mg daily.  LDL is well controlled at 58.  He does not use any tobacco or alcohol.  Patient tells me that he has noted his systolic blood pressure recordings and 140s to 150s.  Today's blood pressure clinic was 149/89.    Assessment plan  A pleasant  66-year-old gentleman with history of cardiomyopathy initially severe reduced LV 7 function now mildly reduced LVEF of 46% on last echocardiogram, history of obstructive uropathy now with chronic kidney disease baseline creatinine around 3, CAD on the basis of mild inferior basal inferolateral wall ischemia clinically no anginal symptoms.  Other comas of hypertension, obesity with the gradual weight gain of around 30 pounds over the last 1 year.  Overall cardiac status wise he appears stable without any symptoms of angina or congestive heart failure.  On examination he appears euvolemic.  I feel that the weight gain is nonfluid gradual weight gain mainly due to lifestyle modifications due to pandemic here.  We talked at length about to help him lose weight by increasing physical activity and also dietary changes avoiding simple carbohydrates.  Blood pressure is elevated I recommend increasing isosorbide to 60 mg daily.  I recommend follow-up in next 2 to 3 weeks which can be with primary care physician for blood pressure control.  Further increase in Imdur can be also done or medication like amlodipine can also be added if needed.  Regarding CAD given asymptomatic status, advanced chronic kidney disease and only mild burden of ischemia on stress test we will continue medical management with aspirin, high intensity statin, beta-blocker.  We can see him back in 6 months with a repeat echocardiogram, sooner if he notes any change in clinical status especially exertion related symptoms or orthopnea or any pedal edema.    Orders Placed This Encounter   Procedures     Follow-Up with Cardiologist     Echocardiogram Complete       Orders Placed This Encounter   Medications     hydrALAZINE (APRESOLINE) 100 MG tablet     Sig: Take 1 tablet (100 mg) by mouth 3 times daily     Dispense:  270 tablet     Refill:  3     isosorbide mononitrate (IMDUR) 30 MG 24 hr tablet     Sig: Take 2 tablets (60 mg) by mouth daily     Dispense:   180 tablet     Refill:  3     carvedilol (COREG) 12.5 MG tablet     Sig: Take 1 tablet (12.5 mg) by mouth 2 times daily (with meals)     Dispense:  180 tablet     Refill:  3       Medications Discontinued During This Encounter   Medication Reason     isosorbide mononitrate (IMDUR) 30 MG 24 hr tablet Reorder     hydrALAZINE (APRESOLINE) 100 MG tablet Reorder     carvedilol (COREG) 12.5 MG tablet Reorder         Encounter Diagnosis   Name Primary?     Cardiomyopathy, unspecified type (H)        CURRENT MEDICATIONS:  Current Outpatient Medications   Medication Sig Dispense Refill     acetaminophen (TYLENOL) 325 MG tablet Take 2 tablets (650 mg) by mouth every 4 hours as needed for other (mild pain) 100 tablet 0     carvedilol (COREG) 12.5 MG tablet Take 1 tablet (12.5 mg) by mouth 2 times daily (with meals) 180 tablet 3     Ferrous Gluconate 240 (27 Fe) MG TABS Take 1 tablet by mouth daily        hydrALAZINE (APRESOLINE) 100 MG tablet Take 1 tablet (100 mg) by mouth 3 times daily 270 tablet 3     isosorbide mononitrate (IMDUR) 30 MG 24 hr tablet Take 2 tablets (60 mg) by mouth daily 180 tablet 3     magnesium oxide (MAG-OX) 400 MG tablet Take 1 tablet (400 mg) by mouth 2 times daily 60 tablet 0       ALLERGIES   No Known Allergies    PAST MEDICAL HISTORY:  Past Medical History:   Diagnosis Date     Anemia, unspecified type 7/2/2019     Benign essential hypertension 11/5/2019     Bladder outflow obstruction 7/2/2019     Cardiomyopathy, unspecified type (H) 7/2/2019     CKD (chronic kidney disease) stage 4, GFR 15-29 ml/min (H) 7/2/2019     Hyperlipidemia LDL goal <70 8/25/2019     Tobacco abuse        PAST SURGICAL HISTORY:  Past Surgical History:   Procedure Laterality Date     HERNIORRHAPHY INGUINAL Left 12/19/2019    Procedure: OPEN REPAIR LEFT INGUINAL HERNIA WITH MESH;  Surgeon: Rao Schmidt MD;  Location: [facility] OR     LASER KTP GREEN LIGHT PHOTOSELECTIVE VAPORIZATION PROSTATE N/A 11/21/2019    Procedure:  GREEN LIGHT LASER VAPORIZATION OF THE PROSTATE;  Surgeon: Lencho Germain MD;  Location:  OR       FAMILY HISTORY:  Family History   Problem Relation Age of Onset     Glaucoma Mother      Throat cancer Father      Rheumatoid Arthritis Sister      Alcoholism Brother      Liver Disease Brother        SOCIAL HISTORY:  Social History     Socioeconomic History     Marital status:      Spouse name: None     Number of children: 1     Years of education: None     Highest education level: None   Occupational History     Comment: tech support (IT)   Social Needs     Financial resource strain: None     Food insecurity     Worry: None     Inability: None     Transportation needs     Medical: None     Non-medical: None   Tobacco Use     Smoking status: Former Smoker     Packs/day: 0.50     Years: 30.00     Pack years: 15.00     Types: Cigarettes     Quit date: 2019     Years since quittin.8     Smokeless tobacco: Never Used     Tobacco comment: 12 cigarettes per day   Substance and Sexual Activity     Alcohol use: Not Currently     Alcohol/week: 0.0 standard drinks     Comment: quit in 2018.      Drug use: Never     Sexual activity: Yes     Partners: Female   Lifestyle     Physical activity     Days per week: None     Minutes per session: None     Stress: None   Relationships     Social connections     Talks on phone: None     Gets together: None     Attends [employer] service: None     Active member of club or organization: None     Attends meetings of clubs or organizations: None     Relationship status: None     Intimate partner violence     Fear of current or ex partner: None     Emotionally abused: None     Physically abused: None     Forced sexual activity: None   Other Topics Concern     Parent/sibling w/ CABG, MI or angioplasty before 65F 55M? Not Asked   Social History Narrative     None       Review of Systems:  Skin:  Negative       Eyes:  Positive for glasses    ENT:  Positive for   runny  nose  Respiratory:  Negative       Cardiovascular:  Negative;chest pain;palpitations;dizziness;syncope or near-syncope;cyanosis;exercise intolerance;lightheadedness;fatigue;edema      Gastroenterology: Negative      Genitourinary:  Negative      Musculoskeletal:  Negative      Neurologic:  Negative      Psychiatric:  Negative      Heme/Lymph/Imm:  Negative      Endocrine:  Negative        Physical Exam:  Vitals: BP (!) 149/89 (BP Location: Left arm, Cuff Size: Adult Large)   Pulse 62   Ht 1.829 m (6')   Wt 125.6 kg (276 lb 14.4 oz)   BMI 37.55 kg/m      General patient appears comfortable  Neck normal JVP  Cardiovascular some S1-S2 normal no murmur rub or gallop next respite system clear to auscultation  Abdomen soft, obese  Extremities no edema  Neurological alert, oriented    Thank you for allowing me to participate in the care of your patient.      Sincerely,     Brandon Adam MD     [facility]    cc:   No referring provider defined for this encounter.

## 2021-04-02 ENCOUNTER — IMMUNIZATION (OUTPATIENT)
Dept: NURSING | Facility: CLINIC | Age: 66
End: 2021-04-02
Attending: INTERNAL MEDICINE
Payer: COMMERCIAL

## 2021-04-02 PROCEDURE — 0002A PR COVID VAC PFIZER DIL RECON 30 MCG/0.3 ML IM: CPT

## 2021-04-02 PROCEDURE — 91300 PR COVID VAC PFIZER DIL RECON 30 MCG/0.3 ML IM: CPT

## 2021-05-03 NOTE — PATIENT INSTRUCTIONS
Reduce Atorvastatin to 40mg tab, 1/2 tab daily for  Cholesterol   Either take iron with some lemonade/OJ or change to Vitron-C, 1 tab daily (OTC)  Fasting labs in 1 month. Be sure to drink water the day of the test   Return to work beginning August with 1/2 days for 1 week and then full time as previous. Will send paperwork to [employer]  See urology in early August as scheduled re: elevated PSA/bladder obstruction.  Continue Mayberry catheter for now  See nephrology in early August as scheduled  See cardiology in late September scheduled   Immediate Post OP Note    PreOp Diagnosis: 6 x 6cm Posterior scalp subcutaneous mass.      PostOp Diagnosis: Same.      Procedure(s):  EXCISION, MASS- 6X6 CM POSTERIOR SCALP. - Wound Class: Clean    Surgeon(s):  Sam Deshpande M.D.    Anesthesiologist/Type of Anesthesia:  Anesthesiologist: Chaim Pollard M.D./General    Surgical Staff:  Circulator: Stiven Walker R.N.; Ria Scott R.N.  Scrub Person: Abad Sotelo; Osiel Flores    Specimens removed if any:  ID Type Source Tests Collected by Time Destination   A : POSTERIOR SCALP SUBCUTANEOUS MASS Tissue Scalp PATHOLOGY SPECIMEN Sam Deshpande M.D. 5/3/2021  3:15 PM        Estimated Blood Loss: 10ml.    Findings: Subcutaneous mass.    Complications: None.    Dictated, #[id number].        5/3/2021 3:39 PM Sam Deshpande M.D.

## 2021-09-24 ENCOUNTER — OFFICE VISIT (OUTPATIENT)
Dept: INTERNAL MEDICINE | Facility: CLINIC | Age: 66
End: 2021-09-24
Payer: COMMERCIAL

## 2021-09-24 VITALS
BODY MASS INDEX: 37.36 KG/M2 | HEART RATE: 62 BPM | TEMPERATURE: 97.9 F | DIASTOLIC BLOOD PRESSURE: 70 MMHG | SYSTOLIC BLOOD PRESSURE: 130 MMHG | WEIGHT: 275.5 LBS | OXYGEN SATURATION: 98 % | RESPIRATION RATE: 18 BRPM

## 2021-09-24 DIAGNOSIS — E21.3 HYPERPARATHYROIDISM (H): ICD-10-CM

## 2021-09-24 DIAGNOSIS — R60.0 MILD PERIPHERAL EDEMA: Primary | ICD-10-CM

## 2021-09-24 DIAGNOSIS — N18.4 CKD (CHRONIC KIDNEY DISEASE) STAGE 4, GFR 15-29 ML/MIN (H): ICD-10-CM

## 2021-09-24 DIAGNOSIS — E78.5 HYPERLIPIDEMIA LDL GOAL <70: ICD-10-CM

## 2021-09-24 DIAGNOSIS — D64.9 ANEMIA, UNSPECIFIED TYPE: ICD-10-CM

## 2021-09-24 DIAGNOSIS — I10 BENIGN ESSENTIAL HYPERTENSION: ICD-10-CM

## 2021-09-24 DIAGNOSIS — I42.9 CARDIOMYOPATHY, UNSPECIFIED TYPE (H): ICD-10-CM

## 2021-09-24 DIAGNOSIS — E66.01 MORBID OBESITY (H): ICD-10-CM

## 2021-09-24 LAB
ALBUMIN SERPL-MCNC: 3.7 G/DL (ref 3.4–5)
ALP SERPL-CCNC: 89 U/L (ref 40–150)
ALT SERPL W P-5'-P-CCNC: 22 U/L (ref 0–70)
ANION GAP SERPL CALCULATED.3IONS-SCNC: 8 MMOL/L (ref 3–14)
AST SERPL W P-5'-P-CCNC: 16 U/L (ref 0–45)
BILIRUB SERPL-MCNC: 0.6 MG/DL (ref 0.2–1.3)
BUN SERPL-MCNC: 53 MG/DL (ref 7–30)
CALCIUM SERPL-MCNC: 8.5 MG/DL (ref 8.5–10.1)
CHLORIDE BLD-SCNC: 115 MMOL/L (ref 94–109)
CO2 SERPL-SCNC: 17 MMOL/L (ref 20–32)
CREAT SERPL-MCNC: 3.03 MG/DL (ref 0.66–1.25)
ERYTHROCYTE [DISTWIDTH] IN BLOOD BY AUTOMATED COUNT: 13.8 % (ref 10–15)
GFR SERPL CREATININE-BSD FRML MDRD: 21 ML/MIN/1.73M2
GLUCOSE BLD-MCNC: 104 MG/DL (ref 70–99)
HCT VFR BLD AUTO: 43 % (ref 40–53)
HGB BLD-MCNC: 13.9 G/DL (ref 13.3–17.7)
IRON SATN MFR SERPL: 25 % (ref 15–46)
IRON SERPL-MCNC: 71 UG/DL (ref 35–180)
MCH RBC QN AUTO: 31.4 PG (ref 26.5–33)
MCHC RBC AUTO-ENTMCNC: 32.3 G/DL (ref 31.5–36.5)
MCV RBC AUTO: 97 FL (ref 78–100)
PHOSPHATE SERPL-MCNC: 3.9 MG/DL (ref 2.5–4.5)
PLATELET # BLD AUTO: 221 10E3/UL (ref 150–450)
POTASSIUM BLD-SCNC: 4.6 MMOL/L (ref 3.4–5.3)
PROT SERPL-MCNC: 7.4 G/DL (ref 6.8–8.8)
PTH-INTACT SERPL-MCNC: 382 PG/ML (ref 18–80)
RBC # BLD AUTO: 4.42 10E6/UL (ref 4.4–5.9)
RETICS # AUTO: 0.09 10E6/UL (ref 0.03–0.1)
RETICS/RBC NFR AUTO: 2 % (ref 0.5–2)
SODIUM SERPL-SCNC: 140 MMOL/L (ref 133–144)
TIBC SERPL-MCNC: 285 UG/DL (ref 240–430)
VIT B12 SERPL-MCNC: 295 PG/ML (ref 193–986)
WBC # BLD AUTO: 8.3 10E3/UL (ref 4–11)

## 2021-09-24 PROCEDURE — 84100 ASSAY OF PHOSPHORUS: CPT | Performed by: PHYSICIAN ASSISTANT

## 2021-09-24 PROCEDURE — 82306 VITAMIN D 25 HYDROXY: CPT | Performed by: PHYSICIAN ASSISTANT

## 2021-09-24 PROCEDURE — 82607 VITAMIN B-12: CPT | Performed by: PHYSICIAN ASSISTANT

## 2021-09-24 PROCEDURE — 83550 IRON BINDING TEST: CPT | Performed by: PHYSICIAN ASSISTANT

## 2021-09-24 PROCEDURE — 83970 ASSAY OF PARATHORMONE: CPT | Performed by: PHYSICIAN ASSISTANT

## 2021-09-24 PROCEDURE — 85027 COMPLETE CBC AUTOMATED: CPT | Performed by: PHYSICIAN ASSISTANT

## 2021-09-24 PROCEDURE — 85045 AUTOMATED RETICULOCYTE COUNT: CPT | Performed by: PHYSICIAN ASSISTANT

## 2021-09-24 PROCEDURE — 80053 COMPREHEN METABOLIC PANEL: CPT | Performed by: PHYSICIAN ASSISTANT

## 2021-09-24 PROCEDURE — 99214 OFFICE O/P EST MOD 30 MIN: CPT | Performed by: PHYSICIAN ASSISTANT

## 2021-09-24 PROCEDURE — 36415 COLL VENOUS BLD VENIPUNCTURE: CPT | Performed by: PHYSICIAN ASSISTANT

## 2021-09-24 NOTE — PROGRESS NOTES
Assessment & Plan     Mild peripheral edema  Reviewed using compression stocking  Would avoid any diuretic given kidney disease     Morbid obesity (H)  Updated list, work on diet and exercise     Hyperparathyroidism (H)  Labs today per PCP    Hyperlipidemia LDL goal <70  Will need fasting labs  in the future     Cardiomyopathy, unspecified type (H)  Needs f/u with cardiology this fall     Benign essential hypertension  Controlled continue on current meds     CKD (chronic kidney disease) stage 4, GFR 15-29 ml/min (H)  Labs today     Anemia, unspecified type  Labs today                BMI:   Estimated body mass index is 37.36 kg/m  as calculated from the following:    Height as of 3/29/21: 1.829 m (6').    Weight as of this encounter: 125 kg (275 lb 8 oz).   Weight management plan: Patient was referred to their PCP to discuss a diet and exercise plan.        Return in about 2 weeks (around 10/8/2021) for Routine Visit, regular primary provider.    [facility]   Ras is a 66 year old who presents for the following health issues     HPI     Concern - Leg swelling   Onset: couple of months  Description: both legs  Intensity: moderate  Progression of Symptoms:  same  Accompanying Signs & Symptoms:   Previous history of similar problem:   Precipitating factors:        Worsened by:   Alleviating factors:        Improved by:   Therapies tried and outcome:         Review of Systems   Constitutional, HEENT, cardiovascular, pulmonary, gi and gu systems are negative, except as otherwise noted.      Objective    /70   Pulse 62   Temp 97.9  F (36.6  C) (Tympanic)   Resp 18   Wt 125 kg (275 lb 8 oz)   SpO2 98%   BMI 37.36 kg/m    Body mass index is 37.36 kg/m .  Physical Exam   GENERAL: healthy, alert and no distress  RESP: lungs clear to auscultation - no rales, rhonchi or wheezes  CV: regular rates and rhythm, normal S1 S2, no S3 or S4  and trace bilateral lower extremity edema  No pitting edema   MS: extremities normal- no gross deformities noted  SKIN: no suspicious lesions or rashes

## 2021-09-27 LAB — DEPRECATED CALCIDIOL+CALCIFEROL SERPL-MC: 13 UG/L (ref 20–75)

## 2021-10-09 ENCOUNTER — HEALTH MAINTENANCE LETTER (OUTPATIENT)
Age: 66
End: 2021-10-09

## 2022-03-26 ENCOUNTER — HEALTH MAINTENANCE LETTER (OUTPATIENT)
Age: 67
End: 2022-03-26

## 2022-07-11 ENCOUNTER — MYC MEDICAL ADVICE (OUTPATIENT)
Dept: INTERNAL MEDICINE | Facility: CLINIC | Age: 67
End: 2022-07-11

## 2022-07-25 DIAGNOSIS — I42.9 CARDIOMYOPATHY, UNSPECIFIED TYPE (H): ICD-10-CM

## 2022-07-25 RX ORDER — ISOSORBIDE MONONITRATE 30 MG/1
60 TABLET, EXTENDED RELEASE ORAL DAILY
Qty: 180 TABLET | Refills: 0 | Status: SHIPPED | OUTPATIENT
Start: 2022-07-25 | End: 2022-11-25

## 2022-07-25 RX ORDER — CARVEDILOL 12.5 MG/1
12.5 TABLET ORAL 2 TIMES DAILY WITH MEALS
Qty: 180 TABLET | Refills: 0 | Status: SHIPPED | OUTPATIENT
Start: 2022-07-25 | End: 2022-11-25

## 2022-07-25 RX ORDER — HYDRALAZINE HYDROCHLORIDE 100 MG/1
100 TABLET, FILM COATED ORAL 3 TIMES DAILY
Qty: 270 TABLET | Refills: 0 | Status: SHIPPED | OUTPATIENT
Start: 2022-07-25 | End: 2022-11-25

## 2022-07-25 NOTE — LETTER
July 25, 2022       TO: Ras Esparza  [address] [facility] [address]       Dear Ras Esparza,    We recently received a call from your pharmacy requesting a refill of your medication(s).    Our records indicate that you are due for follow-up with your Heart Care Provider. We will refill your medications for 3 months which will allow you enough time to be seen.    Please call [phone] to schedule your appointment.    Thank you for allowing [facility] to be a part of your health care team and we look forward to seeing you soon.    Thank you,    [facility]

## 2022-07-25 NOTE — TELEPHONE ENCOUNTER
Received refill request for:  Isosorbide, Hydralazine, Carvedilol  Last OV was: 3/29/21   Labs/EKG: N/a  F/U scheduled: No future appointments. Overdue orders from 9/2021. 2 letters sent. 3rd and final attempt, letter sent. 90 day Rx given, note to pharmacy that pt overdue for follow up.  Pharmacy: [facility] Refill Guideline reviewed.  Medication meets criteria for refill.    Amalia Miranda RN, BSN  07/25/22 at 8:23 AM

## 2022-09-11 ENCOUNTER — HEALTH MAINTENANCE LETTER (OUTPATIENT)
Age: 67
End: 2022-09-11

## 2022-11-25 DIAGNOSIS — I42.9 CARDIOMYOPATHY, UNSPECIFIED TYPE (H): ICD-10-CM

## 2022-11-25 RX ORDER — CARVEDILOL 12.5 MG/1
12.5 TABLET ORAL 2 TIMES DAILY WITH MEALS
Qty: 180 TABLET | Refills: 0 | Status: SHIPPED | OUTPATIENT
Start: 2022-11-25 | End: 2023-01-16

## 2022-11-25 RX ORDER — HYDRALAZINE HYDROCHLORIDE 100 MG/1
100 TABLET, FILM COATED ORAL 3 TIMES DAILY
Qty: 270 TABLET | Refills: 0 | Status: SHIPPED | OUTPATIENT
Start: 2022-11-25 | End: 2023-01-16

## 2022-11-25 RX ORDER — ISOSORBIDE MONONITRATE 30 MG/1
60 TABLET, EXTENDED RELEASE ORAL DAILY
Qty: 180 TABLET | Refills: 0 | Status: SHIPPED | OUTPATIENT
Start: 2022-11-25 | End: 2023-01-16

## 2023-01-04 DIAGNOSIS — I42.9 CARDIOMYOPATHY, UNSPECIFIED TYPE (H): Primary | ICD-10-CM

## 2023-01-16 ENCOUNTER — OFFICE VISIT (OUTPATIENT)
Dept: CARDIOLOGY | Facility: CLINIC | Age: 68
End: 2023-01-16
Payer: COMMERCIAL

## 2023-01-16 VITALS
HEIGHT: 72 IN | HEART RATE: 76 BPM | DIASTOLIC BLOOD PRESSURE: 78 MMHG | SYSTOLIC BLOOD PRESSURE: 128 MMHG | BODY MASS INDEX: 37.25 KG/M2 | WEIGHT: 275 LBS

## 2023-01-16 DIAGNOSIS — I42.9 CARDIOMYOPATHY, UNSPECIFIED TYPE (H): ICD-10-CM

## 2023-01-16 DIAGNOSIS — I42.8 OTHER CARDIOMYOPATHY (H): Primary | ICD-10-CM

## 2023-01-16 PROCEDURE — 99215 OFFICE O/P EST HI 40 MIN: CPT | Performed by: INTERNAL MEDICINE

## 2023-01-16 RX ORDER — CARVEDILOL 12.5 MG/1
12.5 TABLET ORAL 2 TIMES DAILY WITH MEALS
Qty: 180 TABLET | Refills: 3 | Status: SHIPPED | OUTPATIENT
Start: 2023-01-16 | End: 2024-02-28

## 2023-01-16 RX ORDER — ISOSORBIDE MONONITRATE 30 MG/1
60 TABLET, EXTENDED RELEASE ORAL DAILY
Qty: 180 TABLET | Refills: 3 | Status: SHIPPED | OUTPATIENT
Start: 2023-01-16 | End: 2024-02-28

## 2023-01-16 RX ORDER — HYDRALAZINE HYDROCHLORIDE 100 MG/1
100 TABLET, FILM COATED ORAL 3 TIMES DAILY
Qty: 270 TABLET | Refills: 3 | Status: SHIPPED | OUTPATIENT
Start: 2023-01-16 | End: 2024-02-28

## 2023-01-16 NOTE — PATIENT INSTRUCTIONS
Your blood pressure was elevated at your appointment today.  Elevated blood pressure can increase your risk of a heart attack, stroke and heart failure.  For this reason, we feel it is important to monitor your blood pressure closely.  If you have access to a home blood pressure monitor or are able to check your blood pressure at a local pharmacy in the next week we would like you to do so and call our clinic with those readings. Please call [phone] (Jennifer) and leave a message with your name, date of birth, blood pressure reading, date and location it was completed. If your blood pressure remains elevated your care team will be notified.  We appreciate being a part of your healthcare team and look forward to hearing from you soon.

## 2023-01-16 NOTE — LETTER
1/16/2023    Krzysztof Thakur MD  [address] [facility] [address]    RE: Rsa Esparza       Dear Colleague,     I had the pleasure of seeing Ras Esparza in the [facility].  HPI and Plan:   Mr. Esparza is a very pleasant 67-year-old gentleman who was admitted in 06/2019 with acute renal failure secondary to bladder outlet obstruction with bilateral hydronephrosis and hydroureter and was found to have newly diagnosed cardiomyopathy with reduced LV systolic function with LVEF around 30% with moderately dilated LV and global hypokinesia.  At that time, his creatinine was 9 and it slowly down trended and has eventually has improved to around 3.  He never had any anginal symptoms like exertional chest discomfort or tightness and underwent Lexiscan stress test that showed partially visible inferior and basal inferolateral defect suggestive of mild ischemia.  Subsequent echocardiogram showed improvement LV function to 46%.  Due to mild burden of ischemia, clinically no anginal symptoms and significantly elevated creatinine medical management for CAD was adopted.  Patient was supposed to follow-up in 6 months.  Today is coming to cardiology clinic I saw him last time in March 2021.  Patient has no specific health complaints.  Overall health wise he feels stable.  He does not do any particular dedicated exercise but with activities daily living he feels well.  Weights are stable.  No particular shortness of breath or chest discomfort dizziness presyncope syncope.  With aspirin he was having gastritis and he stopped taking aspirin he also stopped taking statin because of low cholesterol.  He is currently on carvedilol 12.5 mg twice daily, hydralazine 100 mg 3 times daily, isosorbide 60 mg daily.  He does not use any tobacco anymore.  No significant alcohol intake.    Assessment and plan  1.  History of cardiomyopathy with last echocardiogram in 2019 showing LVEF improved to 46%.  Clinically  appears euvolemic compensated.  Etiology of cardiomyopathy could be ischemic cardiomyopathy versus hypertensive cardiomyopathy.  2.  Possible CAD on the basis of abnormal nuclear stress test in the past.  Clinically no anginal symptoms.  Patient did not tolerate aspirin, and stopped taking statin because of low cholesterol  3.  Chronic kidney disease.  4.  Hypertension well-controlled    Recommendations  Continue current cardiac medications  Echocardiogram  Cardiac MRI with stress perfusion.  If there is evidence of ischemic cardiomyopathy will recommend patient reconsider decision about taking statin and low-dose aspirin.  Fasting lipid panel  I recommend patient follows his primary care physician for routine health care and chronic kidney disease.  Follow-up in a month following above test.          The level of medical decision making during this visit was of high complexity.      Orders Placed This Encounter   Procedures     Lipid Profile     ALT     Follow-Up with Cardiology [facility]       Orders Placed This Encounter   Medications     carvedilol (COREG) 12.5 MG tablet     Sig: Take 1 tablet (12.5 mg) by mouth 2 times daily (with meals)     Dispense:  180 tablet     Refill:  3     isosorbide mononitrate (IMDUR) 30 MG 24 hr tablet     Sig: Take 2 tablets (60 mg) by mouth daily     Dispense:  180 tablet     Refill:  3     hydrALAZINE (APRESOLINE) 100 MG tablet     Sig: Take 1 tablet (100 mg) by mouth 3 times daily     Dispense:  270 tablet     Refill:  3       Medications Discontinued During This Encounter   Medication Reason     acetaminophen (TYLENOL) 325 MG tablet Stopped by Patient     isosorbide mononitrate (IMDUR) 30 MG 24 hr tablet Reorder     carvedilol (COREG) 12.5 MG tablet Reorder     hydrALAZINE (APRESOLINE) 100 MG tablet Reorder         Encounter Diagnoses   Name Primary?     Other cardiomyopathy (H) Yes     Cardiomyopathy, unspecified type (H)        CURRENT MEDICATIONS:  Current Outpatient  Medications   Medication Sig Dispense Refill     carvedilol (COREG) 12.5 MG tablet Take 1 tablet (12.5 mg) by mouth 2 times daily (with meals) 180 tablet 3     Ferrous Gluconate 240 (27 Fe) MG TABS Take 1 tablet by mouth daily        hydrALAZINE (APRESOLINE) 100 MG tablet Take 1 tablet (100 mg) by mouth 3 times daily 270 tablet 3     isosorbide mononitrate (IMDUR) 30 MG 24 hr tablet Take 2 tablets (60 mg) by mouth daily 180 tablet 3     magnesium oxide (MAG-OX) 400 MG tablet Take 1 tablet (400 mg) by mouth 2 times daily 60 tablet 0       ALLERGIES   No Known Allergies    PAST MEDICAL HISTORY:  Past Medical History:   Diagnosis Date     Anemia, unspecified type 7/2/2019     Benign essential hypertension 11/5/2019     Bladder outflow obstruction 7/2/2019     Cardiomyopathy, unspecified type (H) 7/2/2019     CKD (chronic kidney disease) stage 4, GFR 15-29 ml/min (H) 7/2/2019     Hyperlipidemia LDL goal <70 8/25/2019     Tobacco abuse        PAST SURGICAL HISTORY:  Past Surgical History:   Procedure Laterality Date     HERNIORRHAPHY INGUINAL Left 12/19/2019    Procedure: OPEN REPAIR LEFT INGUINAL HERNIA WITH MESH;  Surgeon: Rao Schmidt MD;  Location:  OR     LASER KTP GREEN LIGHT PHOTOSELECTIVE VAPORIZATION PROSTATE N/A 11/21/2019    Procedure: GREEN LIGHT LASER VAPORIZATION OF THE PROSTATE;  Surgeon: Lencho Germain MD;  Location:  OR       FAMILY HISTORY:  Family History   Problem Relation Age of Onset     Glaucoma Mother      Throat cancer Father      Rheumatoid Arthritis Sister      Alcoholism Brother      Liver Disease Brother        SOCIAL HISTORY:  Social History     Socioeconomic History     Marital status:      Spouse name: None     Number of children: 1     Years of education: None     Highest education level: None   Occupational History     Comment: tech support (IT)   Tobacco Use     Smoking status: Former     Packs/day: 0.50     Years: 30.00     Pack years: 15.00     Types:  Cigarettes     Quit date: 5/20/2019     Years since quitting: 3.6     Smokeless tobacco: Never     Tobacco comments:     12 cigarettes per day   Substance and Sexual Activity     Alcohol use: Not Currently     Alcohol/week: 0.0 standard drinks     Comment: quit in 2018.      Drug use: Never     Sexual activity: Yes     Partners: Female       Review of Systems:  Skin:  not assessed       Eyes:  not assessed      ENT:  not assessed      Respiratory:  Negative       Cardiovascular:  Negative      Gastroenterology: not assessed      Genitourinary:  not assessed      Musculoskeletal:  not assessed      Neurologic:  not assessed      Psychiatric:  not assessed      Heme/Lymph/Imm:  Negative      Endocrine:  Negative        Physical Exam:  Vitals: /78 (BP Location: Right arm, Cuff Size: Adult Large)   Pulse 76   Ht 1.829 m (6')   Wt 124.7 kg (275 lb)   BMI 37.30 kg/m      General patient appears comfortable  Neck normal JVP, negative hepatojugular reflux  Cardiovascular system S1-S2 normal no murmur rub or gallop  Respiratory system clear to auscultation  Extremities minimal pitting pedal edema  Neurological alert, oriented    CC  Referred Self,     Thank you for allowing me to participate in the care of your patient.      Sincerely,     Brandon Adam MD     [facility]

## 2023-01-16 NOTE — PROGRESS NOTES
HPI and Plan:   Mr. Esparza is a very pleasant 67-year-old gentleman who was admitted in 06/2019 with acute renal failure secondary to bladder outlet obstruction with bilateral hydronephrosis and hydroureter and was found to have newly diagnosed cardiomyopathy with reduced LV systolic function with LVEF around 30% with moderately dilated LV and global hypokinesia.  At that time, his creatinine was 9 and it slowly down trended and has eventually has improved to around 3.  He never had any anginal symptoms like exertional chest discomfort or tightness and underwent Lexiscan stress test that showed partially visible inferior and basal inferolateral defect suggestive of mild ischemia.  Subsequent echocardiogram showed improvement LV function to 46%.  Due to mild burden of ischemia, clinically no anginal symptoms and significantly elevated creatinine medical management for CAD was adopted.  Patient was supposed to follow-up in 6 months.  Today is coming to cardiology clinic I saw him last time in March 2021.  Patient has no specific health complaints.  Overall health wise he feels stable.  He does not do any particular dedicated exercise but with activities daily living he feels well.  Weights are stable.  No particular shortness of breath or chest discomfort dizziness presyncope syncope.  With aspirin he was having gastritis and he stopped taking aspirin he also stopped taking statin because of low cholesterol.  He is currently on carvedilol 12.5 mg twice daily, hydralazine 100 mg 3 times daily, isosorbide 60 mg daily.  He does not use any tobacco anymore.  No significant alcohol intake.    Assessment and plan  1.  History of cardiomyopathy with last echocardiogram in 2019 showing LVEF improved to 46%.  Clinically appears euvolemic compensated.  Etiology of cardiomyopathy could be ischemic cardiomyopathy versus hypertensive cardiomyopathy.  2.  Possible CAD on the basis of abnormal nuclear stress test in the past.   Clinically no anginal symptoms.  Patient did not tolerate aspirin, and stopped taking statin because of low cholesterol  3.  Chronic kidney disease.  4.  Hypertension well-controlled    Recommendations  Continue current cardiac medications  Echocardiogram  Cardiac MRI with stress perfusion.  If there is evidence of ischemic cardiomyopathy will recommend patient reconsider decision about taking statin and low-dose aspirin.  Fasting lipid panel  I recommend patient follows his primary care physician for routine health care and chronic kidney disease.  Follow-up in a month following above test.          The level of medical decision making during this visit was of high complexity.      Orders Placed This Encounter   Procedures     Lipid Profile     ALT     Follow-Up with Cardiology Core- [facility]       Orders Placed This Encounter   Medications     carvedilol (COREG) 12.5 MG tablet     Sig: Take 1 tablet (12.5 mg) by mouth 2 times daily (with meals)     Dispense:  180 tablet     Refill:  3     isosorbide mononitrate (IMDUR) 30 MG 24 hr tablet     Sig: Take 2 tablets (60 mg) by mouth daily     Dispense:  180 tablet     Refill:  3     hydrALAZINE (APRESOLINE) 100 MG tablet     Sig: Take 1 tablet (100 mg) by mouth 3 times daily     Dispense:  270 tablet     Refill:  3       Medications Discontinued During This Encounter   Medication Reason     acetaminophen (TYLENOL) 325 MG tablet Stopped by Patient     isosorbide mononitrate (IMDUR) 30 MG 24 hr tablet Reorder     carvedilol (COREG) 12.5 MG tablet Reorder     hydrALAZINE (APRESOLINE) 100 MG tablet Reorder         Encounter Diagnoses   Name Primary?     Other cardiomyopathy (H) Yes     Cardiomyopathy, unspecified type (H)        CURRENT MEDICATIONS:  Current Outpatient Medications   Medication Sig Dispense Refill     carvedilol (COREG) 12.5 MG tablet Take 1 tablet (12.5 mg) by mouth 2 times daily (with meals) 180 tablet 3     Ferrous Gluconate 240 (27 Fe) MG TABS Take 1 tablet  by mouth daily        hydrALAZINE (APRESOLINE) 100 MG tablet Take 1 tablet (100 mg) by mouth 3 times daily 270 tablet 3     isosorbide mononitrate (IMDUR) 30 MG 24 hr tablet Take 2 tablets (60 mg) by mouth daily 180 tablet 3     magnesium oxide (MAG-OX) 400 MG tablet Take 1 tablet (400 mg) by mouth 2 times daily 60 tablet 0       ALLERGIES   No Known Allergies    PAST MEDICAL HISTORY:  Past Medical History:   Diagnosis Date     Anemia, unspecified type 7/2/2019     Benign essential hypertension 11/5/2019     Bladder outflow obstruction 7/2/2019     Cardiomyopathy, unspecified type (H) 7/2/2019     CKD (chronic kidney disease) stage 4, GFR 15-29 ml/min (H) 7/2/2019     Hyperlipidemia LDL goal <70 8/25/2019     Tobacco abuse        PAST SURGICAL HISTORY:  Past Surgical History:   Procedure Laterality Date     HERNIORRHAPHY INGUINAL Left 12/19/2019    Procedure: OPEN REPAIR LEFT INGUINAL HERNIA WITH MESH;  Surgeon: aRo Schmidt MD;  Location:  OR     LASER KTP GREEN LIGHT PHOTOSELECTIVE VAPORIZATION PROSTATE N/A 11/21/2019    Procedure: GREEN LIGHT LASER VAPORIZATION OF THE PROSTATE;  Surgeon: Lencho Germain MD;  Location:  OR       FAMILY HISTORY:  Family History   Problem Relation Age of Onset     Glaucoma Mother      Throat cancer Father      Rheumatoid Arthritis Sister      Alcoholism Brother      Liver Disease Brother        SOCIAL HISTORY:  Social History     Socioeconomic History     Marital status:      Spouse name: None     Number of children: 1     Years of education: None     Highest education level: None   Occupational History     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      Drug use: Never     Sexual activity: Yes     Partners: Female       Review of Systems:  Skin:  not assessed       Eyes:  not assessed      ENT:  not assessed      Respiratory:  Negative       Cardiovascular:  Negative      Gastroenterology: not assessed      Genitourinary:  not assessed      Musculoskeletal:  not assessed      Neurologic:  not assessed      Psychiatric:  not assessed      Heme/Lymph/Imm:  Negative      Endocrine:  Negative        Physical Exam:  Vitals: /78 (BP Location: Right arm, Cuff Size: Adult Large)   Pulse 76   Ht 1.829 m (6')   Wt 124.7 kg (275 lb)   BMI 37.30 kg/m      General patient appears comfortable  Neck normal JVP, negative hepatojugular reflux  Cardiovascular system S1-S2 normal no murmur rub or gallop  Respiratory system clear to auscultation  Extremities minimal pitting pedal edema  Neurological alert, oriented    CC  Referred Self, MD  No address on file

## 2023-01-24 ENCOUNTER — HOSPITAL ENCOUNTER (OUTPATIENT)
Dept: CARDIOLOGY | Facility: CLINIC | Age: 68
Discharge: HOME | End: 2023-01-24
Attending: INTERNAL MEDICINE | Admitting: INTERNAL MEDICINE
Payer: COMMERCIAL

## 2023-01-24 DIAGNOSIS — I42.9 CARDIOMYOPATHY, UNSPECIFIED TYPE (H): ICD-10-CM

## 2023-01-24 LAB — LVEF ECHO: NORMAL

## 2023-01-24 PROCEDURE — 93306 TTE W/DOPPLER COMPLETE: CPT

## 2023-01-24 PROCEDURE — 93306 TTE W/DOPPLER COMPLETE: CPT | Mod: 26 | Performed by: INTERNAL MEDICINE

## 2023-02-23 ENCOUNTER — TELEPHONE (OUTPATIENT)
Dept: CARDIOLOGY | Facility: CLINIC | Age: 68
End: 2023-02-23

## 2023-02-23 NOTE — TELEPHONE ENCOUNTER
I phoned paint to inform him that I checked with our MRI dept regarding his concerns over his CKD and use of contrast for a 3/23 c-MRI stress.    I told him that I spoke with the RN's in MRI and was told that if he is not receiving dialysis ( he is NOT) then he can safely have the MRI being that they use an agent that is more kidney friendly and metabolized differently than what is used for a CT.    Patient expressed comfort with this information and is agreeable in moving forward with the MRI.

## 2023-02-23 NOTE — TELEPHONE ENCOUNTER
[facility] Call Center    Phone Message    May a detailed message be left on voicemail: yes     Reason for Call: Other:     Pt is requesting a call back to discuss the type of stress test he is scheduled for.  He stated he is concerned about the contrast used and having kidney disease.    Action Taken: Other: cardio    Travel Screening: Not Applicable     Thank you!  [facility]

## 2023-03-21 ENCOUNTER — LAB (OUTPATIENT)
Dept: LAB | Facility: CLINIC | Age: 68
End: 2023-03-21
Payer: COMMERCIAL

## 2023-03-21 DIAGNOSIS — I42.8 OTHER CARDIOMYOPATHY (H): ICD-10-CM

## 2023-03-21 LAB
ALT SERPL W P-5'-P-CCNC: 16 U/L (ref 10–50)
CHOLEST SERPL-MCNC: 159 MG/DL
HDLC SERPL-MCNC: 33 MG/DL
LDLC SERPL CALC-MCNC: 91 MG/DL
NONHDLC SERPL-MCNC: 126 MG/DL
TRIGL SERPL-MCNC: 177 MG/DL

## 2023-03-21 PROCEDURE — 80061 LIPID PANEL: CPT

## 2023-03-21 PROCEDURE — 84460 ALANINE AMINO (ALT) (SGPT): CPT

## 2023-03-21 PROCEDURE — 36415 COLL VENOUS BLD VENIPUNCTURE: CPT

## 2023-03-23 ENCOUNTER — HOSPITAL ENCOUNTER (OUTPATIENT)
Dept: CARDIOLOGY | Facility: CLINIC | Age: 68
Discharge: HOME | End: 2023-03-23
Attending: INTERNAL MEDICINE | Admitting: INTERNAL MEDICINE
Payer: COMMERCIAL

## 2023-03-23 DIAGNOSIS — I42.8 OTHER CARDIOMYOPATHY (H): ICD-10-CM

## 2023-03-23 PROCEDURE — A9585 GADOBUTROL INJECTION: HCPCS | Performed by: INTERNAL MEDICINE

## 2023-03-23 PROCEDURE — 93017 CV STRESS TEST TRACING ONLY: CPT

## 2023-03-23 PROCEDURE — 93016 CV STRESS TEST SUPVJ ONLY: CPT | Performed by: INTERNAL MEDICINE

## 2023-03-23 PROCEDURE — 75563 CARD MRI W/STRESS IMG & DYE: CPT | Mod: 26 | Performed by: INTERNAL MEDICINE

## 2023-03-23 PROCEDURE — 93018 CV STRESS TEST I&R ONLY: CPT | Performed by: INTERNAL MEDICINE

## 2023-03-23 PROCEDURE — 255N000002 HC RX 255 OP 636: Performed by: INTERNAL MEDICINE

## 2023-03-23 RX ORDER — ONDANSETRON 2 MG/ML
4 INJECTION INTRAMUSCULAR; INTRAVENOUS
Status: DISCONTINUED | OUTPATIENT
Start: 2023-03-23 | End: 2023-03-24 | Stop reason: HOSPADM

## 2023-03-23 RX ORDER — DIAZEPAM 5 MG
5 TABLET ORAL EVERY 30 MIN PRN
Status: DISCONTINUED | OUTPATIENT
Start: 2023-03-23 | End: 2023-03-24 | Stop reason: HOSPADM

## 2023-03-23 RX ORDER — AMINOPHYLLINE 25 MG/ML
100 INJECTION, SOLUTION INTRAVENOUS ONCE
Status: DISCONTINUED | OUTPATIENT
Start: 2023-03-23 | End: 2023-03-24 | Stop reason: HOSPADM

## 2023-03-23 RX ORDER — REGADENOSON 0.08 MG/ML
0.4 INJECTION, SOLUTION INTRAVENOUS ONCE
Status: DISCONTINUED | OUTPATIENT
Start: 2023-03-23 | End: 2023-03-24 | Stop reason: HOSPADM

## 2023-03-23 RX ORDER — CAFFEINE CITRATE 20 MG/ML
60 SOLUTION INTRAVENOUS
Status: DISCONTINUED | OUTPATIENT
Start: 2023-03-23 | End: 2023-03-24 | Stop reason: HOSPADM

## 2023-03-23 RX ORDER — DIPHENHYDRAMINE HYDROCHLORIDE 50 MG/ML
25-50 INJECTION INTRAMUSCULAR; INTRAVENOUS
Status: DISCONTINUED | OUTPATIENT
Start: 2023-03-23 | End: 2023-03-24 | Stop reason: HOSPADM

## 2023-03-23 RX ORDER — DIPHENHYDRAMINE HCL 25 MG
25 CAPSULE ORAL
Status: DISCONTINUED | OUTPATIENT
Start: 2023-03-23 | End: 2023-03-24 | Stop reason: HOSPADM

## 2023-03-23 RX ORDER — ALBUTEROL SULFATE 90 UG/1
2 AEROSOL, METERED RESPIRATORY (INHALATION) EVERY 5 MIN PRN
Status: DISCONTINUED | OUTPATIENT
Start: 2023-03-23 | End: 2023-03-24 | Stop reason: HOSPADM

## 2023-03-23 RX ORDER — GADOBUTROL 604.72 MG/ML
30 INJECTION INTRAVENOUS ONCE
Status: COMPLETED | OUTPATIENT
Start: 2023-03-23 | End: 2023-03-23

## 2023-03-23 RX ORDER — METHYLPREDNISOLONE SODIUM SUCCINATE 125 MG/2ML
125 INJECTION, POWDER, LYOPHILIZED, FOR SOLUTION INTRAMUSCULAR; INTRAVENOUS
Status: DISCONTINUED | OUTPATIENT
Start: 2023-03-23 | End: 2023-03-24 | Stop reason: HOSPADM

## 2023-03-23 RX ORDER — ACYCLOVIR 200 MG/1
0-1 CAPSULE ORAL
Status: DISCONTINUED | OUTPATIENT
Start: 2023-03-23 | End: 2023-03-24 | Stop reason: HOSPADM

## 2023-03-23 RX ADMIN — GADOBUTROL 30 ML: 604.72 INJECTION INTRAVENOUS at 11:14

## 2023-03-23 NOTE — PROGRESS NOTES
Joe Real MD Gaustad, Kristine; Marie Gallagher  MRI Cardiac Stress protocol   T2 star   Contrast administered per weight based protocol.

## 2023-03-27 NOTE — PROGRESS NOTES
Cardiology Clinic Progress Note  Ras Esparza MRN# [id number]   YOB: 1955 Age: 68 year old   Primary Cardiologist: Dr. Adam Reason for visit: Cardiology follow up            Assessment and Plan:   Ras Esparza is a very pleasant 68 year old male here for CORE follow up.     1.  Chronic systolic heart failure/HFrEF - improved, LVEF 66% on cardiac MRI 3/2023, EF as low as 30% in October 2019              - NYHA class II, stage C              - Etiology : unknown, ? hypertensive              - Fluid status : euvolemic               - Goal weight: ~ 275#              - Diuretic regimen : none              - Last RHC : none              - Ischemic evaluation : cardiac MRI 3/2023 negative for ischemia              - Guideline directed medical therapy                          - Betablocker: carvedilol 12.5mg BID                          - ACEI/ARB/ARNI: none r/t LEIGHA/CKD                          - Aldactone antagonist: none r/t LEIGHA/CKD                          - Isosorbide mononitrate 60mg daily                          - Hydralazine 100mg TID  2. Chronic renal failure - secondary to bladder outlet obstruction with bilateral hydronephrosis and hydroureter, his initial creatinine was 9.59. Creatinine improved to baseline ~ 3, S/p TURP.               - Reinforced importance of continued monitoring with PCP and/or nephrology  3. Hypertension - controlled  4. Former tobacco use - quit tobacco use February 2019  5. Hyperlipidemia - controlled in setting of no known CAD, patient wishes to remain of statin therapy, LDL 91 3/2023.   6. Mild dilation of ascending aorta and aortic root on echo/MRI - 4.1cm  7. Obesity     I saw patient today for cardiology follow-up after cardiac testing. Echo 1/24/23 showed further recovery in his LV function, LVEF 50-55%, ascending aorta 4.1cm, mild to moderate LVH, no significant valvular disease. Cardiac MRI 3/23/23 was negative for ischemic, normal biventricular function,  LVEF 66% and RVEF 73% and delayed hyperenhancement reveals small area of mid inferior/mid inferolateral non CAD mid myocardial scar. Reviewed results and answered questions. Lipid panel from 3/2023 showed total cholesterol 159, HDL 33, LDL 91 and triglycerides 177. He continues to feel well with no cardiac symptoms, is compensated/euvolemic on exam. Okay to remain of statin and aspirin therapy given no known CAD and patient preference to remain off statin and aspirin. Recommended follow up with PCP and/or nephrology for continued monitoring of his renal disease. No medication changes today.     Changes today: none    Follow up plan:     Cardiology follow up with Dr. Adam in 1 year        History of Presenting Illness:    Ras Esparza is a very pleasant 68 year old male with a history of hypertension and tobacco use, who has not sought out routine care, recently hospitalization where he was diagnosed with HFrEF (likely ischemic), hypertension, acute kidney injury secondary to bladder outlet obstruction with bilateral hydronephrosis and hydroureter and anemia.      Patient hospitalized 6/21/19-6/25/19 related to acute kidney injury secondary to bladder outlet obstruction with bilateral hydronephrosis and hydroureter, his initial creatinine was 9.59. Creatinine improved to 4.73 6/28/19 without needing dialysis. NTproBNP 45,492. Complaints of worsening exertional dyspnea, lower extremity edema and one episode of typical chest pain/diaphoresis lasting 2hrs approx 1-2 months ago. Echocardiogram shows EF 30% with severe global hypokinesis, LV moderately dilated, RV normal size/function, no significant valvular disease. Etiology of cardiomyopathy unknown at this time, ischemic cardiomyopathy not excluded. Coronary angiogram not pursued during hospital stay due to LEIGHA. Patient started on carvedilol, hydralazine and imdur. No ACEI/ARB or aldactone antagonist due to renal failure. Vascular calcifications noted on CT of  abdomen/pelvis- started on statin therapy. Admission weight 256# Discharge weight 237#. Patient was discharged with milligan catheter and has been following with urology, underwent TURP on 11/21/19.     Nuclear stress test 10/3/2019 demonstrated partially reversible inferior and basal inferolateral defect. Small, reversible basal inferolateral defect suggests mild ischemia in the circumflex distribution. Medically managed given mild ischemia, no anginal symptoms and significant elevation in creatinine. Echocardiogram 10/15/2019 showed improvement in LV function with an EF of 46%, RV normal size and function, no significant valvular disease, mild aortic root dilatation (stable). Event monitor showed no arrhythmias.     Patient has done well from a cardiac standpoint with no hospitalizations and overall well controlled heart failure. He most recently saw Dr. Adam in January at which time he was doing well. Dr. Adam recommended echocardiogram and cardiac stress MRI.     Echo 1/24/23 showed further recovery in his LV function, LVEF 50-55%, ascending aorta 4.1cm, mild to moderate LVH, no significant valvular disease.     Cardiac MRI 3/23/23 was negative for ischemic, normal biventricular function, LVEF 66% and RVER 73% and delayed hyperenhancement reveals small area of mid inferior/mid inferolateral non CAD mid myocardial scar.     Patient is here today for CORE follow up.     Patient reports feeling good. Not monitoring weight at home. Denies shortness of breath at rest. Some exertional dyspnea with strenuous activity, notes he is able to use push  for ~ 1 hour. Denies orthopnea or PND. Some chronic left LE edema, which he states has improved. Denies chest pain or chest tightness. Denies dizziness, lightheadedness or other presyncopal symptoms. Denies tachycardia or palpitations. Taking medications daily.     Lipid panel from 3/21/23 showed total cholesterol 159, HDL 33, LDL 91 and triglycerides 177. Blood  pressure 116/72 and HR 68 in clinic today.    Appetite good, eating most meals at home. Trying to limit sodium intake. No set exercise routine, getting activities with ADLs and trying to go on short walks. Had plans to join health club. Denies alcohol use. Quit tobacco use February 2019. Works in IT.         Social History    , 1 daughter, on disability. Enjoys fishing. Enjoy the outdoors.   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      Drug use: Never     Sexual activity: Yes     Partners: Female   Other Topics Concern     Parent/sibling w/ CABG, MI or angioplasty before 65F 55M? Not Asked   Social History Narrative     Not on file     Social Determinants of Health     Financial Resource Strain: Not on file   Food Insecurity: Not on file   Transportation Needs: Not on file   Physical Activity: Not on file   Stress: Not on file   Social Connections: Not on file   Intimate Partner Violence: Not on file   Housing Stability: Not on file          Review of Systems:   10 point ROS neg other than the symptoms noted above in the HPI.         Physical Exam:   Vitals: There were no vitals taken for this visit.   Wt Readings from Last 4 Encounters:   01/16/23 124.7 kg (275 lb)   09/24/21 125 kg (275 lb 8 oz)   03/29/21 125.6 kg (276 lb 14.4 oz)   01/16/20 108.6 kg (239 lb 6.4 oz)     GEN: well nourished, in no acute distress.  HEENT:   Pupils equal, round. Sclerae nonicteric.   NECK: Supple, no masses appreciated. JVP appears normal.   C/V:  Regular rate and rhythm, no murmur, rub or gallop.    RESP: Respirations are unlabored. Clear to auscultation bilaterally without wheezing, rales, or rhonchi.  GI: Abdomen soft, round, nontender.  EXTREM: No LE edema.  NEURO: Alert and oriented, cooperative.  SKIN: Warm and dry       Data:     LIPID RESULTS:  Lab Results   Component Value Date    CHOL 159 03/21/2023    CHOL 112 01/16/2020    HDL 33 (L) 03/21/2023    HDL 41 01/16/2020    LDL 91 03/21/2023    LDL 58 01/16/2020    TRIG 177 (H) 03/21/2023    TRIG 67 01/16/2020     LIVER ENZYME RESULTS:  Lab Results   Component Value Date    AST 16 09/24/2021    AST 15 06/21/2019    ALT 16 03/21/2023    ALT 38 06/21/2019     CBC RESULTS:  Lab Results   Component Value Date    WBC 8.3 09/24/2021    WBC 8.2 10/31/2019    RBC 4.42 09/24/2021    RBC 3.94 (L) 10/31/2019    HGB 13.9 09/24/2021    HGB 12.1 (L) 11/22/2019    HCT 43.0 09/24/2021    HCT 37.7 (L) 10/31/2019    MCV 97 09/24/2021    MCV 96 10/31/2019    MCH 31.4 09/24/2021    MCH 31.2 10/31/2019    MCHC 32.3 09/24/2021    MCHC 32.6 10/31/2019    RDW 13.8 09/24/2021    RDW 13.8 10/31/2019     09/24/2021     10/31/2019     BMP RESULTS:  Lab Results   Component Value Date     09/24/2021     07/10/2020    POTASSIUM 4.6 09/24/2021    POTASSIUM 4.7 07/10/2020    CHLORIDE 115 (H) 09/24/2021    CHLORIDE 115 (H) 07/10/2020    CO2 17 (L) 09/24/2021    CO2 17 (L) 07/10/2020    ANIONGAP 8 09/24/2021    ANIONGAP 7 07/10/2020     (H) 09/24/2021    GLC 87 07/10/2020    BUN 53 (H) 09/24/2021    BUN 43 (H) 07/10/2020    CR 3.03 (H) 09/24/2021    CR 2.78 (H) 07/10/2020    GFRESTIMATED 21 (L) 09/24/2021    GFRESTIMATED 23 (L) 07/10/2020    GFRESTBLACK 27 (L) 07/10/2020    ELICIA 8.5 09/24/2021    ELICIA 8.6 07/10/2020           Medications     Current Outpatient Medications   Medication Sig Dispense  Refill     carvedilol (COREG) 12.5 MG tablet Take 1 tablet (12.5 mg) by mouth 2 times daily (with meals) 180 tablet 3     Ferrous Gluconate 240 (27 Fe) MG TABS Take 1 tablet by mouth daily        hydrALAZINE (APRESOLINE) 100 MG tablet Take 1 tablet (100 mg) by mouth 3 times daily 270 tablet 3     isosorbide mononitrate (IMDUR) 30 MG 24 hr tablet Take 2 tablets (60 mg) by mouth daily 180 tablet 3     magnesium oxide (MAG-OX) 400 MG tablet Take 1 tablet (400 mg) by mouth 2 times daily 60 tablet 0        Past Medical History     Past Medical History:   Diagnosis Date     Anemia, unspecified type 7/2/2019     Benign essential hypertension 11/5/2019     Bladder outflow obstruction 7/2/2019     Cardiomyopathy, unspecified type (H) 7/2/2019     CKD (chronic kidney disease) stage 4, GFR 15-29 ml/min (H) 7/2/2019     Hyperlipidemia LDL goal <70 8/25/2019     Tobacco abuse      Past Surgical History:   Procedure Laterality Date     HERNIORRHAPHY INGUINAL Left 12/19/2019    Procedure: OPEN REPAIR LEFT INGUINAL HERNIA WITH MESH;  Surgeon: Rao Schmidt MD;  Location:  OR     LASER KTP GREEN LIGHT PHOTOSELECTIVE VAPORIZATION PROSTATE N/A 11/21/2019    Procedure: GREEN LIGHT LASER VAPORIZATION OF THE PROSTATE;  Surgeon: Lencho Germain MD;  Location:  OR     Family History   Problem Relation Age of Onset     Glaucoma Mother      Throat cancer Father      Rheumatoid Arthritis Sister      Alcoholism Brother      Liver Disease Brother             Allergies   Patient has no known allergies.    40 minutes spent on the date of the encounter doing chart review, history and exam, documentation and further activities as noted above      [facility]  Pager: [phone]

## 2023-03-30 ENCOUNTER — OFFICE VISIT (OUTPATIENT)
Dept: CARDIOLOGY | Facility: CLINIC | Age: 68
End: 2023-03-30
Attending: INTERNAL MEDICINE
Payer: COMMERCIAL

## 2023-03-30 VITALS
OXYGEN SATURATION: 98 % | SYSTOLIC BLOOD PRESSURE: 116 MMHG | DIASTOLIC BLOOD PRESSURE: 72 MMHG | WEIGHT: 277.7 LBS | HEIGHT: 72 IN | BODY MASS INDEX: 37.61 KG/M2 | HEART RATE: 68 BPM

## 2023-03-30 DIAGNOSIS — I10 BENIGN ESSENTIAL HYPERTENSION: ICD-10-CM

## 2023-03-30 DIAGNOSIS — I42.8 OTHER CARDIOMYOPATHY (H): Primary | ICD-10-CM

## 2023-03-30 DIAGNOSIS — E66.01 MORBID OBESITY (H): ICD-10-CM

## 2023-03-30 DIAGNOSIS — N18.4 CKD (CHRONIC KIDNEY DISEASE) STAGE 4, GFR 15-29 ML/MIN (H): ICD-10-CM

## 2023-03-30 PROCEDURE — 99214 OFFICE O/P EST MOD 30 MIN: CPT | Performed by: NURSE PRACTITIONER

## 2023-03-30 NOTE — LETTER
3/30/2023    Krzysztof Thakur MD  [address] [facility] [address]    RE: Ras Esparza       Dear Colleague,     I had the pleasure of seeing Ras Esparza in the [facility].  Cardiology Clinic Progress Note  Ras Esparza MRN# [id number]   YOB: 1955 Age: 68 year old   Primary Cardiologist: Dr. Adam Reason for visit: Cardiology follow up            Assessment and Plan:   Ras Esparza is a very pleasant 68 year old male here for CORE follow up.     1.  Chronic systolic heart failure/HFrEF - improved, LVEF 66% on cardiac MRI 3/2023, EF as low as 30% in October 2019              - NYHA class II, stage C              - Etiology : unknown, ? hypertensive              - Fluid status : euvolemic               - Goal weight: ~ 275#              - Diuretic regimen : none              - Last RHC : none              - Ischemic evaluation : cardiac MRI 3/2023 negative for ischemia              - Guideline directed medical therapy                          - Betablocker: carvedilol 12.5mg BID                          - ACEI/ARB/ARNI: none r/t LEIGHA/CKD                          - Aldactone antagonist: none r/t LEIGHA/CKD                          - Isosorbide mononitrate 60mg daily                          - Hydralazine 100mg TID  2. Chronic renal failure - secondary to bladder outlet obstruction with bilateral hydronephrosis and hydroureter, his initial creatinine was 9.59. Creatinine improved to baseline ~ 3, S/p TURP.               - Reinforced importance of continued monitoring with PCP and/or nephrology  3. Hypertension - controlled  4. Former tobacco use - quit tobacco use February 2019  5. Hyperlipidemia - controlled in setting of no known CAD, patient wishes to remain of statin therapy, LDL 91 3/2023.   6. Mild dilation of ascending aorta and aortic root on echo/MRI - 4.1cm  7. Obesity     I saw patient today for cardiology follow-up after cardiac testing. Echo 1/24/23 showed  further recovery in his LV function, LVEF 50-55%, ascending aorta 4.1cm, mild to moderate LVH, no significant valvular disease. Cardiac MRI 3/23/23 was negative for ischemic, normal biventricular function, LVEF 66% and RVEF 73% and delayed hyperenhancement reveals small area of mid inferior/mid inferolateral non CAD mid myocardial scar. Reviewed results and answered questions. Lipid panel from 3/2023 showed total cholesterol 159, HDL 33, LDL 91 and triglycerides 177. He continues to feel well with no cardiac symptoms, is compensated/euvolemic on exam. Okay to remain of statin and aspirin therapy given no known CAD and patient preference to remain off statin and aspirin. Recommended follow up with PCP and/or nephrology for continued monitoring of his renal disease. No medication changes today.     Changes today: none    Follow up plan:     Cardiology follow up with Dr. Adam in 1 year        History of Presenting Illness:    Ras Esparza is a very pleasant 68 year old male with a history of hypertension and tobacco use, who has not sought out routine care, recently hospitalization where he was diagnosed with HFrEF (likely ischemic), hypertension, acute kidney injury secondary to bladder outlet obstruction with bilateral hydronephrosis and hydroureter and anemia.      Patient hospitalized 6/21/19-6/25/19 related to acute kidney injury secondary to bladder outlet obstruction with bilateral hydronephrosis and hydroureter, his initial creatinine was 9.59. Creatinine improved to 4.73 6/28/19 without needing dialysis. NTproBNP 45,492. Complaints of worsening exertional dyspnea, lower extremity edema and one episode of typical chest pain/diaphoresis lasting 2hrs approx 1-2 months ago. Echocardiogram shows EF 30% with severe global hypokinesis, LV moderately dilated, RV normal size/function, no significant valvular disease. Etiology of cardiomyopathy unknown at this time, ischemic cardiomyopathy not excluded. Coronary  angiogram not pursued during hospital stay due to LEIGHA. Patient started on carvedilol, hydralazine and imdur. No ACEI/ARB or aldactone antagonist due to renal failure. Vascular calcifications noted on CT of abdomen/pelvis- started on statin therapy. Admission weight 256# Discharge weight 237#. Patient was discharged with milligan catheter and has been following with urology, underwent TURP on 11/21/19.     Nuclear stress test 10/3/2019 demonstrated partially reversible inferior and basal inferolateral defect. Small, reversible basal inferolateral defect suggests mild ischemia in the circumflex distribution. Medically managed given mild ischemia, no anginal symptoms and significant elevation in creatinine. Echocardiogram 10/15/2019 showed improvement in LV function with an EF of 46%, RV normal size and function, no significant valvular disease, mild aortic root dilatation (stable). Event monitor showed no arrhythmias.     Patient has done well from a cardiac standpoint with no hospitalizations and overall well controlled heart failure. He most recently saw Dr. Adam in January at which time he was doing well. Dr. Adam recommended echocardiogram and cardiac stress MRI.     Echo 1/24/23 showed further recovery in his LV function, LVEF 50-55%, ascending aorta 4.1cm, mild to moderate LVH, no significant valvular disease.     Cardiac MRI 3/23/23 was negative for ischemic, normal biventricular function, LVEF 66% and RVER 73% and delayed hyperenhancement reveals small area of mid inferior/mid inferolateral non CAD mid myocardial scar.     Patient is here today for CORE follow up.     Patient reports feeling good. Not monitoring weight at home. Denies shortness of breath at rest. Some exertional dyspnea with strenuous activity, notes he is able to use push  for ~ 1 hour. Denies orthopnea or PND. Some chronic left LE edema, which he states has improved. Denies chest pain or chest tightness. Denies dizziness,  lightheadedness or other presyncopal symptoms. Denies tachycardia or palpitations. Taking medications daily.     Lipid panel from 3/21/23 showed total cholesterol 159, HDL 33, LDL 91 and triglycerides 177. Blood pressure 116/72 and HR 68 in clinic today.    Appetite good, eating most meals at home. Trying to limit sodium intake. No set exercise routine, getting activities with ADLs and trying to go on short walks. Had plans to join health club. Denies alcohol use. Quit tobacco use February 2019. Works in IT.         Social History    , 1 daughter, on disability. Enjoys fishing. Enjoy the outdoors.   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      Drug use: Never     Sexual activity: Yes     Partners: Female   Other Topics Concern     Parent/sibling w/ CABG, MI or angioplasty before 65F 55M? Not Asked   Social History Narrative     Not on file     Social Determinants of Health     Financial Resource Strain: Not on file   Food Insecurity: Not on file   Transportation Needs: Not on file   Physical Activity: Not on file   Stress: Not on file   Social Connections: Not on file   Intimate Partner Violence: Not on file   Housing Stability: Not on file          Review of Systems:   10 point ROS neg other than the symptoms noted above in the HPI.         Physical Exam:   Vitals: There were no vitals taken for this visit.   Wt Readings  from Last 4 Encounters:   01/16/23 124.7 kg (275 lb)   09/24/21 125 kg (275 lb 8 oz)   03/29/21 125.6 kg (276 lb 14.4 oz)   01/16/20 108.6 kg (239 lb 6.4 oz)     GEN: well nourished, in no acute distress.  HEENT:  Pupils equal, round. Sclerae nonicteric.   NECK: Supple, no masses appreciated. JVP appears normal.   C/V:  Regular rate and rhythm, no murmur, rub or gallop.    RESP: Respirations are unlabored. Clear to auscultation bilaterally without wheezing, rales, or rhonchi.  GI: Abdomen soft, round, nontender.  EXTREM: No LE edema.  NEURO: Alert and oriented, cooperative.  SKIN: Warm and dry       Data:     LIPID RESULTS:  Lab Results   Component Value Date    CHOL 159 03/21/2023    CHOL 112 01/16/2020    HDL 33 (L) 03/21/2023    HDL 41 01/16/2020    LDL 91 03/21/2023    LDL 58 01/16/2020    TRIG 177 (H) 03/21/2023    TRIG 67 01/16/2020     LIVER ENZYME RESULTS:  Lab Results   Component Value Date    AST 16 09/24/2021    AST 15 06/21/2019    ALT 16 03/21/2023    ALT 38 06/21/2019     CBC RESULTS:  Lab Results   Component Value Date    WBC 8.3 09/24/2021    WBC 8.2 10/31/2019    RBC 4.42 09/24/2021    RBC 3.94 (L) 10/31/2019    HGB 13.9 09/24/2021    HGB 12.1 (L) 11/22/2019    HCT 43.0 09/24/2021    HCT 37.7 (L) 10/31/2019    MCV 97 09/24/2021    MCV 96 10/31/2019    MCH 31.4 09/24/2021    MCH 31.2 10/31/2019    MCHC 32.3 09/24/2021    MCHC 32.6 10/31/2019    RDW 13.8 09/24/2021    RDW 13.8 10/31/2019     09/24/2021     10/31/2019     BMP RESULTS:  Lab Results   Component Value Date     09/24/2021     07/10/2020    POTASSIUM 4.6 09/24/2021    POTASSIUM 4.7 07/10/2020    CHLORIDE 115 (H) 09/24/2021    CHLORIDE 115 (H) 07/10/2020    CO2 17 (L) 09/24/2021    CO2 17 (L) 07/10/2020    ANIONGAP 8 09/24/2021    ANIONGAP 7 07/10/2020     (H) 09/24/2021    GLC 87 07/10/2020    BUN 53 (H) 09/24/2021    BUN 43 (H) 07/10/2020    CR 3.03 (H) 09/24/2021    CR 2.78 (H) 07/10/2020    GFRESTIMATED  21 (L) 09/24/2021    GFRESTIMATED 23 (L) 07/10/2020    GFRESTBLACK 27 (L) 07/10/2020    ELICIA 8.5 09/24/2021    ELICIA 8.6 07/10/2020           Medications     Current Outpatient Medications   Medication Sig Dispense Refill     carvedilol (COREG) 12.5 MG tablet Take 1 tablet (12.5 mg) by mouth 2 times daily (with meals) 180 tablet 3     Ferrous Gluconate 240 (27 Fe) MG TABS Take 1 tablet by mouth daily        hydrALAZINE (APRESOLINE) 100 MG tablet Take 1 tablet (100 mg) by mouth 3 times daily 270 tablet 3     isosorbide mononitrate (IMDUR) 30 MG 24 hr tablet Take 2 tablets (60 mg) by mouth daily 180 tablet 3     magnesium oxide (MAG-OX) 400 MG tablet Take 1 tablet (400 mg) by mouth 2 times daily 60 tablet 0        Past Medical History     Past Medical History:   Diagnosis Date     Anemia, unspecified type 7/2/2019     Benign essential hypertension 11/5/2019     Bladder outflow obstruction 7/2/2019     Cardiomyopathy, unspecified type (H) 7/2/2019     CKD (chronic kidney disease) stage 4, GFR 15-29 ml/min (H) 7/2/2019     Hyperlipidemia LDL goal <70 8/25/2019     Tobacco abuse      Past Surgical History:   Procedure Laterality Date     HERNIORRHAPHY INGUINAL Left 12/19/2019    Procedure: OPEN REPAIR LEFT INGUINAL HERNIA WITH MESH;  Surgeon: Rao Schmidt MD;  Location:  OR     LASER KTP GREEN LIGHT PHOTOSELECTIVE VAPORIZATION PROSTATE N/A 11/21/2019    Procedure: GREEN LIGHT LASER VAPORIZATION OF THE PROSTATE;  Surgeon: Lencho Germain MD;  Location:  OR     Family History   Problem Relation Age of Onset     Glaucoma Mother      Throat cancer Father      Rheumatoid Arthritis Sister      Alcoholism Brother      Liver Disease Brother             Allergies   Patient has no known allergies.    40 minutes spent on the date of the encounter doing chart review, history and exam, documentation and further activities as noted above      MAGNOLIA Recinos [facility]  Pager:  [phone]    Thank you for allowing me to participate in the care of your patient.      Sincerely,     MAGNOLIA Recinos [facility]  cc:   Brandon Adam MD  [address]  [place] [address]

## 2023-03-30 NOTE — PATIENT INSTRUCTIONS
No medication changes  Follow up with Cardiology in 1 year   Follow up with primary care or nephrology for monitoring of your kidneys  Please call with any questions/concerns [phone]

## 2023-05-06 ENCOUNTER — HEALTH MAINTENANCE LETTER (OUTPATIENT)
Age: 68
End: 2023-05-06

## 2024-02-02 ENCOUNTER — TELEPHONE (OUTPATIENT)
Dept: INTERNAL MEDICINE | Facility: CLINIC | Age: 69
End: 2024-02-02
Payer: COMMERCIAL

## 2024-02-02 DIAGNOSIS — N18.4 CKD (CHRONIC KIDNEY DISEASE) STAGE 4, GFR 15-29 ML/MIN (H): Primary | ICD-10-CM

## 2024-02-02 DIAGNOSIS — Z12.5 SCREENING FOR PROSTATE CANCER: ICD-10-CM

## 2024-02-06 NOTE — TELEPHONE ENCOUNTER
Call pt. Not seen since 2021. Briefly spoke with pt today. Only being follow edby cardiology. Has hx reduced kidney function, etc and needs to get reestablished with  me and have follow-op of kidney status, etc  Call pt and assist with scheduling an appt with me in late February/early March in one of the open [facility] appt slots along with a fasting lab appt (blood and urine) in the week prior so I can go  over lab results with pt. Pu reason for appt as Medicare Wellness along with  follow-up CKD4. For fasting labs, have pt refrain from eating for 8 hours or more but tell him to drink 2 glasses of water before the  lab appointment. It is fine to take his oral medications on the morning of the lab test as usual    
 Pt has been notified 3 times. Will wait for hm to make appts as scheduled or return call to clinic  
3rd message left for pt to call back and make appts.  
Left message for pt to call back and schedule appts  
Patient Contact    Attempt # 1    Was call answered?  No.  Left message on voicemail with information to call me back.    On call back, please assist patient with scheduling per Dr. Thakur's recommendations below.     
Unable to reach patient after 3 failed attempts. Routing back to provider for further recommendations.     If wanting to send letter, please route to team.     
I have discussed with the Attending the patient's status, as well as the H&P and the fetal status. The Attending agreed with the suggested management.

## 2024-02-27 ENCOUNTER — LAB (OUTPATIENT)
Dept: LAB | Facility: CLINIC | Age: 69
End: 2024-02-27
Payer: COMMERCIAL

## 2024-02-27 DIAGNOSIS — Z12.5 SCREENING FOR PROSTATE CANCER: ICD-10-CM

## 2024-02-27 DIAGNOSIS — N18.4 CKD (CHRONIC KIDNEY DISEASE) STAGE 4, GFR 15-29 ML/MIN (H): ICD-10-CM

## 2024-02-27 LAB
ALBUMIN SERPL BCG-MCNC: 4.2 G/DL (ref 3.5–5.2)
ALBUMIN UR-MCNC: NEGATIVE MG/DL
ALP SERPL-CCNC: 100 U/L (ref 40–150)
ALT SERPL W P-5'-P-CCNC: 12 U/L (ref 0–70)
ANION GAP SERPL CALCULATED.3IONS-SCNC: 9 MMOL/L (ref 7–15)
APPEARANCE UR: ABNORMAL
AST SERPL W P-5'-P-CCNC: 19 U/L (ref 0–45)
BACTERIA #/AREA URNS HPF: ABNORMAL /HPF
BILIRUB SERPL-MCNC: 0.8 MG/DL
BILIRUB UR QL STRIP: NEGATIVE
BUN SERPL-MCNC: 22.4 MG/DL (ref 8–23)
CALCIUM SERPL-MCNC: 9.7 MG/DL (ref 8.8–10.2)
CHLORIDE SERPL-SCNC: 107 MMOL/L (ref 98–107)
CHOLEST SERPL-MCNC: 125 MG/DL
COLOR UR AUTO: YELLOW
CREAT SERPL-MCNC: 2.69 MG/DL (ref 0.67–1.17)
CREAT UR-MCNC: 107 MG/DL
DEPRECATED HCO3 PLAS-SCNC: 24 MMOL/L (ref 22–29)
EGFRCR SERPLBLD CKD-EPI 2021: 25 ML/MIN/1.73M2
ERYTHROCYTE [DISTWIDTH] IN BLOOD BY AUTOMATED COUNT: 13 % (ref 10–15)
FASTING STATUS PATIENT QL REPORTED: YES
GLUCOSE SERPL-MCNC: 107 MG/DL (ref 70–99)
GLUCOSE UR STRIP-MCNC: NEGATIVE MG/DL
HCT VFR BLD AUTO: 47.6 % (ref 40–53)
HDLC SERPL-MCNC: 36 MG/DL
HGB BLD-MCNC: 15.5 G/DL (ref 13.3–17.7)
HGB UR QL STRIP: NEGATIVE
KETONES UR STRIP-MCNC: NEGATIVE MG/DL
LDLC SERPL CALC-MCNC: 64 MG/DL
LEUKOCYTE ESTERASE UR QL STRIP: ABNORMAL
MCH RBC QN AUTO: 31.8 PG (ref 26.5–33)
MCHC RBC AUTO-ENTMCNC: 32.6 G/DL (ref 31.5–36.5)
MCV RBC AUTO: 98 FL (ref 78–100)
MICROALBUMIN UR-MCNC: 70.7 MG/L
MICROALBUMIN/CREAT UR: 66.07 MG/G CR (ref 0–17)
NITRATE UR QL: POSITIVE
NONHDLC SERPL-MCNC: 89 MG/DL
PH UR STRIP: 6 [PH] (ref 5–7)
PHOSPHATE SERPL-MCNC: 2.3 MG/DL (ref 2.5–4.5)
PLATELET # BLD AUTO: 193 10E3/UL (ref 150–450)
POTASSIUM SERPL-SCNC: 4.8 MMOL/L (ref 3.4–5.3)
PROT SERPL-MCNC: 6.9 G/DL (ref 6.4–8.3)
PSA SERPL DL<=0.01 NG/ML-MCNC: 4.34 NG/ML (ref 0–4.5)
PTH-INTACT SERPL-MCNC: 82 PG/ML (ref 15–65)
RBC # BLD AUTO: 4.87 10E6/UL (ref 4.4–5.9)
RBC #/AREA URNS AUTO: ABNORMAL /HPF
SODIUM SERPL-SCNC: 140 MMOL/L (ref 135–145)
SP GR UR STRIP: 1.02 (ref 1–1.03)
SQUAMOUS #/AREA URNS AUTO: ABNORMAL /LPF
TRIGL SERPL-MCNC: 124 MG/DL
UROBILINOGEN UR STRIP-ACNC: 0.2 E.U./DL
VIT D+METAB SERPL-MCNC: 22 NG/ML (ref 20–50)
WBC # BLD AUTO: 5.4 10E3/UL (ref 4–11)
WBC #/AREA URNS AUTO: ABNORMAL /HPF
WBC CLUMPS #/AREA URNS HPF: PRESENT /HPF

## 2024-02-27 PROCEDURE — 82570 ASSAY OF URINE CREATININE: CPT

## 2024-02-27 PROCEDURE — 82306 VITAMIN D 25 HYDROXY: CPT

## 2024-02-27 PROCEDURE — 87186 SC STD MICRODIL/AGAR DIL: CPT

## 2024-02-27 PROCEDURE — 84100 ASSAY OF PHOSPHORUS: CPT

## 2024-02-27 PROCEDURE — 82043 UR ALBUMIN QUANTITATIVE: CPT

## 2024-02-27 PROCEDURE — 85027 COMPLETE CBC AUTOMATED: CPT

## 2024-02-27 PROCEDURE — 81001 URINALYSIS AUTO W/SCOPE: CPT

## 2024-02-27 PROCEDURE — 80053 COMPREHEN METABOLIC PANEL: CPT

## 2024-02-27 PROCEDURE — 80061 LIPID PANEL: CPT

## 2024-02-27 PROCEDURE — 36415 COLL VENOUS BLD VENIPUNCTURE: CPT

## 2024-02-27 PROCEDURE — 83970 ASSAY OF PARATHORMONE: CPT

## 2024-02-27 PROCEDURE — G0103 PSA SCREENING: HCPCS

## 2024-02-27 PROCEDURE — 87086 URINE CULTURE/COLONY COUNT: CPT

## 2024-02-27 PROCEDURE — 87088 URINE BACTERIA CULTURE: CPT

## 2024-02-28 ENCOUNTER — OFFICE VISIT (OUTPATIENT)
Dept: INTERNAL MEDICINE | Facility: CLINIC | Age: 69
End: 2024-02-28
Payer: COMMERCIAL

## 2024-02-28 VITALS
RESPIRATION RATE: 16 BRPM | SYSTOLIC BLOOD PRESSURE: 132 MMHG | WEIGHT: 266 LBS | DIASTOLIC BLOOD PRESSURE: 80 MMHG | TEMPERATURE: 96.7 F | OXYGEN SATURATION: 99 % | HEART RATE: 67 BPM | BODY MASS INDEX: 36.08 KG/M2

## 2024-02-28 DIAGNOSIS — E78.5 HYPERLIPIDEMIA LDL GOAL <70: ICD-10-CM

## 2024-02-28 DIAGNOSIS — I42.9 CARDIOMYOPATHY, UNSPECIFIED TYPE (H): Primary | ICD-10-CM

## 2024-02-28 DIAGNOSIS — I10 BENIGN ESSENTIAL HYPERTENSION: ICD-10-CM

## 2024-02-28 DIAGNOSIS — E66.01 SEVERE OBESITY WITH BODY MASS INDEX (BMI) OF 35.0 TO 39.9 WITH COMORBIDITY (H): ICD-10-CM

## 2024-02-28 DIAGNOSIS — N18.4 CKD (CHRONIC KIDNEY DISEASE) STAGE 4, GFR 15-29 ML/MIN (H): ICD-10-CM

## 2024-02-28 DIAGNOSIS — Z23 NEED FOR PROPHYLACTIC VACCINATION AGAINST STREPTOCOCCUS PNEUMONIAE (PNEUMOCOCCUS): ICD-10-CM

## 2024-02-28 DIAGNOSIS — N39.0 URINARY TRACT INFECTION WITHOUT HEMATURIA, SITE UNSPECIFIED: ICD-10-CM

## 2024-02-28 DIAGNOSIS — E21.3 HYPERPARATHYROIDISM (H): ICD-10-CM

## 2024-02-28 PROCEDURE — G0009 ADMIN PNEUMOCOCCAL VACCINE: HCPCS | Performed by: INTERNAL MEDICINE

## 2024-02-28 PROCEDURE — 99214 OFFICE O/P EST MOD 30 MIN: CPT | Mod: 25 | Performed by: INTERNAL MEDICINE

## 2024-02-28 PROCEDURE — 90677 PCV20 VACCINE IM: CPT | Performed by: INTERNAL MEDICINE

## 2024-02-28 RX ORDER — HYDRALAZINE HYDROCHLORIDE 100 MG/1
100 TABLET, FILM COATED ORAL 3 TIMES DAILY
Qty: 270 TABLET | Refills: 3 | Status: SHIPPED | OUTPATIENT
Start: 2024-02-28

## 2024-02-28 RX ORDER — CARVEDILOL 12.5 MG/1
12.5 TABLET ORAL 2 TIMES DAILY WITH MEALS
Qty: 180 TABLET | Refills: 3 | Status: SHIPPED | OUTPATIENT
Start: 2024-02-28

## 2024-02-28 RX ORDER — ISOSORBIDE MONONITRATE 30 MG/1
60 TABLET, EXTENDED RELEASE ORAL DAILY
Qty: 180 TABLET | Refills: 3 | Status: SHIPPED | OUTPATIENT
Start: 2024-02-28

## 2024-02-28 RX ORDER — CIPROFLOXACIN 500 MG/1
500 TABLET, FILM COATED ORAL DAILY
Qty: 5 TABLET | Refills: 0 | Status: SHIPPED | OUTPATIENT
Start: 2024-02-28 | End: 2024-03-04

## 2024-02-28 NOTE — PROGRESS NOTES
ASSESSMENT:   1. Cardiomyopathy, unspecified type (H)  Significant improvement as above cardiac MRI with your adult with medication management.  Normalization of ejection fraction.  Continue medical management.  Patient to follow-up with cardiology per their previous recommendation  - hydrALAZINE (APRESOLINE) 100 MG tablet; Take 1 tablet (100 mg) by mouth 3 times daily  Dispense: 270 tablet; Refill: 3  - isosorbide mononitrate (IMDUR) 30 MG 24 hr tablet; Take 2 tablets (60 mg) by mouth daily  Dispense: 180 tablet; Refill: 3  - carvedilol (COREG) 12.5 MG tablet; Take 1 tablet (12.5 mg) by mouth 2 times daily (with meals)  Dispense: 180 tablet; Refill: 3  - Adult Cardiology Eval  Referral; Future    2. CKD (chronic kidney disease) stage 4, GFR 15-29 ml/min (H)  Recent GFR 25 which is the best it has been over 4+ years.  Continue medical management as above.  Labs as ordered in 1 month.  Patient establish care with nephrology in addition given stage IV kidney disease.  Counseled regarding maintaining low-sodium intake  - Basic metabolic panel; Future  - Vitamin D Deficiency; Future  - Parathyroid Hormone Intact; Future  - Adult Nephrology  Referral; Future       3. Hyperlipidemia LDL goal <70  Baseline   LDL 64 without statin therapy.  Continue diet management    4. Benign essential hypertension  Controlled.  Continue current medication  - hydrALAZINE (APRESOLINE) 100 MG tablet; Take 1 tablet (100 mg) by mouth 3 times daily  Dispense: 270 tablet; Refill: 3  - isosorbide mononitrate (IMDUR) 30 MG 24 hr tablet; Take 2 tablets (60 mg) by mouth daily  Dispense: 180 tablet; Refill: 3  - carvedilol (COREG) 12.5 MG tablet; Take 1 tablet (12.5 mg) by mouth 2 times daily (with meals)  Dispense: 180 tablet; Refill: 3    5. Urinary tract infection without hematuria, site unspecified  Denies fevers or chills.  Very minimal dysuria.  Will treat with Cipro pending full culture sensitivity results.  Initial  cultures growing Enterobacter Cloacae  - ciprofloxacin (CIPRO) 500 MG tablet; Take 1 tablet (500 mg) by mouth daily for 5 days  Dispense: 5 tablet; Refill: 0    6. Hyperparathyroidism (H24)  Improved.  Patient just recently started vitamin D ago.  Continue vitamin D supplementation repeat lab  - Basic metabolic panel; Future  - Vitamin D Deficiency; Future  - Parathyroid Hormone Intact; Future     7. Severe obesity with body mass index (BMI) of 35.0 to 39.9 with comorbidity (H)  Weight down 11 pounds from last visit.  Obesity contributing to hypertension, kidney disease, etc.  Counseled regarding calorie/carbohydrate reduction and increase in walking or other exercise as able    8. Need for prophylactic vaccination against Streptococcus pneumoniae (pneumococcus)  Candidate for vaccination  - PNEUMOCOCCAL 20 VALENT CONJUGATE (PREVNAR 20)          PLAN:   Cipro 500mg tab, 1 tab daily for 5 days for bladder infection pending final culture sensitivity results  Continue other medications  Prescriptions refilled.    Call  [phone] or use [employer] to schedule a future lab appointment  non-fasting in 1 month.  Blood and urine  Vaccinations: Prevnar 20 vaccine.  COVID and influenza vaccinations were also recommended for the patient but patient declines  Apt with  Nephrology (kidney). [employer] will call you to coordinate your  appt. If you don t hear from a representative within 2 business days, please call [phone].   Appt with  cardiology.  If you don't hear from a representative within 2 business days, please call [phone].   Reduce calorie/carbohydrate (sugar, bread, potato, pasta, rice, alcohol etc)  intake in diet.  Increase color on your plate with vegetables. Increase  frequency of walking or other aerobic exercise as able (goal is daily)             Anastasiia Mcginnis is a 69 year old, presenting for the following health issues:  Recheck Medication    History of Present Illness       CKD: He  is not using over the counter pain medicine.     Hypertension: He presents for follow up of hypertension.  He does check blood pressure  regularly outside of the clinic. Outside blood pressures have been over 140/90. He follows a low salt diet.     He eats 0-1 servings of fruits and vegetables daily.He consumes 0 sweetened beverage(s) daily.He exercises with enough effort to increase his heart rate 10 to 19 minutes per day.  He exercises with enough effort to increase his heart rate 3 or less days per week.   He is taking medications regularly.       Most recent lab results reviewed with pt.      Component      Latest Ref Rng 2/27/2024  9:02 AM 2/27/2024  9:05 AM   Sodium      135 - 145 mmol/L 140     Potassium      3.4 - 5.3 mmol/L 4.8     Carbon Dioxide (CO2)      22 - 29 mmol/L 24     Anion Gap      7 - 15 mmol/L 9     Urea Nitrogen      8.0 - 23.0 mg/dL 22.4     Creatinine      0.67 - 1.17 mg/dL 2.69 (H)     GFR Estimate      >60 mL/min/1.73m2 25 (L)     Calcium      8.8 - 10.2 mg/dL 9.7     Chloride      98 - 107 mmol/L 107     Glucose      70 - 99 mg/dL 107 (H)     Alkaline Phosphatase      40 - 150 U/L 100     AST      0 - 45 U/L 19     ALT      0 - 70 U/L 12     Protein Total      6.4 - 8.3 g/dL 6.9     Albumin      3.5 - 5.2 g/dL 4.2     Bilirubin Total      <=1.2 mg/dL 0.8     Color Urine      Colorless, Straw, Light Yellow, Yellow   Yellow    Appearance Urine      Clear   Slightly Cloudy !    Glucose Urine      Negative mg/dL  Negative    Bilirubin Urine      Negative   Negative    Ketones Urine      Negative mg/dL  Negative    Specific Gravity Urine      1.003 - 1.035   1.020    Blood Urine      Negative   Negative    pH Urine      5.0 - 7.0   6.0    Protein Albumin Urine      Negative mg/dL  Negative    Urobilinogen Urine      0.2, 1.0 E.U./dL  0.2    Nitrite Urine      Negative   Positive !    Leukocyte Esterase Urine      Negative   Moderate !    WBC      4.0 - 11.0 10e3/uL 5.4     RBC Count      4.40  "- 5.90 10e6/uL 4.87     Hemoglobin      13.3 - 17.7 g/dL 15.5     Hematocrit      40.0 - 53.0 % 47.6     MCV      78 - 100 fL 98     MCH      26.5 - 33.0 pg 31.8     MCHC      31.5 - 36.5 g/dL 32.6     RDW      10.0 - 15.0 % 13.0     Platelet Count      150 - 450 10e3/uL 193     Cholesterol      <200 mg/dL 125     Triglycerides      <150 mg/dL 124     HDL Cholesterol      >=40 mg/dL 36 (L)     LDL Cholesterol Calculated      <=100 mg/dL 64     Non HDL Cholesterol      <130 mg/dL 89     Patient Fasting? Yes     Bacteria Urine      None Seen /HPF  Many !    RBC Urine      0-2 /HPF /HPF  0-2    WBC Urine      0-5 /HPF /HPF   !    Squamous Epithelial /LPF Urine      None Seen /LPF  Few !    WBC Clumps      None Seen /HPF  Present !    Creatinine Urine      mg/dL  107.0    Albumin Urine mg/L      mg/L  70.7    Albumin Urine mg/g Cr      0.00 - 17.00 mg/g Cr  66.07 (H)    PSA Tumor Marker      0.00 - 4.50 ng/mL 4.34     Parathyroid Hormone Intact      15 - 65 pg/mL 82 (H)     Phosphorus      2.5 - 4.5 mg/dL 2.3 (L)     Vitamin D, Total (25-Hydroxy)      20 - 50 ng/mL 22        Patient seen by me last in 2019.  Saw Cardiology 1 year ago.  Plan at that time was to follow-up 1 year later and to continue same medications.  Previous history of significant cardiomyopathy with significant improvement with medical management.  Last cardiac MRI from March 2023 showed  \"Regadenoson induced stress perfusion is negative for ischemia. Normal biventricular size with normal systolic function. Quantitative LVEF 66 %.  Quantitative RVEF 73 %. Delayed hyperenhancement reveals small area of mid inferior/mid inferolateral non CAD mid myocardial scar.\"  Hx CKD4.  Not currently followed by nephrology.  Labs show slight improved renal function compared to 1 year ago.  Patient recently started taking vitamin D 1 week ago with a dose of 5000 units every other day.  Denies chest pain, shortness of breath, abdominal pain, headache, vision " changes or side effects with medications.  Minimal edema.  No orthopnea or PND.  Patient declines flu or COVID vaccinations.  Willing to have Prevnar 20 vaccine today.  Has not had RSV vaccine      Additional ROS:   Constitutional, HEENT, Cardiovascular, Pulmonary, GI and , Neuro, MSK and Psych review of systems/symptoms are otherwise negative or unchanged from previous, except as noted above.      OBJECTIVE:  /80 (BP Location: Left arm, Patient Position: Sitting, Cuff Size: Adult Large)   Pulse 67   Temp (!) 96.7  F (35.9  C) (Temporal)   Resp 16   Wt 120.7 kg (266 lb)   SpO2 99%   BMI 36.08 kg/m     Estimated body mass index is 36.08 kg/m  as calculated from the following:    Height as of 3/30/23: 1.829 m (6').    Weight as of this encounter: 120.7 kg (266 lb).     Neck: no adenopathy. Thyroid normal to palpation. No bruits  Pulm: Lungs clear to auscultation   CV: Regular rates and rhythm  GI: Soft, obese, nontender, Normal active bowel sounds, No hepatosplenomegaly or masses palpable  Ext: Peripheral pulses intact. Mild BLE distal  edema.  Neuro: Normal strength and tone, sensory exam grossly normal      (Chart documentation was completed, in part, with [employer] voice-recognition software. Even though reviewed, some grammatical, spelling, and word errors may remain.)    Krzysztof Thakur MD  Internal Medicine Department  [facility]

## 2024-02-29 PROBLEM — E66.01 SEVERE OBESITY WITH BODY MASS INDEX (BMI) OF 35.0 TO 39.9 WITH COMORBIDITY (H): Status: ACTIVE | Noted: 2024-02-29

## 2024-02-29 LAB — BACTERIA UR CULT: ABNORMAL

## 2024-02-29 NOTE — PATIENT INSTRUCTIONS
Cipro 500mg tab, 1 tab daily for 5 days for bladder infection   Continue other medications  Prescriptions refilled.    Call  [phone] or use [employer] to schedule a future lab appointment  non-fasting in 1 month.  Blood and urine  Vaccinations: Prevnar 20 vaccine  Apt with  Nephrology (kidney). [employer] will call you to coordinate your  appt. If you don t hear from a representative within 2 business days, please call [phone].   Appt with  cardiology.  If you don't hear from a representative within 2 business days, please call [phone].   Reduce calorie/carbohydrate (sugar, bread, potato, pasta, rice, alcohol etc)  intake in diet.  Increase color on your plate with vegetables. Increase  frequency of walking or other aerobic exercise as able (goal is daily)

## 2024-07-13 ENCOUNTER — HEALTH MAINTENANCE LETTER (OUTPATIENT)
Age: 69
End: 2024-07-13

## 2024-07-26 DIAGNOSIS — N40.1 BENIGN PROSTATIC HYPERPLASIA WITH LOWER URINARY TRACT SYMPTOMS, SYMPTOM DETAILS UNSPECIFIED: Primary | ICD-10-CM

## 2024-07-26 DIAGNOSIS — N18.4 CKD (CHRONIC KIDNEY DISEASE) STAGE 4, GFR 15-29 ML/MIN (H): ICD-10-CM

## 2024-07-31 ENCOUNTER — ANCILLARY PROCEDURE (OUTPATIENT)
Dept: ULTRASOUND IMAGING | Facility: CLINIC | Age: 69
End: 2024-07-31
Attending: STUDENT IN AN ORGANIZED HEALTH CARE EDUCATION/TRAINING PROGRAM
Payer: COMMERCIAL

## 2024-07-31 DIAGNOSIS — N18.4 CKD (CHRONIC KIDNEY DISEASE) STAGE 4, GFR 15-29 ML/MIN (H): ICD-10-CM

## 2024-07-31 DIAGNOSIS — N40.1 BENIGN PROSTATIC HYPERPLASIA WITH LOWER URINARY TRACT SYMPTOMS, SYMPTOM DETAILS UNSPECIFIED: ICD-10-CM

## 2024-07-31 PROCEDURE — 76770 US EXAM ABDO BACK WALL COMP: CPT

## 2024-08-21 ENCOUNTER — HOSPITAL ENCOUNTER (OUTPATIENT)
Dept: MRI IMAGING | Facility: CLINIC | Age: 69
Discharge: HOME | End: 2024-08-21
Attending: STUDENT IN AN ORGANIZED HEALTH CARE EDUCATION/TRAINING PROGRAM | Admitting: STUDENT IN AN ORGANIZED HEALTH CARE EDUCATION/TRAINING PROGRAM
Payer: COMMERCIAL

## 2024-08-21 DIAGNOSIS — N28.9 KIDNEY DISEASE: ICD-10-CM

## 2024-08-21 PROCEDURE — 74183 MRI ABD W/O CNTR FLWD CNTR: CPT

## 2024-08-21 PROCEDURE — 255N000002 HC RX 255 OP 636: Performed by: STUDENT IN AN ORGANIZED HEALTH CARE EDUCATION/TRAINING PROGRAM

## 2024-08-21 PROCEDURE — A9585 GADOBUTROL INJECTION: HCPCS | Performed by: STUDENT IN AN ORGANIZED HEALTH CARE EDUCATION/TRAINING PROGRAM

## 2024-08-21 RX ORDER — GADOBUTROL 604.72 MG/ML
0.1 INJECTION INTRAVENOUS ONCE
Status: COMPLETED | OUTPATIENT
Start: 2024-08-21 | End: 2024-08-21

## 2024-08-21 RX ADMIN — GADOBUTROL 12.07 ML: 604.72 INJECTION INTRAVENOUS at 11:31

## 2024-08-28 ENCOUNTER — VIRTUAL VISIT (OUTPATIENT)
Dept: UROLOGY | Facility: CLINIC | Age: 69
End: 2024-08-28
Payer: COMMERCIAL

## 2024-08-28 DIAGNOSIS — N40.1 BPH WITH OBSTRUCTION/LOWER URINARY TRACT SYMPTOMS: Primary | ICD-10-CM

## 2024-08-28 DIAGNOSIS — N13.8 BPH WITH OBSTRUCTION/LOWER URINARY TRACT SYMPTOMS: Primary | ICD-10-CM

## 2024-08-28 PROCEDURE — 99204 OFFICE O/P NEW MOD 45 MIN: CPT | Mod: 95 | Performed by: PHYSICIAN ASSISTANT

## 2024-08-28 ASSESSMENT — PAIN SCALES - GENERAL: PAINLEVEL: NO PAIN (0)

## 2024-08-28 NOTE — LETTER
8/28/2024       RE: Ras Esparza  [address] [facility] [address]     Dear Colleague,    Thank you for referring your patient, Ras Esparza, to the [facility] at [facility]. Please see a copy of my visit note below.    Virtual Visit Details    Type of service:  Video Visit   Video Start Time: 12:17 PM  Video End Time:12:33 PM    Originating Location (pt. Location): Home    Distant Location (provider location):  On-site  Platform used for Video Visit: Mark      CC: UTI    HPI:  Ras Esparza is a 69 year old male who presents in consultation from Dr. Thakur for evaluation of the above.     Hx of green light vap of prostate in 2019 with Dr. Germain due to cath dependent retention.   Noted back pain, freq, small vol, no dysuria, nocturia x 0.    Started on Flomax ~3 wks ago. Had an Enterobacter UTI 2/27/24.    Renal US done for CKD noted mild left hydro, left renal lesion (indeterminate) and 1.2cm nodule along the right lateral base of the bladder.   Recently had a follow-up to this US with renal MRI (Cr 2.69) per Neph. No renal mass.     Past Medical History:   Diagnosis Date     Anemia, unspecified type 7/2/2019     Benign essential hypertension 11/5/2019     Bladder outflow obstruction 7/2/2019     Cardiomyopathy, unspecified type (H) 7/2/2019     CKD (chronic kidney disease) stage 4, GFR 15-29 ml/min (H) 7/2/2019     Hyperlipidemia LDL goal <70 8/25/2019     Tobacco abuse        Past Surgical History:   Procedure Laterality Date     HERNIORRHAPHY INGUINAL Left 12/19/2019    Procedure: OPEN REPAIR LEFT INGUINAL HERNIA WITH MESH;  Surgeon: Rao Schmidt MD;  Location:  OR     LASER KTP GREEN LIGHT PHOTOSELECTIVE VAPORIZATION PROSTATE N/A 11/21/2019    Procedure: GREEN LIGHT LASER VAPORIZATION OF THE PROSTATE;  Surgeon: Lencho Germain MD;  Location:  OR       Social History     Socioeconomic History      Marital status:      Spouse name: Not on file     Number of children: 1     Years of education: Not on file     Highest education level: Not on file   Occupational History     Comment: tech support (IT)   Tobacco Use     Smoking status: Former     Current packs/day: 0.00     Average packs/day: 0.5 packs/day for 30.0 years (15.0 ttl pk-yrs)     Types: Cigarettes     Start date: 1989     Quit date: 2019     Years since quittin.2     Smokeless tobacco: Never     Tobacco comments:     12 cigarettes per day   Vaping Use     Vaping status: Never Used   Substance and Sexual Activity     Alcohol use: Not Currently     Alcohol/week: 0.0 standard drinks of alcohol     Comment: quit in 2018.      Drug use: Never     Sexual activity: Yes     Partners: Female   Other Topics Concern     Parent/sibling w/ CABG, MI or angioplasty before 65F 55M? Not Asked   Social History Narrative     Not on file     Social Determinants of Health     Financial Resource Strain: Not on file   Food Insecurity: Not on file   Transportation Needs: Not on file   Physical Activity: Not on file   Stress: Not on file   Social Connections: Not on file   Interpersonal Safety: Low Risk  (2024)    Interpersonal Safety      Do you feel physically and emotionally safe where you currently live?: Yes      Within the past 12 months, have you been hit, slapped, kicked or otherwise physically hurt by someone?: No      Within the past 12 months, have you been humiliated or emotionally abused in other ways by your partner or ex-partner?: No   Housing Stability: Not on file       Family History   Problem Relation Age of Onset     Glaucoma Mother      Throat cancer Father      Rheumatoid Arthritis Sister      Alcoholism Brother      Liver Disease Brother        No Known Allergies    Current Outpatient Medications   Medication Sig Dispense Refill     carvedilol (COREG) 12.5 MG tablet Take 1 tablet (12.5 mg) by mouth 2 times daily (with meals) 180  tablet 3     Cholecalciferol (VITAMIN D-3) 125 MCG (5000 UT) TABS Taking 1 tablet by mouth every other day       Ferrous Gluconate 240 (27 Fe) MG TABS Take 1 tablet by mouth daily        hydrALAZINE (APRESOLINE) 100 MG tablet Take 1 tablet (100 mg) by mouth 3 times daily 270 tablet 3     isosorbide mononitrate (IMDUR) 30 MG 24 hr tablet Take 2 tablets (60 mg) by mouth daily 180 tablet 3     magnesium oxide (MAG-OX) 400 MG tablet Take 1 tablet (400 mg) by mouth 2 times daily 60 tablet 0     No current facility-administered medications for this visit.         PEx:   There were no vitals taken for this visit.    PSYCH: NAD  EYES: EOMI  MOUTH: MMM  NEURO: AAO x3    A/P: Ras Esparza is a 69 year old male with hx of BPH w/ retention, s/p laser vap, mild left hydro.   -Flomax 0.4mg, daily.   -Cysto, eval for HOLDER/regrowth.   -PSA reviewed and up to date.    Marisela Nguyen PA-C  [facility]        25 minutes spent on the date of the encounter doing chart review, review of outside records, review of test results, interpretation of tests, patient visit and documentation                     Again, thank you for allowing me to participate in the care of your patient.      Sincerely,    Marisela Nguyen PA-C, [facility]

## 2024-08-28 NOTE — NURSING NOTE
Current patient location: [address] [place] [id number]    Is the patient currently in the state of [place]? YES    Visit mode:VIDEO    If the visit is dropped, the patient can be reconnected by: VIDEO VISIT: Text to cell phone:   Telephone Information:   Mobile [phone]    and VIDEO VISIT: Send to e-mail at: [email or web address]    Will anyone else be joining the visit? NO  (If patient encounters technical issues they should call [phone]    How would you like to obtain your AVS? MyChart    Are changes needed to the allergy or medication list? No    Are refills needed on medications prescribed by this physician? NO    Rooming Documentation:  Not applicable      Reason for visit: Consult ([facility])    Lisa MONTAÑO

## 2024-08-28 NOTE — PROGRESS NOTES
Virtual Visit Details    Type of service:  Video Visit   Video Start Time: 12:17 PM  Video End Time:12:33 PM    Originating Location (pt. Location): Home    Distant Location (provider location):  On-site  Platform used for Video Visit: Mark      CC: UTI    HPI:  Ras Esparza is a 69 year old male who presents in consultation from Dr. Thakur for evaluation of the above.     Hx of green light vap of prostate in 2019 with Dr. Germain due to cath dependent retention.   Noted back pain, freq, small vol, no dysuria, nocturia x 0.    Started on Flomax ~3 wks ago. Had an Enterobacter UTI 2/27/24.    Renal US done for CKD noted mild left hydro, left renal lesion (indeterminate) and 1.2cm nodule along the right lateral base of the bladder.   Recently had a follow-up to this US with renal MRI (Cr 2.69) per Neph. No renal mass.     Past Medical History:   Diagnosis Date    Anemia, unspecified type 7/2/2019    Benign essential hypertension 11/5/2019    Bladder outflow obstruction 7/2/2019    Cardiomyopathy, unspecified type (H) 7/2/2019    CKD (chronic kidney disease) stage 4, GFR 15-29 ml/min (H) 7/2/2019    Hyperlipidemia LDL goal <70 8/25/2019    Tobacco abuse        Past Surgical History:   Procedure Laterality Date    HERNIORRHAPHY INGUINAL Left 12/19/2019    Procedure: OPEN REPAIR LEFT INGUINAL HERNIA WITH MESH;  Surgeon: Rao Schmidt MD;  Location:  OR    LASER KTP GREEN LIGHT PHOTOSELECTIVE VAPORIZATION PROSTATE N/A 11/21/2019    Procedure: GREEN LIGHT LASER VAPORIZATION OF THE PROSTATE;  Surgeon: Lencho Germain MD;  Location:  OR       Social History     Socioeconomic History    Marital status:      Spouse name: Not on file    Number of children: 1    Years of education: Not on file    Highest education level: Not on file   Occupational History     Comment: tech support (IT)   Tobacco Use    Smoking status: Former     Current packs/day: 0.00     Average packs/day: 0.5 packs/day for 30.0  years (15.0 ttl pk-yrs)     Types: Cigarettes     Start date: 1989     Quit date: 2019     Years since quittin.2    Smokeless tobacco: Never    Tobacco comments:     12 cigarettes per day   Vaping Use    Vaping status: Never Used   Substance and Sexual Activity    Alcohol use: Not Currently     Alcohol/week: 0.0 standard drinks of alcohol     Comment: quit in 2018.     Drug use: Never    Sexual activity: Yes     Partners: Female   Other Topics Concern    Parent/sibling w/ CABG, MI or angioplasty before 65F 55M? Not Asked   Social History Narrative    Not on file     Social Determinants of Health     Financial Resource Strain: Not on file   Food Insecurity: Not on file   Transportation Needs: Not on file   Physical Activity: Not on file   Stress: Not on file   Social Connections: Not on file   Interpersonal Safety: Low Risk  (2024)    Interpersonal Safety     Do you feel physically and emotionally safe where you currently live?: Yes     Within the past 12 months, have you been hit, slapped, kicked or otherwise physically hurt by someone?: No     Within the past 12 months, have you been humiliated or emotionally abused in other ways by your partner or ex-partner?: No   Housing Stability: Not on file       Family History   Problem Relation Age of Onset    Glaucoma Mother     Throat cancer Father     Rheumatoid Arthritis Sister     Alcoholism Brother     Liver Disease Brother        No Known Allergies    Current Outpatient Medications   Medication Sig Dispense Refill    carvedilol (COREG) 12.5 MG tablet Take 1 tablet (12.5 mg) by mouth 2 times daily (with meals) 180 tablet 3    Cholecalciferol (VITAMIN D-3) 125 MCG (5000 UT) TABS Taking 1 tablet by mouth every other day      Ferrous Gluconate 240 (27 Fe) MG TABS Take 1 tablet by mouth daily       hydrALAZINE (APRESOLINE) 100 MG tablet Take 1 tablet (100 mg) by mouth 3 times daily 270 tablet 3    isosorbide mononitrate (IMDUR) 30 MG 24 hr tablet Take 2  tablets (60 mg) by mouth daily 180 tablet 3    magnesium oxide (MAG-OX) 400 MG tablet Take 1 tablet (400 mg) by mouth 2 times daily 60 tablet 0     No current facility-administered medications for this visit.         PEx:   There were no vitals taken for this visit.    PSYCH: NAD  EYES: EOMI  MOUTH: MMM  NEURO: AAO x3    A/P: Ras Esparza is a 69 year old male with hx of BPH w/ retention, s/p laser vap, mild left hydro.   -Flomax 0.4mg, daily.   -Cysto, eval for HOLDER/regrowth.   -PSA reviewed and up to date.    Marisela Nguyen PA-C  [facility]        25 minutes spent on the date of the encounter doing chart review, review of outside records, review of test results, interpretation of tests, patient visit and documentation

## 2024-10-31 ENCOUNTER — OFFICE VISIT (OUTPATIENT)
Dept: UROLOGY | Facility: CLINIC | Age: 69
End: 2024-10-31
Payer: COMMERCIAL

## 2024-10-31 VITALS — SYSTOLIC BLOOD PRESSURE: 150 MMHG | OXYGEN SATURATION: 100 % | DIASTOLIC BLOOD PRESSURE: 91 MMHG | HEART RATE: 60 BPM

## 2024-10-31 DIAGNOSIS — R31.21 ASYMPTOMATIC MICROSCOPIC HEMATURIA: ICD-10-CM

## 2024-10-31 DIAGNOSIS — N13.8 BPH WITH OBSTRUCTION/LOWER URINARY TRACT SYMPTOMS: Primary | ICD-10-CM

## 2024-10-31 DIAGNOSIS — N40.1 BPH WITH OBSTRUCTION/LOWER URINARY TRACT SYMPTOMS: Primary | ICD-10-CM

## 2024-10-31 LAB
ALBUMIN UR-MCNC: 30 MG/DL
APPEARANCE UR: CLEAR
BILIRUB UR QL STRIP: NEGATIVE
COLOR UR AUTO: YELLOW
GLUCOSE UR STRIP-MCNC: NEGATIVE MG/DL
HGB UR QL STRIP: NEGATIVE
KETONES UR STRIP-MCNC: NEGATIVE MG/DL
LEUKOCYTE ESTERASE UR QL STRIP: ABNORMAL
NITRATE UR QL: NEGATIVE
PH UR STRIP: 7.5 [PH] (ref 5–7)
SP GR UR STRIP: 1.01 (ref 1–1.03)
UROBILINOGEN UR STRIP-ACNC: 0.2 E.U./DL

## 2024-10-31 PROCEDURE — 88112 CYTOPATH CELL ENHANCE TECH: CPT | Performed by: PATHOLOGY

## 2024-10-31 PROCEDURE — 52000 CYSTOURETHROSCOPY: CPT | Performed by: UROLOGY

## 2024-10-31 PROCEDURE — 87086 URINE CULTURE/COLONY COUNT: CPT | Performed by: UROLOGY

## 2024-10-31 PROCEDURE — 87088 URINE BACTERIA CULTURE: CPT | Performed by: UROLOGY

## 2024-10-31 PROCEDURE — 99213 OFFICE O/P EST LOW 20 MIN: CPT | Mod: 25 | Performed by: UROLOGY

## 2024-10-31 PROCEDURE — 81003 URINALYSIS AUTO W/O SCOPE: CPT | Mod: QW | Performed by: UROLOGY

## 2024-10-31 NOTE — PROGRESS NOTES
Office Visit Note  [facility]  [phone]    UROLOGIC DIAGNOSES:   Enlarged prostate with history of retention  Urinary tract infection    CURRENT INTERVENTIONS:   Greenlight PVP 2019 (Dr Germain)    HISTORY:   This is a 69-year-old gentleman who was previously in urinary retention in 2019.  He had a photovaporization of the prostate using the greenlight laser with Dr. Germain at that time and it resolved his retention.  He was referred back to urology because of a urinary tract infection.  He also had an ultrasound showing a potential small bladder mass on the right side of the bladder.  He is currently asymptomatic.  Urinalyses have not shown any hematuria.  He is here today for a cystoscopy for further evaluation.  He says he is somewhat bothered by a slow urinary stream but he does not have any nocturia at night.      PAST MEDICAL HISTORY:   Past Medical History:   Diagnosis Date    Anemia, unspecified type 7/2/2019    Benign essential hypertension 11/5/2019    Bladder outflow obstruction 7/2/2019    Cardiomyopathy, unspecified type (H) 7/2/2019    CKD (chronic kidney disease) stage 4, GFR 15-29 ml/min (H) 7/2/2019    Hyperlipidemia LDL goal <70 8/25/2019    Tobacco abuse        PAST SURGICAL HISTORY:   Past Surgical History:   Procedure Laterality Date    HERNIORRHAPHY INGUINAL Left 12/19/2019    Procedure: OPEN REPAIR LEFT INGUINAL HERNIA WITH MESH;  Surgeon: Rao Schmidt MD;  Location:  OR    LASER KTP GREEN LIGHT PHOTOSELECTIVE VAPORIZATION PROSTATE N/A 11/21/2019    Procedure: GREEN LIGHT LASER VAPORIZATION OF THE PROSTATE;  Surgeon: Lencho Germain MD;  Location:  OR       FAMILY HISTORY:   Family History   Problem Relation Age of Onset    Glaucoma Mother     Throat cancer Father     Rheumatoid Arthritis Sister     Alcoholism Brother     Liver Disease Brother        SOCIAL HISTORY:   Social History     Socioeconomic History    Marital status:      Spouse name:  None    Number of children: 1    Years of education: None    Highest education level: None   Occupational History     Comment: tech support (IT)   Tobacco Use    Smoking status: Former     Current packs/day: 0.00     Average packs/day: 0.5 packs/day for 30.0 years (15.0 ttl pk-yrs)     Types: Cigarettes     Start date: 1989     Quit date: 2019     Years since quittin.4    Smokeless tobacco: Never    Tobacco comments:     12 cigarettes per day   Vaping Use    Vaping status: Never Used   Substance and Sexual Activity    Alcohol use: Not Currently     Alcohol/week: 0.0 standard drinks of alcohol     Comment: quit in 2018.     Drug use: Never    Sexual activity: Yes     Partners: Female     Social Drivers of Health     Interpersonal Safety: Low Risk  (2024)    Interpersonal Safety     Do you feel physically and emotionally safe where you currently live?: Yes     Within the past 12 months, have you been hit, slapped, kicked or otherwise physically hurt by someone?: No     Within the past 12 months, have you been humiliated or emotionally abused in other ways by your partner or ex-partner?: No       Review Of Systems:  Skin: No rash, pruritis, or skin pigmentation  Eyes: No changes in vision  Ears/Nose/Throat: No changes in hearing, no nosebleeds  Respiratory: No shortness of breath, dyspnea on exertion, cough, or hemoptysis  Cardiovascular: No chest pain or palpitations  Gastrointestinal: No diarrhea or constipation. No abdominal pain. No hematochezia  Genitourinary: see HPI  Musculoskeletal: No pain or swelling of joints, normal range of motion  Neurologic: No weakness or tremors  Psychiatric: No recent changes in memory or mood  Hematologic/Lymphatic/Immunologic: No easy bruising or enlarged lymph nodes  Endocrine: No weight gain or loss      PHYSICAL EXAM:    BP (!) 150/91   Pulse 60   SpO2 100%     Constitutional: Well developed. Conversant and in no acute distress  Eyes: Anicteric sclera,  conjunctiva clear, normal extraocular movements  ENT: Normocephalic and atraumatic,   Skin: Warm and dry. No rashes or lesions  Cardiac: No peripheral edema  Back/Flank: Not done  CNS/PNS: Normal musculature and movements, moves all extremities normally  Respiratory: Normal non-labored breathing  Abdomen: Soft nontender and nondistended  Peripheral Vascular: No peripheral edema  Mental Status/Psych: Alert and Oriented x 3. 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  I did not see a bladder tumor.  There were trabeculations in the bladder.  In pulling back the scope he has substantial obstruction from anterior obstructing tissue in the prostate.  There is no lateral lobe or median lobe tissue remaining.  Also of note, his bladder was not completely empty at the beginning of the procedure.    Imaging: I personally reviewed his renal and bladder ultrasound images.  There is a nodule on the right side of the bladder consistent with either a bladder fold or a small bladder mass.    Urinalysis: UA RESULTS:  Recent Labs   Lab Test 10/31/24  1020 02/27/24  0905   COLOR Yellow Yellow   APPEARANCE Clear Slightly Cloudy*   URINEGLC Negative Negative   URINEBILI Negative Negative   URINEKETONE Negative Negative   SG 1.015 1.020   UBLD Negative Negative   URINEPH 7.5* 6.0   PROTEIN 30* Negative   UROBILINOGEN 0.2 0.2   NITRITE Negative Positive*   LEUKEST Small* Moderate*   RBCU  --  0-2   WBCU  --  *       PSA:     Post Void Residual:     Other labs: None today      IMPRESSION:  Enlarged prostate  Incomplete bladder emptying    PLAN:  We discussed my cystoscopy findings.  The potential mass seen on ultrasound likely corresponds to the areas of cystitis cystica and trabeculations I  am seeing in the bladder.  Urine sample from today will be sent for cytology.    We discussed his enlarged prostate and residual/regrowth of tissue of the anterior prostate.  If he did have bothersome urinary symptoms or worsening retention he could consider a transurethral section of the prostate to remove the anterior tissue.  We discussed this treatment option detail today.  He does not want to do this at this time but may consider in the future.    I recommend he see me again in 1 year to check another urinalysis, check an updated PSA, check on his bladder emptying again        Carlito Zayas M.D.

## 2024-10-31 NOTE — NURSING NOTE
Prior to the start of the procedure and with procedural staff participation, I verbally confirmed the patient s identity using two indicators, relevant allergies, that the procedure was appropriate and matched the consent or emergent situation, and that the correct equipment/implants were available. Immediately prior to starting the procedure I conducted the Time Out with the procedural staff and re-confirmed the patient s name, procedure, and site/side. I have wiped the patient off with the povidone-Iodine solution, draped them,  used Lidocaine hydrochloride jelly, and instilled sterile water into the bladder. (The Joint Commission universal protocol was followed.)  Yes    Sedation (Moderate or Deep): None    Pt had TURP 5ish years ago at [place] [facility].  Pt states his flow is slower.    Pt on flomax for about a month... pt has not noticed any difference.    Pt states he leaks at ntLavell Watkins CMA

## 2024-10-31 NOTE — LETTER
10/31/2024       RE: Ras Esparza  [address] [address]     Dear Colleague,    Thank you for referring your patient, Ras Esparza, to the [facility] [place] at [facility]. Please see a copy of my visit note below.    Office Visit Note  [facility]  [phone]    UROLOGIC DIAGNOSES:   Enlarged prostate with history of retention  Urinary tract infection    CURRENT INTERVENTIONS:   Greenlight PVP 2019 (Dr Germain)    HISTORY:   This is a 69-year-old gentleman who was previously in urinary retention in 2019.  He had a photovaporization of the prostate using the greenlight laser with Dr. Germain at that time and it resolved his retention.  He was referred back to urology because of a urinary tract infection.  He also had an ultrasound showing a potential small bladder mass on the right side of the bladder.  He is currently asymptomatic.  Urinalyses have not shown any hematuria.  He is here today for a cystoscopy for further evaluation.  He says he is somewhat bothered by a slow urinary stream but he does not have any nocturia at night.      PAST MEDICAL HISTORY:   Past Medical History:   Diagnosis Date     Anemia, unspecified type 7/2/2019     Benign essential hypertension 11/5/2019     Bladder outflow obstruction 7/2/2019     Cardiomyopathy, unspecified type (H) 7/2/2019     CKD (chronic kidney disease) stage 4, GFR 15-29 ml/min (H) 7/2/2019     Hyperlipidemia LDL goal <70 8/25/2019     Tobacco abuse        PAST SURGICAL HISTORY:   Past Surgical History:   Procedure Laterality Date     HERNIORRHAPHY INGUINAL Left 12/19/2019    Procedure: OPEN REPAIR LEFT INGUINAL HERNIA WITH MESH;  Surgeon: Rao Schmidt MD;  Location: [facility] OR     LASER KTP GREEN LIGHT PHOTOSELECTIVE VAPORIZATION PROSTATE N/A 11/21/2019    Procedure: GREEN LIGHT LASER VAPORIZATION OF THE PROSTATE;  Surgeon: Lencho Germain MD;   Location:  OR       FAMILY HISTORY:   Family History   Problem Relation Age of Onset     Glaucoma Mother      Throat cancer Father      Rheumatoid Arthritis Sister      Alcoholism Brother      Liver Disease Brother        SOCIAL HISTORY:   Social History     Socioeconomic History     Marital status:      Spouse name: None     Number of children: 1     Years of education: None     Highest education level: None   Occupational History     Comment: tech support (IT)   Tobacco Use     Smoking status: Former     Current packs/day: 0.00     Average packs/day: 0.5 packs/day for 30.0 years (15.0 ttl pk-yrs)     Types: Cigarettes     Start date: 1989     Quit date: 2019     Years since quittin.4     Smokeless tobacco: Never     Tobacco comments:     12 cigarettes per day   Vaping Use     Vaping status: Never Used   Substance and Sexual Activity     Alcohol use: Not Currently     Alcohol/week: 0.0 standard drinks of alcohol     Comment: quit in 2018.      Drug use: Never     Sexual activity: Yes     Partners: Female     Social Drivers of Health     Interpersonal Safety: Low Risk  (2024)    Interpersonal Safety      Do you feel physically and emotionally safe where you currently live?: Yes      Within the past 12 months, have you been hit, slapped, kicked or otherwise physically hurt by someone?: No      Within the past 12 months, have you been humiliated or emotionally abused in other ways by your partner or ex-partner?: No       Review Of Systems:  Skin: No rash, pruritis, or skin pigmentation  Eyes: No changes in vision  Ears/Nose/Throat: No changes in hearing, no nosebleeds  Respiratory: No shortness of breath, dyspnea on exertion, cough, or hemoptysis  Cardiovascular: No chest pain or palpitations  Gastrointestinal: No diarrhea or constipation. No abdominal pain. No hematochezia  Genitourinary: see HPI  Musculoskeletal: No pain or swelling of joints, normal range of motion  Neurologic: No  weakness or tremors  Psychiatric: No recent changes in memory or mood  Hematologic/Lymphatic/Immunologic: No easy bruising or enlarged lymph nodes  Endocrine: No weight gain or loss      PHYSICAL EXAM:    BP (!) 150/91   Pulse 60   SpO2 100%     Constitutional: Well developed. Conversant and in no acute distress  Eyes: Anicteric sclera, conjunctiva clear, normal extraocular movements  ENT: Normocephalic and atraumatic,   Skin: Warm and dry. No rashes or lesions  Cardiac: No peripheral edema  Back/Flank: Not done  CNS/PNS: Normal musculature and movements, moves all extremities normally  Respiratory: Normal non-labored breathing  Abdomen: Soft nontender and nondistended  Peripheral Vascular: No peripheral edema  Mental Status/Psych: Alert and Oriented x 3. 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  I did not see a bladder tumor.  There were trabeculations in the bladder.  In pulling back the scope he has substantial obstruction from anterior obstructing tissue in the prostate.  There is no lateral lobe or median lobe tissue remaining.  Also of note, his bladder was not completely empty at the beginning of the procedure.    Imaging: I personally reviewed his renal and bladder ultrasound images.  There is a nodule on the right side of the bladder consistent with either a bladder fold or a small bladder mass.    Urinalysis: UA RESULTS:  Recent Labs   Lab Test 10/31/24  1020 02/27/24  0905   COLOR Yellow Yellow   APPEARANCE Clear Slightly Cloudy*   URINEGLC Negative Negative   URINEBILI Negative Negative   URINEKETONE Negative Negative   SG 1.015 1.020   UBLD Negative Negative   URINEPH 7.5* 6.0   PROTEIN 30* Negative   UROBILINOGEN 0.2 0.2   NITRITE Negative Positive*   LEUKEST  Small* Moderate*   RBCU  --  0-2   WBCU  --  *       PSA:     Post Void Residual:     Other labs: None today      IMPRESSION:  Enlarged prostate  Incomplete bladder emptying    PLAN:  We discussed my cystoscopy findings.  The potential mass seen on ultrasound likely corresponds to the areas of cystitis cystica and trabeculations I am seeing in the bladder.  Urine sample from today will be sent for cytology.    We discussed his enlarged prostate and residual/regrowth of tissue of the anterior prostate.  If he did have bothersome urinary symptoms or worsening retention he could consider a transurethral section of the prostate to remove the anterior tissue.  We discussed this treatment option detail today.  He does not want to do this at this time but may consider in the future.    I recommend he see me again in 1 year to check another urinalysis, check an updated PSA, check on his bladder emptying again        Carlito Zayas M.D.              Again, thank you for allowing me to participate in the care of your patient.      Sincerely,    Carlito Zayas MD

## 2024-10-31 NOTE — PATIENT INSTRUCTIONS

## 2024-11-01 LAB — BACTERIA UR CULT: ABNORMAL

## 2024-11-03 LAB
PATH REPORT.COMMENTS IMP SPEC: NORMAL
PATH REPORT.FINAL DX SPEC: NORMAL
PATH REPORT.GROSS SPEC: NORMAL
PATH REPORT.MICROSCOPIC SPEC OTHER STN: NORMAL

## 2025-06-07 ENCOUNTER — TRANSFERRED RECORDS (OUTPATIENT)
Dept: HEALTH INFORMATION MANAGEMENT | Facility: CLINIC | Age: 70
End: 2025-06-07
Payer: COMMERCIAL

## 2025-07-02 ENCOUNTER — VIRTUAL VISIT (OUTPATIENT)
Dept: INTERNAL MEDICINE | Facility: CLINIC | Age: 70
End: 2025-07-02
Payer: COMMERCIAL

## 2025-07-02 ENCOUNTER — LAB (OUTPATIENT)
Dept: LAB | Facility: CLINIC | Age: 70
End: 2025-07-02
Payer: COMMERCIAL

## 2025-07-02 ENCOUNTER — RESULTS FOLLOW-UP (OUTPATIENT)
Dept: INTERNAL MEDICINE | Facility: CLINIC | Age: 70
End: 2025-07-02

## 2025-07-02 DIAGNOSIS — N39.0 URINARY TRACT INFECTION WITHOUT HEMATURIA, SITE UNSPECIFIED: ICD-10-CM

## 2025-07-02 DIAGNOSIS — R30.0 DYSURIA: ICD-10-CM

## 2025-07-02 DIAGNOSIS — R30.0 DYSURIA: Primary | ICD-10-CM

## 2025-07-02 LAB
ALBUMIN UR-MCNC: ABNORMAL MG/DL
APPEARANCE UR: ABNORMAL
BACTERIA #/AREA URNS HPF: ABNORMAL /HPF
BILIRUB UR QL STRIP: NEGATIVE
COLOR UR AUTO: YELLOW
GLUCOSE UR STRIP-MCNC: NEGATIVE MG/DL
HGB UR QL STRIP: NEGATIVE
KETONES UR STRIP-MCNC: NEGATIVE MG/DL
LEUKOCYTE ESTERASE UR QL STRIP: ABNORMAL
NITRATE UR QL: NEGATIVE
PH UR STRIP: 8 [PH] (ref 5–7)
RBC #/AREA URNS AUTO: ABNORMAL /HPF
SP GR UR STRIP: 1.01 (ref 1–1.03)
SQUAMOUS #/AREA URNS AUTO: ABNORMAL /LPF
TRI-PHOS CRY #/AREA URNS HPF: ABNORMAL /HPF
UROBILINOGEN UR STRIP-ACNC: 0.2 E.U./DL
WBC #/AREA URNS AUTO: ABNORMAL /HPF
WBC CLUMPS #/AREA URNS HPF: PRESENT /HPF

## 2025-07-02 PROCEDURE — 87086 URINE CULTURE/COLONY COUNT: CPT

## 2025-07-02 PROCEDURE — 87088 URINE BACTERIA CULTURE: CPT

## 2025-07-02 PROCEDURE — 81001 URINALYSIS AUTO W/SCOPE: CPT

## 2025-07-02 PROCEDURE — 98006 SYNCH AUDIO-VIDEO EST MOD 30: CPT | Performed by: INTERNAL MEDICINE

## 2025-07-02 RX ORDER — CIPROFLOXACIN 500 MG/1
500 TABLET, FILM COATED ORAL DAILY
Qty: 7 TABLET | Refills: 0 | Status: SHIPPED | OUTPATIENT
Start: 2025-07-02

## 2025-07-02 NOTE — PROGRESS NOTES
Ras is a 70 year old who is being evaluated via a billable video visit.    How would you like to obtain your AVS? MyChart  If the video visit is dropped, the invitation should be resent by: [phone]  Will anyone else be joining your video visit? No      Assessment & Plan              (N39.0) Urinary tract infection without hematuria, site unspecified  Plan:  check UA with Microscopic reflex to Culture - lab collect , I reviewed urine analysis which is consistent with urinary tract infection, reviewed patient's previous creatinine, started on ciprofloxacin (CIPRO) 500 MG tablet 1 tablet daily as directed.explained clearly about the medication,insructions and side effects.  Urine culture pending.  Call your PCP prn if these symtoms worsen, fail to improve as anticipated, or if new symptoms develop.        (R30.0) Dysuria     Plan: UA with Microscopic reflex to Culture - lab         collect      Review of the result(s) of each unique test - UA  Prescription drug management  30 minutes spent by me on the date of the encounter doing chart review, history and exam, documentation and further activities per the note        Subjective   Ras is a 70 year old, presenting for the following health issues:  Consult (dysuria)      7/2/2025    11:04 AM   Additional Questions   Roomed by osmar   Accompanied by self         7/2/2025    11:04 AM   Patient Reported Additional Medications   Patient reports taking the following new medications none     Video Start Time: 11:08 AM    History of Present Illness       Reason for visit:  Cloudy white clumps in my urine    He eats 0-1 servings of fruits and vegetables daily.He consumes 4 sweetened beverage(s) daily.He exercises with enough effort to increase his heart rate 20 to 29 minutes per day.  He exercises with enough effort to increase his heart rate 4 days per week.   He is taking medications regularly.      Genitourinary - Male  Onset/Duration: 2 weeks -  cloudy urine    Description:   Dysuria (painful urination): YES  Hematuria (blood in urine): No  Frequency: No  Waking at night to urinate: No  Hesitancy (delay in urine): No  Retention (unable to empty): No  Decrease in urinary flow: YES  Incontinence: No  Progression of Symptoms:  same  Accompanying Signs & Symptoms:  Fever: No  Back/Flank pain: No  Urethral discharge: not sure   Testicle lumps/masses/pain: No  Nausea and/or vomiting: No  Abdominal pain: No  History:   History of frequent UTI s: No  History of kidney stones: No    Precipitating or alleviating factors: None  Therapies tried and outcome: none      Past Medical History:   Diagnosis Date    Anemia, unspecified type 7/2/2019    Benign essential hypertension 11/5/2019    Bladder outflow obstruction 7/2/2019    Cardiomyopathy, unspecified type (H) 7/2/2019    CKD (chronic kidney disease) stage 4, GFR 15-29 ml/min (H) 7/2/2019    Hyperlipidemia LDL goal <70 8/25/2019    Tobacco abuse        Current Outpatient Medications   Medication Sig Dispense Refill    carvedilol (COREG) 12.5 MG tablet Take 1 tablet (12.5 mg) by mouth 2 times daily (with meals) 180 tablet 3    Cholecalciferol (VITAMIN D-3) 125 MCG (5000 UT) TABS Taking 1 tablet by mouth every other day      Ferrous Gluconate 240 (27 Fe) MG TABS Take 1 tablet by mouth daily       hydrALAZINE (APRESOLINE) 100 MG tablet Take 1 tablet (100 mg) by mouth 3 times daily 270 tablet 3    isosorbide mononitrate (IMDUR) 30 MG 24 hr tablet Take 2 tablets (60 mg) by mouth daily 180 tablet 3    magnesium oxide (MAG-OX) 400 MG tablet Take 1 tablet (400 mg) by mouth 2 times daily 60 tablet 0           Review of Systems  CONSTITUTIONAL: NEGATIVE for fever, chills,    : dysuria      Objective           Vitals:  No vitals were obtained today due to virtual visit.    Physical Exam   GENERAL: alert and no distress  EYES: Eyes grossly normal to inspection.  No discharge or erythema, or obvious scleral/conjunctival abnormalities.  RESP:  No audible wheeze, cough, or visible cyanosis.      Recent Results (from the past 24 hours)   UA with Microscopic reflex to Culture - lab collect    Specimen: Urine, Midstream   Result Value Ref Range    Color Urine Yellow Colorless, Straw, Light Yellow, Yellow    Appearance Urine Cloudy (A) Clear    Glucose Urine Negative Negative mg/dL    Bilirubin Urine Negative Negative    Ketones Urine Negative Negative mg/dL    Specific Gravity Urine 1.015 1.003 - 1.035    Blood Urine Negative Negative    pH Urine 8.0 (H) 5.0 - 7.0    Protein Albumin Urine Trace (A) Negative mg/dL    Urobilinogen Urine 0.2 0.2, 1.0 E.U./dL    Nitrite Urine Negative Negative    Leukocyte Esterase Urine Large (A) Negative   UA Microscopic with Reflex to Culture   Result Value Ref Range    Bacteria Urine Many (A) None Seen /HPF    RBC Urine 0-2 0-2 /HPF /HPF    WBC Urine 25-50 (A) 0-5 /HPF /HPF    Squamous Epithelials Urine Few (A) None Seen /LPF    WBC Clumps Urine Present (A) None Seen /HPF    Triple Phosphate Crystals Urine Moderate (A) None Seen /HPF         Video-Visit Details    Type of service:  Video Visit   Video End Time:11:28 AM  Originating Location (pt. Location): Home    Distant Location (provider location):  On-site  Platform used for Video Visit: Doximity  Signed Electronically by: Lauri Augustin MD

## 2025-07-03 LAB — BACTERIA UR CULT: ABNORMAL

## 2025-07-19 ENCOUNTER — HEALTH MAINTENANCE LETTER (OUTPATIENT)
Age: 70
End: 2025-07-19

## 2025-08-08 ENCOUNTER — HOSPITAL ENCOUNTER (OUTPATIENT)
Dept: ULTRASOUND IMAGING | Facility: CLINIC | Age: 70
Discharge: HOME | End: 2025-08-08
Attending: STUDENT IN AN ORGANIZED HEALTH CARE EDUCATION/TRAINING PROGRAM | Admitting: STUDENT IN AN ORGANIZED HEALTH CARE EDUCATION/TRAINING PROGRAM
Payer: COMMERCIAL

## 2025-08-08 DIAGNOSIS — Z87.448 HISTORY OF HYDRONEPHROSIS: ICD-10-CM

## 2025-08-08 PROCEDURE — 76770 US EXAM ABDO BACK WALL COMP: CPT

## 2025-08-22 ENCOUNTER — TRANSFERRED RECORDS (OUTPATIENT)
Dept: HEALTH INFORMATION MANAGEMENT | Facility: CLINIC | Age: 70
End: 2025-08-22
Payer: COMMERCIAL

## 2025-08-26 ENCOUNTER — TRANSCRIBE ORDERS (OUTPATIENT)
Dept: OTHER | Age: 70
End: 2025-08-26

## 2025-08-26 DIAGNOSIS — R13.19 ESOPHAGEAL DYSPHAGIA: Primary | ICD-10-CM

## 2025-08-29 ENCOUNTER — TELEPHONE (OUTPATIENT)
Dept: INTERNAL MEDICINE | Facility: CLINIC | Age: 70
End: 2025-08-29
Payer: COMMERCIAL

## 2025-09-03 ENCOUNTER — VIRTUAL VISIT (OUTPATIENT)
Dept: INTERNAL MEDICINE | Facility: CLINIC | Age: 70
End: 2025-09-03
Payer: COMMERCIAL

## 2025-09-03 DIAGNOSIS — R13.10 DYSPHAGIA, UNSPECIFIED TYPE: Primary | ICD-10-CM

## (undated) DEVICE — DEVICE CATH STABILIZATION STATLOCK FOLEY 3-WAY FOL0105

## (undated) DEVICE — SOL NACL 0.9% IRRIG 1000ML BOTTLE 07138-09

## (undated) DEVICE — SU VICRYL 3-0 TIE 12X18" J904T

## (undated) DEVICE — PACK MINOR CUSTOM RIDGES SBA32RMRMA

## (undated) DEVICE — DRAIN PENROSE 0.50"X18" LATEX FREE GR203

## (undated) DEVICE — NDL 22GA 1.5"

## (undated) DEVICE — TUBING IRRIG TUR Y TYPE 96" LF 6543-01

## (undated) DEVICE — Device

## (undated) DEVICE — BAG CLEAR TRASH 1.3M 39X33" P4040C

## (undated) DEVICE — LINEN TOWEL PACK X5 5464

## (undated) DEVICE — SU PDS II 2-0 CT-2 27"  Z333H

## (undated) DEVICE — SU VICRYL 3-0 SH 27" UND J416H

## (undated) DEVICE — PACK TUR CUSTOM SBA15RUFSE

## (undated) DEVICE — DRSG ABDOMINAL 07 1/2X8" 7197D

## (undated) DEVICE — LINEN HALF SHEET 5512

## (undated) DEVICE — ADH SKIN CLOSURE PREMIERPRO EXOFIN MICOR HV 0.5ML 3471

## (undated) DEVICE — SOL NACL 0.9% IRRIG 3000ML BAG 2B7477

## (undated) DEVICE — CLEANSER JET LAVAGE IRRISEPT 0.05% CHG IRRISEPT45USA

## (undated) DEVICE — BAG CYSTO TABLE DRAIN

## (undated) DEVICE — PREP CHLORAPREP 26ML TINTED ORANGE  260815

## (undated) DEVICE — GLOVE PROTEXIS W/NEU-THERA 8.0  2D73TE80

## (undated) DEVICE — GLOVE PROTEXIS W/NEU-THERA 7.5  2D73TE75

## (undated) DEVICE — CATH FOLEY 22FR 5ML LATEX 0165SI22

## (undated) DEVICE — LINEN TOWEL PACK X10 5473

## (undated) DEVICE — GLOVE PROTEXIS BLUE W/NEU-THERA 8.0  2D73EB80

## (undated) DEVICE — SOL WATER IRRIG 1000ML BOTTLE 2F7114

## (undated) DEVICE — TUBING IV SOLUTION SET

## (undated) DEVICE — DRAPE LAP W/ARMBOARD 29410

## (undated) DEVICE — SPONGE KITTNER 30-101

## (undated) DEVICE — SU VICRYL 4-0 PS-2 18" UND J496H

## (undated) DEVICE — SOL NACL 0.9% INJ 1000ML BAG 2B1324X

## (undated) DEVICE — ESU GROUND PAD ADULT W/CORD E7507

## (undated) DEVICE — EVACUATOR BLADDER UROVAC LATEX M0067301250

## (undated) DEVICE — LINEN FULL SHEET 5511

## (undated) DEVICE — GLOVE PROTEXIS POWDER FREE 7.5 ORTHOPEDIC 2D73ET75

## (undated) DEVICE — PAD CHUX UNDERPAD 23X24" 7136

## (undated) DEVICE — SPONGE LAP 4X18" X8415

## (undated) DEVICE — SU PROLENE 2-0 CT-2 30" 8411H

## (undated) RX ORDER — GABAPENTIN 100 MG/1
CAPSULE ORAL
Status: DISPENSED
Start: 2019-12-19

## (undated) RX ORDER — FENTANYL CITRATE 50 UG/ML
INJECTION, SOLUTION INTRAMUSCULAR; INTRAVENOUS
Status: DISPENSED
Start: 2019-12-19

## (undated) RX ORDER — ONDANSETRON 2 MG/ML
INJECTION INTRAMUSCULAR; INTRAVENOUS
Status: DISPENSED
Start: 2019-12-19

## (undated) RX ORDER — REGADENOSON 0.08 MG/ML
INJECTION, SOLUTION INTRAVENOUS
Status: DISPENSED
Start: 2019-10-03

## (undated) RX ORDER — REGADENOSON 0.08 MG/ML
INJECTION, SOLUTION INTRAVENOUS
Status: DISPENSED
Start: 2023-03-23

## (undated) RX ORDER — CEFAZOLIN SODIUM 2 G/100ML
INJECTION, SOLUTION INTRAVENOUS
Status: DISPENSED
Start: 2019-11-21

## (undated) RX ORDER — ONDANSETRON 2 MG/ML
INJECTION INTRAMUSCULAR; INTRAVENOUS
Status: DISPENSED
Start: 2019-11-21

## (undated) RX ORDER — FENTANYL CITRATE 50 UG/ML
INJECTION, SOLUTION INTRAMUSCULAR; INTRAVENOUS
Status: DISPENSED
Start: 2019-11-21

## (undated) RX ORDER — LIDOCAINE HYDROCHLORIDE 20 MG/ML
INJECTION, SOLUTION EPIDURAL; INFILTRATION; INTRACAUDAL; PERINEURAL
Status: DISPENSED
Start: 2019-11-21

## (undated) RX ORDER — ATROPA BELLADONNA AND OPIUM 16.2; 3 MG/1; MG/1
SUPPOSITORY RECTAL
Status: DISPENSED
Start: 2019-11-21

## (undated) RX ORDER — ACETAMINOPHEN 325 MG/1
TABLET ORAL
Status: DISPENSED
Start: 2019-12-19

## (undated) RX ORDER — LIDOCAINE HYDROCHLORIDE 20 MG/ML
INJECTION, SOLUTION EPIDURAL; INFILTRATION; INTRACAUDAL; PERINEURAL
Status: DISPENSED
Start: 2019-12-19

## (undated) RX ORDER — PROPOFOL 10 MG/ML
INJECTION, EMULSION INTRAVENOUS
Status: DISPENSED
Start: 2019-12-19

## (undated) RX ORDER — CEFAZOLIN SODIUM 2 G/100ML
INJECTION, SOLUTION INTRAVENOUS
Status: DISPENSED
Start: 2019-12-19

## (undated) RX ORDER — FENTANYL CITRATE 0.05 MG/ML
INJECTION, SOLUTION INTRAMUSCULAR; INTRAVENOUS
Status: DISPENSED
Start: 2019-11-21

## (undated) RX ORDER — BUPIVACAINE HYDROCHLORIDE 5 MG/ML
INJECTION, SOLUTION EPIDURAL; INTRACAUDAL
Status: DISPENSED
Start: 2019-12-19